# Patient Record
Sex: MALE | Race: ASIAN | NOT HISPANIC OR LATINO | URBAN - METROPOLITAN AREA
[De-identification: names, ages, dates, MRNs, and addresses within clinical notes are randomized per-mention and may not be internally consistent; named-entity substitution may affect disease eponyms.]

---

## 2017-01-18 ENCOUNTER — INPATIENT (INPATIENT)
Facility: HOSPITAL | Age: 37
LOS: 7 days | Discharge: HOME HEALTH | End: 2017-01-26
Attending: OTOLARYNGOLOGY | Admitting: OTOLARYNGOLOGY
Payer: COMMERCIAL

## 2017-01-18 VITALS
TEMPERATURE: 98 F | OXYGEN SATURATION: 100 % | SYSTOLIC BLOOD PRESSURE: 142 MMHG | DIASTOLIC BLOOD PRESSURE: 98 MMHG | HEART RATE: 110 BPM | RESPIRATION RATE: 16 BRPM

## 2017-01-18 LAB
ALBUMIN SERPL ELPH-MCNC: 4.6 G/DL — SIGNIFICANT CHANGE UP (ref 3.3–5)
ALP SERPL-CCNC: 50 U/L — SIGNIFICANT CHANGE UP (ref 40–120)
ALT FLD-CCNC: 19 U/L — SIGNIFICANT CHANGE UP (ref 4–41)
AST SERPL-CCNC: 12 U/L — SIGNIFICANT CHANGE UP (ref 4–40)
BASOPHILS # BLD AUTO: 0.03 K/UL — SIGNIFICANT CHANGE UP (ref 0–0.2)
BASOPHILS NFR BLD AUTO: 0.3 % — SIGNIFICANT CHANGE UP (ref 0–2)
BILIRUB SERPL-MCNC: 1.1 MG/DL — SIGNIFICANT CHANGE UP (ref 0.2–1.2)
BUN SERPL-MCNC: 12 MG/DL — SIGNIFICANT CHANGE UP (ref 7–23)
CALCIUM SERPL-MCNC: 9.9 MG/DL — SIGNIFICANT CHANGE UP (ref 8.4–10.5)
CHLORIDE SERPL-SCNC: 93 MMOL/L — LOW (ref 98–107)
CO2 SERPL-SCNC: 22 MMOL/L — SIGNIFICANT CHANGE UP (ref 22–31)
CREAT SERPL-MCNC: 0.91 MG/DL — SIGNIFICANT CHANGE UP (ref 0.5–1.3)
EOSINOPHIL # BLD AUTO: 0.06 K/UL — SIGNIFICANT CHANGE UP (ref 0–0.5)
EOSINOPHIL NFR BLD AUTO: 0.5 % — SIGNIFICANT CHANGE UP (ref 0–6)
GLUCOSE SERPL-MCNC: 282 MG/DL — HIGH (ref 70–99)
HBA1C BLD-MCNC: 10.9 % — HIGH (ref 4–5.6)
HCT VFR BLD CALC: 44.9 % — SIGNIFICANT CHANGE UP (ref 39–50)
HGB BLD-MCNC: 15.1 G/DL — SIGNIFICANT CHANGE UP (ref 13–17)
IMM GRANULOCYTES NFR BLD AUTO: 0.3 % — SIGNIFICANT CHANGE UP (ref 0–1.5)
LYMPHOCYTES # BLD AUTO: 1.21 K/UL — SIGNIFICANT CHANGE UP (ref 1–3.3)
LYMPHOCYTES # BLD AUTO: 10.1 % — LOW (ref 13–44)
MCHC RBC-ENTMCNC: 25 PG — LOW (ref 27–34)
MCHC RBC-ENTMCNC: 33.6 % — SIGNIFICANT CHANGE UP (ref 32–36)
MCV RBC AUTO: 74.5 FL — LOW (ref 80–100)
MONOCYTES # BLD AUTO: 1.08 K/UL — HIGH (ref 0–0.9)
MONOCYTES NFR BLD AUTO: 9 % — SIGNIFICANT CHANGE UP (ref 2–14)
NEUTROPHILS # BLD AUTO: 9.53 K/UL — HIGH (ref 1.8–7.4)
NEUTROPHILS NFR BLD AUTO: 79.8 % — HIGH (ref 43–77)
PLATELET # BLD AUTO: 206 K/UL — SIGNIFICANT CHANGE UP (ref 150–400)
PMV BLD: 10.3 FL — SIGNIFICANT CHANGE UP (ref 7–13)
POTASSIUM SERPL-MCNC: 4.2 MMOL/L — SIGNIFICANT CHANGE UP (ref 3.5–5.3)
POTASSIUM SERPL-SCNC: 4.2 MMOL/L — SIGNIFICANT CHANGE UP (ref 3.5–5.3)
PROT SERPL-MCNC: 8.5 G/DL — HIGH (ref 6–8.3)
RBC # BLD: 6.03 M/UL — HIGH (ref 4.2–5.8)
RBC # FLD: 13.3 % — SIGNIFICANT CHANGE UP (ref 10.3–14.5)
SODIUM SERPL-SCNC: 135 MMOL/L — SIGNIFICANT CHANGE UP (ref 135–145)
WBC # BLD: 11.95 K/UL — HIGH (ref 3.8–10.5)
WBC # FLD AUTO: 11.95 K/UL — HIGH (ref 3.8–10.5)

## 2017-01-18 PROCEDURE — 70491 CT SOFT TISSUE NECK W/DYE: CPT | Mod: 26

## 2017-01-18 RX ORDER — INSULIN LISPRO 100/ML
VIAL (ML) SUBCUTANEOUS AT BEDTIME
Qty: 0 | Refills: 0 | Status: DISCONTINUED | OUTPATIENT
Start: 2017-01-18 | End: 2017-01-26

## 2017-01-18 RX ORDER — DEXTROSE 50 % IN WATER 50 %
25 SYRINGE (ML) INTRAVENOUS ONCE
Qty: 0 | Refills: 0 | Status: DISCONTINUED | OUTPATIENT
Start: 2017-01-18 | End: 2017-01-26

## 2017-01-18 RX ORDER — DEXTROSE 50 % IN WATER 50 %
12.5 SYRINGE (ML) INTRAVENOUS ONCE
Qty: 0 | Refills: 0 | Status: DISCONTINUED | OUTPATIENT
Start: 2017-01-18 | End: 2017-01-26

## 2017-01-18 RX ORDER — SODIUM CHLORIDE 9 MG/ML
1000 INJECTION INTRAMUSCULAR; INTRAVENOUS; SUBCUTANEOUS ONCE
Qty: 0 | Refills: 0 | Status: COMPLETED | OUTPATIENT
Start: 2017-01-18 | End: 2017-01-18

## 2017-01-18 RX ORDER — KETOROLAC TROMETHAMINE 30 MG/ML
30 SYRINGE (ML) INJECTION EVERY 6 HOURS
Qty: 0 | Refills: 0 | Status: DISCONTINUED | OUTPATIENT
Start: 2017-01-18 | End: 2017-01-19

## 2017-01-18 RX ORDER — KETOROLAC TROMETHAMINE 30 MG/ML
30 SYRINGE (ML) INJECTION ONCE
Qty: 0 | Refills: 0 | Status: DISCONTINUED | OUTPATIENT
Start: 2017-01-18 | End: 2017-01-18

## 2017-01-18 RX ORDER — INSULIN GLARGINE 100 [IU]/ML
22 INJECTION, SOLUTION SUBCUTANEOUS AT BEDTIME
Qty: 0 | Refills: 0 | Status: DISCONTINUED | OUTPATIENT
Start: 2017-01-18 | End: 2017-01-19

## 2017-01-18 RX ORDER — SODIUM CHLORIDE 9 MG/ML
1000 INJECTION, SOLUTION INTRAVENOUS
Qty: 0 | Refills: 0 | Status: DISCONTINUED | OUTPATIENT
Start: 2017-01-18 | End: 2017-01-26

## 2017-01-18 RX ORDER — GLUCAGON INJECTION, SOLUTION 0.5 MG/.1ML
1 INJECTION, SOLUTION SUBCUTANEOUS ONCE
Qty: 0 | Refills: 0 | Status: DISCONTINUED | OUTPATIENT
Start: 2017-01-18 | End: 2017-01-26

## 2017-01-18 RX ORDER — DEXTROSE 50 % IN WATER 50 %
1 SYRINGE (ML) INTRAVENOUS ONCE
Qty: 0 | Refills: 0 | Status: DISCONTINUED | OUTPATIENT
Start: 2017-01-18 | End: 2017-01-26

## 2017-01-18 RX ORDER — INSULIN LISPRO 100/ML
VIAL (ML) SUBCUTANEOUS
Qty: 0 | Refills: 0 | Status: DISCONTINUED | OUTPATIENT
Start: 2017-01-18 | End: 2017-01-26

## 2017-01-18 RX ORDER — INSULIN LISPRO 100/ML
8 VIAL (ML) SUBCUTANEOUS
Qty: 0 | Refills: 0 | Status: DISCONTINUED | OUTPATIENT
Start: 2017-01-18 | End: 2017-01-19

## 2017-01-18 RX ADMIN — INSULIN GLARGINE 22 UNIT(S): 100 INJECTION, SOLUTION SUBCUTANEOUS at 23:20

## 2017-01-18 RX ADMIN — Medication 30 MILLIGRAM(S): at 18:59

## 2017-01-18 RX ADMIN — Medication 100 MILLIGRAM(S): at 18:02

## 2017-01-18 RX ADMIN — Medication 2: at 18:01

## 2017-01-18 RX ADMIN — Medication 30 MILLIGRAM(S): at 10:51

## 2017-01-18 RX ADMIN — Medication 30 MILLIGRAM(S): at 18:02

## 2017-01-18 RX ADMIN — Medication 30 MILLIGRAM(S): at 23:27

## 2017-01-18 RX ADMIN — Medication 30 MILLIGRAM(S): at 23:12

## 2017-01-18 RX ADMIN — Medication 100 MILLIGRAM(S): at 10:51

## 2017-01-18 RX ADMIN — SODIUM CHLORIDE 1000 MILLILITER(S): 9 INJECTION INTRAMUSCULAR; INTRAVENOUS; SUBCUTANEOUS at 10:51

## 2017-01-18 RX ADMIN — Medication 8 UNIT(S): at 18:02

## 2017-01-18 NOTE — ED PROVIDER NOTE - PROGRESS NOTE DETAILS
ED Attending Dr. Sauceda: pt feeling well, in NAD; cellulitis but no collection on CT; plan to place in CDU for IV abx and re-eval

## 2017-01-18 NOTE — ED ADULT TRIAGE NOTE - CHIEF COMPLAINT QUOTE
sent by PMD for (+) fever, right facial swelling with redness since Saturday. Initially started as a pimple and worsened. PMH: DM

## 2017-01-18 NOTE — ED PROVIDER NOTE - OBJECTIVE STATEMENT
35 yo male with T2DM on metformin, in ED with 4 days of swelling to right side of face with extension into right neck, associated with chills for 2 days.  No CP/SOB, N/V/D or abdominal pain.  Sent to ED for further evaluation. 37 yo male with T2DM on metformin, in ED with 4 days of swelling to right side of face with extension into right neck, associated with chills for 2 days.  Began as a comedone to face.  No CP/SOB, N/V/D or abdominal pain.  Sent to ED for further evaluation.

## 2017-01-18 NOTE — ED PROVIDER NOTE - MEDICAL DECISION MAKING DETAILS
facial swelling and chills in pt with diabetes; concern for facial infection extending possibly into neck; PLAN--pain control, labs, CT, IV abx, IV fluids, re-eval; likely CDU vs. admission for abx and monitoring

## 2017-01-18 NOTE — ED CDU PROVIDER NOTE - ATTENDING CONTRIBUTION TO CARE
CDU Attending Dr. Sauceda: 35 yo male with DM in ED with facial swelling x 4 days with 2 days of chills.  Found to have facial cellulitis and no abscess on CT.  Placed in CDU for IV abx and further monitoring.  On exam pt well appearing, in NAD, heart RRR, lungs CTAB, abd NTND, extremities without swelling, strength 5/5 in all extremities and skin without rash.  On face there is moderate swelling and induration with small amount of associated fluctuance to right cheek medially, extending to lateral aspect of lips and inferior toward submandibular area; mouth with swelling to right cheek structures making full mouth opening difficult; no obvious other oral pathology noted.

## 2017-01-18 NOTE — ED CDU PROVIDER NOTE - OBJECTIVE STATEMENT
37 yo male with T2DM on metformin, in ED with 4 days of swelling to right side of face with extension into right neck, associated with chills for 2 days.  Began as a comedone to face.  No CP/SOB, N/V/D or abdominal pain.  Sent to ED for further evaluation.    CDU Attending Dr. Sauceda: I agree with the above HPI.  The patient is a 37 yo diabetic male with right facial cellulitis x 4 days with 2 days of chills.  CT showing no abscess.  Sent to CDU for IV abx and re-evaluation and monitoring.

## 2017-01-18 NOTE — ED PROVIDER NOTE - PHYSICAL EXAMINATION
face: moderate swelling and induration with small amount of associated fluctuance to right cheek medially, extending to lateral aspect of lips and inferior toward submandibular area; mouth with swelling to right cheek structures making full mouth opening difficult; no obvious other oral pathology noted

## 2017-01-18 NOTE — ED ADULT NURSE NOTE - OBJECTIVE STATEMENT
Pt AOx3 c/o increasing right facial painful swelling the past few days along with chills. Right jaw swollen and red, tender to touch. Pt denies dysphagia or SOB.

## 2017-01-18 NOTE — ED CDU PROVIDER NOTE - PROGRESS NOTE DETAILS
CDU MATHIEU Ritchie Addendum------  This patient was signed out to me by CDU MATHIEU Barron and CDU attending Dr. Sauceda on 01/18/17 at 1900 hrs; test results reviewed.  In interim, pt has been resting comfortably; no complaints in interim.  Pt stable at present; will continue to monitor / reassess. CDU PA Ritchie Addendum----  Pt. resting comfortably in interim; no issues to date apart from intermittent pain (controlled with Toradol, being given every 6 hours) and percocet prn.  No objective change in facial swelling / erythema; am labs ordered; will continue to monitor / reassess.  Pt. will be signed out to CDU day PA and attending at 0700 hrs. CDU MATHIEU Bingham  Pt evaluated by ENT PA. No drainable abscess at this time. Discussed with Dr. De Los Santos who would like patient admitted to his service for monitoring and IV abx. Dr. De Los Santos requested antibiotics be changed to vancomycin. Erica progress and admit note:  Pt with significant right facial swelling, possible fluctuence, not improving- will call ENT, continue antibiotics, admit.  Lungs clear and OP patent.  Otherwise in nad. Erica progress and admit note and shared PA assessment:  I performed a face to face evaluation of this patient and obtained a history and performed a full exam.  I agree with the history, physical exam and plan of the PA.    Pt with significant right facial swelling, possible fluctuence, not improving- will call ENT, continue antibiotics, admit.  Lungs clear and OP patent.  Otherwise in nad.

## 2017-01-19 DIAGNOSIS — L03.211 CELLULITIS OF FACE: ICD-10-CM

## 2017-01-19 LAB
BASE EXCESS BLDV CALC-SCNC: 1 MMOL/L — SIGNIFICANT CHANGE UP
BASOPHILS # BLD AUTO: 0.02 K/UL — SIGNIFICANT CHANGE UP (ref 0–0.2)
BASOPHILS NFR BLD AUTO: 0.2 % — SIGNIFICANT CHANGE UP (ref 0–2)
BLOOD GAS VENOUS - CREATININE: 0.73 MG/DL — SIGNIFICANT CHANGE UP (ref 0.5–1.3)
BUN SERPL-MCNC: 14 MG/DL — SIGNIFICANT CHANGE UP (ref 7–23)
CALCIUM SERPL-MCNC: 9.4 MG/DL — SIGNIFICANT CHANGE UP (ref 8.4–10.5)
CHLORIDE BLDV-SCNC: 101 MMOL/L — SIGNIFICANT CHANGE UP (ref 96–108)
CHLORIDE SERPL-SCNC: 95 MMOL/L — LOW (ref 98–107)
CO2 SERPL-SCNC: 24 MMOL/L — SIGNIFICANT CHANGE UP (ref 22–31)
CREAT SERPL-MCNC: 0.79 MG/DL — SIGNIFICANT CHANGE UP (ref 0.5–1.3)
EOSINOPHIL # BLD AUTO: 0.15 K/UL — SIGNIFICANT CHANGE UP (ref 0–0.5)
EOSINOPHIL NFR BLD AUTO: 1.7 % — SIGNIFICANT CHANGE UP (ref 0–6)
GAS PNL BLDV: 131 MMOL/L — LOW (ref 136–146)
GLUCOSE BLDV-MCNC: 221 — HIGH (ref 70–99)
GLUCOSE SERPL-MCNC: 223 MG/DL — HIGH (ref 70–99)
GRAM STN SPEC: SIGNIFICANT CHANGE UP
HCO3 BLDV-SCNC: 24 MMOL/L — SIGNIFICANT CHANGE UP (ref 20–27)
HCT VFR BLD CALC: 41.6 % — SIGNIFICANT CHANGE UP (ref 39–50)
HCT VFR BLDV CALC: 44.6 % — SIGNIFICANT CHANGE UP (ref 39–51)
HGB BLD-MCNC: 13.9 G/DL — SIGNIFICANT CHANGE UP (ref 13–17)
HGB BLDV-MCNC: 14.6 G/DL — SIGNIFICANT CHANGE UP (ref 13–17)
IMM GRANULOCYTES NFR BLD AUTO: 0.2 % — SIGNIFICANT CHANGE UP (ref 0–1.5)
LACTATE BLDV-MCNC: 1.6 MMOL/L — SIGNIFICANT CHANGE UP (ref 0.5–2)
LYMPHOCYTES # BLD AUTO: 0.81 K/UL — LOW (ref 1–3.3)
LYMPHOCYTES # BLD AUTO: 9.3 % — LOW (ref 13–44)
MCHC RBC-ENTMCNC: 24.7 PG — LOW (ref 27–34)
MCHC RBC-ENTMCNC: 33.4 % — SIGNIFICANT CHANGE UP (ref 32–36)
MCV RBC AUTO: 74 FL — LOW (ref 80–100)
MONOCYTES # BLD AUTO: 0.68 K/UL — SIGNIFICANT CHANGE UP (ref 0–0.9)
MONOCYTES NFR BLD AUTO: 7.8 % — SIGNIFICANT CHANGE UP (ref 2–14)
NEUTROPHILS # BLD AUTO: 6.99 K/UL — SIGNIFICANT CHANGE UP (ref 1.8–7.4)
NEUTROPHILS NFR BLD AUTO: 80.8 % — HIGH (ref 43–77)
PCO2 BLDV: 49 MMHG — SIGNIFICANT CHANGE UP (ref 41–51)
PH BLDV: 7.35 PH — SIGNIFICANT CHANGE UP (ref 7.32–7.43)
PLATELET # BLD AUTO: 182 K/UL — SIGNIFICANT CHANGE UP (ref 150–400)
PMV BLD: 9.9 FL — SIGNIFICANT CHANGE UP (ref 7–13)
PO2 BLDV: 33 MMHG — LOW (ref 35–40)
POTASSIUM BLDV-SCNC: 3.7 MMOL/L — SIGNIFICANT CHANGE UP (ref 3.4–4.5)
POTASSIUM SERPL-MCNC: 4 MMOL/L — SIGNIFICANT CHANGE UP (ref 3.5–5.3)
POTASSIUM SERPL-SCNC: 4 MMOL/L — SIGNIFICANT CHANGE UP (ref 3.5–5.3)
RBC # BLD: 5.62 M/UL — SIGNIFICANT CHANGE UP (ref 4.2–5.8)
RBC # FLD: 13.2 % — SIGNIFICANT CHANGE UP (ref 10.3–14.5)
SAO2 % BLDV: 58.8 % — LOW (ref 60–85)
SODIUM SERPL-SCNC: 133 MMOL/L — LOW (ref 135–145)
SPECIMEN SOURCE: SIGNIFICANT CHANGE UP
WBC # BLD: 8.67 K/UL — SIGNIFICANT CHANGE UP (ref 3.8–10.5)
WBC # FLD AUTO: 8.67 K/UL — SIGNIFICANT CHANGE UP (ref 3.8–10.5)

## 2017-01-19 PROCEDURE — 99255 IP/OBS CONSLTJ NEW/EST HI 80: CPT | Mod: GC

## 2017-01-19 RX ORDER — INSULIN GLARGINE 100 [IU]/ML
25 INJECTION, SOLUTION SUBCUTANEOUS AT BEDTIME
Qty: 0 | Refills: 0 | Status: DISCONTINUED | OUTPATIENT
Start: 2017-01-19 | End: 2017-01-26

## 2017-01-19 RX ORDER — HYDROMORPHONE HYDROCHLORIDE 2 MG/ML
2 INJECTION INTRAMUSCULAR; INTRAVENOUS; SUBCUTANEOUS ONCE
Qty: 0 | Refills: 0 | Status: DISCONTINUED | OUTPATIENT
Start: 2017-01-19 | End: 2017-01-20

## 2017-01-19 RX ORDER — INSULIN LISPRO 100/ML
12 VIAL (ML) SUBCUTANEOUS
Qty: 0 | Refills: 0 | Status: DISCONTINUED | OUTPATIENT
Start: 2017-01-19 | End: 2017-01-19

## 2017-01-19 RX ORDER — INSULIN LISPRO 100/ML
10 VIAL (ML) SUBCUTANEOUS
Qty: 0 | Refills: 0 | Status: DISCONTINUED | OUTPATIENT
Start: 2017-01-19 | End: 2017-01-25

## 2017-01-19 RX ORDER — MORPHINE SULFATE 50 MG/1
4 CAPSULE, EXTENDED RELEASE ORAL EVERY 4 HOURS
Qty: 0 | Refills: 0 | Status: DISCONTINUED | OUTPATIENT
Start: 2017-01-19 | End: 2017-01-20

## 2017-01-19 RX ORDER — ACETAMINOPHEN 500 MG
650 TABLET ORAL EVERY 6 HOURS
Qty: 0 | Refills: 0 | Status: DISCONTINUED | OUTPATIENT
Start: 2017-01-19 | End: 2017-01-26

## 2017-01-19 RX ADMIN — INSULIN GLARGINE 25 UNIT(S): 100 INJECTION, SOLUTION SUBCUTANEOUS at 22:30

## 2017-01-19 RX ADMIN — Medication 10 UNIT(S): at 19:34

## 2017-01-19 RX ADMIN — Medication 100 MILLIGRAM(S): at 01:35

## 2017-01-19 RX ADMIN — Medication 100 MILLIGRAM(S): at 10:36

## 2017-01-19 RX ADMIN — Medication 8 UNIT(S): at 09:13

## 2017-01-19 RX ADMIN — Medication 2: at 19:33

## 2017-01-19 RX ADMIN — MORPHINE SULFATE 4 MILLIGRAM(S): 50 CAPSULE, EXTENDED RELEASE ORAL at 22:35

## 2017-01-19 RX ADMIN — Medication 30 MILLIGRAM(S): at 13:10

## 2017-01-19 RX ADMIN — Medication: at 13:16

## 2017-01-19 RX ADMIN — MORPHINE SULFATE 4 MILLIGRAM(S): 50 CAPSULE, EXTENDED RELEASE ORAL at 22:20

## 2017-01-19 RX ADMIN — Medication 100 MILLIGRAM(S): at 19:34

## 2017-01-19 RX ADMIN — Medication 30 MILLIGRAM(S): at 06:42

## 2017-01-19 RX ADMIN — Medication 8 UNIT(S): at 13:10

## 2017-01-19 RX ADMIN — Medication 2: at 09:13

## 2017-01-19 RX ADMIN — Medication 30 MILLIGRAM(S): at 06:57

## 2017-01-19 NOTE — H&P ADULT. - ENMT COMMENTS
right sided lip and facial swelling, no fluctuance right sided lip and facial and oral cavity swelling, +fluctuance; nasal/sinus endocscopy: right maxillary sinus with seromucous fluid via inferior meatus, left max sinus clear

## 2017-01-19 NOTE — H&P ADULT. - PROBLEM SELECTOR PLAN 1
IV antibiotics Incision & Drainage of right cheek/oral cavity abscess  IV Vancomycin  Airway observation  Endocrine consult for blood sugar control

## 2017-01-19 NOTE — H&P ADULT. - ENT GEN HX ROS MEA POS PC
sore throat/throat pain/nasal congestion/nasal obstruction/dry mouth/dysphagia/post-nasal discharge/gum bleeding/nasal discharge

## 2017-01-19 NOTE — H&P ADULT. - HISTORY OF PRESENT ILLNESS
36 year old male with DM presents to the ED stating he has been having Right facial swelling and pain. Treated recently with antibiotics and steroids and the swelling improved. States swelling got worse yesterday. 36 year old male with a past medical history significant for DM presents to the emergency room at [facility] with a chief complaint of right face and oral cavity pain and swelling for the past several hours. The patient saw an outside MD who prescribed antibiotics but it did not improve. He has difficulty swallowing and changes in his voice. He has sinus pain and a postnasal drip. His pain and swelling is getting much worse.

## 2017-01-20 PROCEDURE — 93010 ELECTROCARDIOGRAM REPORT: CPT

## 2017-01-20 PROCEDURE — 99253 IP/OBS CNSLTJ NEW/EST LOW 45: CPT

## 2017-01-20 PROCEDURE — 99232 SBSQ HOSP IP/OBS MODERATE 35: CPT

## 2017-01-20 RX ORDER — METFORMIN HYDROCHLORIDE 850 MG/1
1000 TABLET ORAL
Qty: 0 | Refills: 0 | Status: DISCONTINUED | OUTPATIENT
Start: 2017-01-20 | End: 2017-01-26

## 2017-01-20 RX ORDER — BACITRACIN ZINC 500 UNIT/G
1 OINTMENT IN PACKET (EA) TOPICAL
Qty: 0 | Refills: 0 | Status: DISCONTINUED | OUTPATIENT
Start: 2017-01-20 | End: 2017-01-20

## 2017-01-20 RX ORDER — MUPIROCIN 20 MG/G
1 OINTMENT TOPICAL
Qty: 0 | Refills: 0 | Status: DISCONTINUED | OUTPATIENT
Start: 2017-01-20 | End: 2017-01-26

## 2017-01-20 RX ORDER — AMPICILLIN SODIUM AND SULBACTAM SODIUM 250; 125 MG/ML; MG/ML
3 INJECTION, POWDER, FOR SUSPENSION INTRAMUSCULAR; INTRAVENOUS EVERY 6 HOURS
Qty: 0 | Refills: 0 | Status: DISCONTINUED | OUTPATIENT
Start: 2017-01-20 | End: 2017-01-25

## 2017-01-20 RX ADMIN — Medication 10 UNIT(S): at 17:49

## 2017-01-20 RX ADMIN — Medication 1: at 17:49

## 2017-01-20 RX ADMIN — HYDROMORPHONE HYDROCHLORIDE 2 MILLIGRAM(S): 2 INJECTION INTRAMUSCULAR; INTRAVENOUS; SUBCUTANEOUS at 01:08

## 2017-01-20 RX ADMIN — Medication 100 MILLIGRAM(S): at 02:16

## 2017-01-20 RX ADMIN — INSULIN GLARGINE 25 UNIT(S): 100 INJECTION, SOLUTION SUBCUTANEOUS at 22:47

## 2017-01-20 RX ADMIN — Medication 10 UNIT(S): at 12:31

## 2017-01-20 RX ADMIN — AMPICILLIN SODIUM AND SULBACTAM SODIUM 200 GRAM(S): 250; 125 INJECTION, POWDER, FOR SUSPENSION INTRAMUSCULAR; INTRAVENOUS at 22:45

## 2017-01-20 RX ADMIN — METFORMIN HYDROCHLORIDE 1000 MILLIGRAM(S): 850 TABLET ORAL at 17:49

## 2017-01-20 RX ADMIN — Medication 10 UNIT(S): at 08:46

## 2017-01-20 RX ADMIN — Medication 1: at 12:31

## 2017-01-20 RX ADMIN — HYDROMORPHONE HYDROCHLORIDE 2 MILLIGRAM(S): 2 INJECTION INTRAMUSCULAR; INTRAVENOUS; SUBCUTANEOUS at 00:38

## 2017-01-20 RX ADMIN — Medication 100 MILLIGRAM(S): at 17:49

## 2017-01-20 RX ADMIN — Medication 100 MILLIGRAM(S): at 10:17

## 2017-01-20 RX ADMIN — Medication 1: at 08:46

## 2017-01-20 NOTE — DISCHARGE NOTE ADULT - HOSPITAL COURSE
36 year old Male with a history of DM that presented to the hospital with Right facial swelling and cellulitis, Small abscess drained at bedside with I and D. Started on IV clindamycin and wound was packed with 1/4 gauze... 36 year old Male that presented to the hospital with Right facial swelling and cellulitis, Small abscess drained at bedside with I and D. Started on IV clindamycin and wound was packed with 1/4 gauze. Patient was than switched to Unasyn and Evaluated by ID. Also Evaluated by Endocrinology after new diagnosis of DM. Started on glucophage and Subq insulin. Discharged home on augmentin on 1/26/17. Also Will have outpt Packing once daily with [facility].

## 2017-01-20 NOTE — DISCHARGE NOTE ADULT - INSTRUCTIONS
Keep incision site intact dry and clean, monitor fs as md orders, f/u md visit and instructions, any fever ,chills, nausea vomiting call md,

## 2017-01-20 NOTE — DISCHARGE NOTE ADULT - ADDITIONAL INSTRUCTIONS
Follow up with Jazzmine James NP at [facility] Endocrinology office at [address] [place] [address], 2/3/17 at 245pm.  [phone] Follow up with Jazzmine James NP at [facility] Endocrinology office at [address] [place] [address], 2/3/17 at 245pm.  [phone]  Packing changes to Right cheek w 1/4 gauze. Cover with dry gauze and tape in place. Change daily.

## 2017-01-20 NOTE — DISCHARGE NOTE ADULT - CARE PROVIDER_API CALL
Soren De Los Santos), Otolaryngology  [address] [place] [address]  Phone: [phone]  Fax: [phone] Soren De Los Santos), Otolaryngology  [address] [place] [address]  Phone: [phone]  Fax: [phone]    Berlin Galan), EndocrinologyMetabDiabetes; Internal Medicine  [address] [place] [address]  Phone: [phone]  Fax: [phone]

## 2017-01-20 NOTE — DISCHARGE NOTE ADULT - PATIENT PORTAL LINK FT
“You can access the Follow[employer] Patient Portal, offered by [facility], by registering with the following website: [email or web address]”

## 2017-01-20 NOTE — DISCHARGE NOTE ADULT - CARE PROVIDERS DIRECT ADDRESSES
,DirectAddress_Unknown,DirectAddress_Unknown ,DirectAddress_Unknown,[email or web address],DirectAddress_Unknown

## 2017-01-20 NOTE — DISCHARGE NOTE ADULT - PLAN OF CARE
IV antibiotics, I and D of facial abscess regular ADA diet target fingerstick  Please check you fingersticks every morning, or if you are not feeling well and before meals.  If your fingerstick is >300 x 3 or more readings Please contact Dr. Galan or Jazzmine James NP the diabetes team who cared for you in the hospital.  If your fingerstick is <70 and/or you have symptoms of very low blood sugar, FIRST drink 1/2 cup of apple juice (or take 4 glucose tabs) and recheck fingerstick in 15min. Repeat these steps until blood sugar is above 100, if necessary.  THEN call Dr. Galan or Jazzmine James NP to discuss low sugar at [phone]

## 2017-01-20 NOTE — DISCHARGE NOTE ADULT - MEDICATION SUMMARY - MEDICATIONS TO TAKE
I will START or STAY ON the medications listed below when I get home from the hospital:    glucometer  -- 1 application intradermal 3 times a day (before meals)  -- Indication: For DM    acetaminophen-oxyCODONE 325 mg-5 mg oral tablet  -- 2 tab(s) by mouth every 6 hours, As Needed, Moderate Pain (4 - 6) MDD:8  -- Indication: For pain    Lantus Solostar Pen 100 units/mL subcutaneous solution  -- 25 unit(s) subcutaneous prn  -- Do not drink alcoholic beverages when taking this medication.  It is very important that you take or use this exactly as directed.  Do not skip doses or discontinue unless directed by your doctor.  Keep in refrigerator.  Do not freeze.    -- Indication: For DM    metFORMIN 1000 mg oral tablet  -- 1 tab(s) by mouth 2 times a day (with meals)  -- Indication: For DM I will START or STAY ON the medications listed below when I get home from the hospital:    glucometer  -- 1 application intradermal 3 times a day (before meals)  -- Indication: For DM    test strips  -- 1 unit(s) subcutaneous 4 times a day  -- Indication: For DM    Lancets  -- 1 unit(s) subcutaneous 4 times a day  -- Indication: For DM    acetaminophen-oxyCODONE 325 mg-5 mg oral tablet  -- 2 tab(s) by mouth every 6 hours, As Needed, Moderate Pain (4 - 6) MDD:8  -- Indication: For pain    Lantus Solostar Pen 100 units/mL subcutaneous solution  -- 25 unit(s) subcutaneous prn  -- Do not drink alcoholic beverages when taking this medication.  It is very important that you take or use this exactly as directed.  Do not skip doses or discontinue unless directed by your doctor.  Keep in refrigerator.  Do not freeze.    -- Indication: For DM    metFORMIN 1000 mg oral tablet  -- 1 tab(s) by mouth 2 times a day (with meals)  -- Indication: For DM    insulin lispro 100 units/mL subcutaneous solution  -- 8 unit(s) subcutaneous 3 times a day (before meals)  -- Indication: For DM    amoxicillin-clavulanate 875 mg-125 mg oral tablet  -- 1 tab(s) by mouth 2 times a day  -- Indication: For abscess I will START or STAY ON the medications listed below when I get home from the hospital:    glucometer  -- 1 application intradermal 3 times a day (before meals)  -- Indication: For DM    test strips  -- 1 unit(s) subcutaneous 4 times a day  -- Indication: For DM    Lancets  -- 1 unit(s) subcutaneous 4 times a day  -- Indication: For DM    acetaminophen-oxyCODONE 325 mg-5 mg oral tablet  -- 2 tab(s) by mouth every 6 hours, As Needed, Moderate Pain (4 - 6) MDD:8  -- Indication: For pain    Lantus Solostar Pen 100 units/mL subcutaneous solution  -- 25 unit(s) subcutaneous prn  -- Do not drink alcoholic beverages when taking this medication.  It is very important that you take or use this exactly as directed.  Do not skip doses or discontinue unless directed by your doctor.  Keep in refrigerator.  Do not freeze.    -- Indication: For DM    metFORMIN 1000 mg oral tablet  -- 1 tab(s) by mouth 2 times a day (with meals)  -- Indication: For DM    insulin lispro 100 units/mL subcutaneous solution  -- 8 unit(s) subcutaneous 3 times a day (before meals). Please dipense the Pens. Our online prescritiion writer doesn't have them to choose   -- Indication: For DM    amoxicillin-clavulanate 875 mg-125 mg oral tablet  -- 1 tab(s) by mouth 2 times a day  -- Indication: For abscess

## 2017-01-20 NOTE — DISCHARGE NOTE ADULT - CARE PLAN
Principal Discharge DX:	Facial cellulitis  Goal:	IV antibiotics, I and D of facial abscess  Instructions for follow-up, activity and diet:	regular ADA diet Principal Discharge DX:	Facial cellulitis  Goal:	IV antibiotics, I and D of facial abscess  Instructions for follow-up, activity and diet:	regular ADA diet  Secondary Diagnosis:	Type 2 diabetes mellitus with complication, without long-term current use of insulin  Goal:	target fingerstick   Instructions for follow-up, activity and diet:	Please check you fingersticks every morning, or if you are not feeling well and before meals.  If your fingerstick is >300 x 3 or more readings Please contact Dr. Galan or Jazzmine James NP the diabetes team who cared for you in the hospital.  If your fingerstick is <70 and/or you have symptoms of very low blood sugar, FIRST drink 1/2 cup of apple juice (or take 4 glucose tabs) and recheck fingerstick in 15min. Repeat these steps until blood sugar is above 100, if necessary.  THEN call Dr. Galan or Jazzmine James NP to discuss low sugar at [phone] Principal Discharge DX:	Facial cellulitis  Goal:	IV antibiotics, I and D of facial abscess  Instructions for follow-up, activity and diet:	regular ADA diet  Secondary Diagnosis:	Type 2 diabetes mellitus with complication, without long-term current use of insulin  Goal:	target fingerstick   Instructions for follow-up, activity and diet:	Please check you fingersticks every morning, or if you are not feeling well and before meals.  If your fingerstick is >300 x 3 or more readings Please contact Dr. Galan or Jazzmine James NP the diabetes team who cared for you in the hospital.  If your fingerstick is <70 and/or you have symptoms of very low blood sugar, FIRST drink 1/2 cup of apple juice (or take 4 glucose tabs) and recheck fingerstick in 15min. Repeat these steps until blood sugar is above 100, if necessary.  THEN call Dr. Galan or Jazzmine James NP to discuss low sugar at [phone] Principal Discharge DX:	Facial cellulitis  Goal:	IV antibiotics, I and D of facial abscess  Instructions for follow-up, activity and diet:	regular ADA diet  Secondary Diagnosis:	Type 2 diabetes mellitus with complication, without long-term current use of insulin  Goal:	target fingerstick   Instructions for follow-up, activity and diet:	Please check you fingersticks every morning, or if you are not feeling well and before meals.  If your fingerstick is >300 x 3 or more readings Please contact Dr. Galan or Jazzmine James NP the diabetes team who cared for you in the hospital.  If your fingerstick is <70 and/or you have symptoms of very low blood sugar, FIRST drink 1/2 cup of apple juice (or take 4 glucose tabs) and recheck fingerstick in 15min. Repeat these steps until blood sugar is above 100, if necessary.  THEN call Dr. Galan or Jazzmine James NP to discuss low sugar at [phone] Principal Discharge DX:	Facial cellulitis  Goal:	IV antibiotics, I and D of facial abscess  Instructions for follow-up, activity and diet:	regular ADA diet  Secondary Diagnosis:	Type 2 diabetes mellitus with complication, without long-term current use of insulin  Goal:	target fingerstick   Instructions for follow-up, activity and diet:	Please check you fingersticks every morning, or if you are not feeling well and before meals.  If your fingerstick is >300 x 3 or more readings Please contact Dr. Galan or Jazzmine James NP the diabetes team who cared for you in the hospital.  If your fingerstick is <70 and/or you have symptoms of very low blood sugar, FIRST drink 1/2 cup of apple juice (or take 4 glucose tabs) and recheck fingerstick in 15min. Repeat these steps until blood sugar is above 100, if necessary.  THEN call Dr. Galan or Jazzmine James NP to discuss low sugar at [phone] Principal Discharge DX:	Facial cellulitis  Goal:	IV antibiotics, I and D of facial abscess  Instructions for follow-up, activity and diet:	regular ADA diet  Secondary Diagnosis:	Type 2 diabetes mellitus with complication, without long-term current use of insulin  Goal:	target fingerstick   Instructions for follow-up, activity and diet:	Please check you fingersticks every morning, or if you are not feeling well and before meals.  If your fingerstick is >300 x 3 or more readings Please contact Dr. Galan or Jazzmine James NP the diabetes team who cared for you in the hospital.  If your fingerstick is <70 and/or you have symptoms of very low blood sugar, FIRST drink 1/2 cup of apple juice (or take 4 glucose tabs) and recheck fingerstick in 15min. Repeat these steps until blood sugar is above 100, if necessary.  THEN call Dr. Galan or Jazzmine James NP to discuss low sugar at [phone] Principal Discharge DX:	Facial cellulitis  Goal:	IV antibiotics, I and D of facial abscess  Instructions for follow-up, activity and diet:	regular ADA diet  Secondary Diagnosis:	Type 2 diabetes mellitus with complication, without long-term current use of insulin  Goal:	target fingerstick   Instructions for follow-up, activity and diet:	Please check you fingersticks every morning, or if you are not feeling well and before meals.  If your fingerstick is >300 x 3 or more readings Please contact Dr. Galan or Jazzmine James NP the diabetes team who cared for you in the hospital.  If your fingerstick is <70 and/or you have symptoms of very low blood sugar, FIRST drink 1/2 cup of apple juice (or take 4 glucose tabs) and recheck fingerstick in 15min. Repeat these steps until blood sugar is above 100, if necessary.  THEN call Dr. Galan or Jazzmine James NP to discuss low sugar at [phone] Principal Discharge DX:	Facial cellulitis  Goal:	IV antibiotics, I and D of facial abscess  Instructions for follow-up, activity and diet:	regular ADA diet  Secondary Diagnosis:	Type 2 diabetes mellitus with complication, without long-term current use of insulin  Goal:	target fingerstick   Instructions for follow-up, activity and diet:	Please check you fingersticks every morning, or if you are not feeling well and before meals.  If your fingerstick is >300 x 3 or more readings Please contact Dr. Galan or Jazzmine James NP the diabetes team who cared for you in the hospital.  If your fingerstick is <70 and/or you have symptoms of very low blood sugar, FIRST drink 1/2 cup of apple juice (or take 4 glucose tabs) and recheck fingerstick in 15min. Repeat these steps until blood sugar is above 100, if necessary.  THEN call Dr. Galan or Jazzmine James NP to discuss low sugar at [phone]

## 2017-01-21 LAB
-  AMIKACIN: SIGNIFICANT CHANGE UP
-  AMPICILLIN/SULBACTAM: SIGNIFICANT CHANGE UP
-  AMPICILLIN: SIGNIFICANT CHANGE UP
-  AZTREONAM: SIGNIFICANT CHANGE UP
-  CEFAZOLIN: SIGNIFICANT CHANGE UP
-  CEFEPIME: SIGNIFICANT CHANGE UP
-  CEFOXITIN: SIGNIFICANT CHANGE UP
-  CEFTAZIDIME: SIGNIFICANT CHANGE UP
-  CEFTRIAXONE: SIGNIFICANT CHANGE UP
-  CIPROFLOXACIN: SIGNIFICANT CHANGE UP
-  ERTAPENEM: SIGNIFICANT CHANGE UP
-  GENTAMICIN: SIGNIFICANT CHANGE UP
-  IMIPENEM: SIGNIFICANT CHANGE UP
-  LEVOFLOXACIN: SIGNIFICANT CHANGE UP
-  MEROPENEM: SIGNIFICANT CHANGE UP
-  PIPERACILLIN/TAZOBACTAM: SIGNIFICANT CHANGE UP
-  TIGECYCLINE: SIGNIFICANT CHANGE UP
-  TOBRAMYCIN: SIGNIFICANT CHANGE UP
-  TRIMETHOPRIM/SULFAMETHOXAZOLE: SIGNIFICANT CHANGE UP
BUN SERPL-MCNC: 11 MG/DL — SIGNIFICANT CHANGE UP (ref 7–23)
CALCIUM SERPL-MCNC: 9.2 MG/DL — SIGNIFICANT CHANGE UP (ref 8.4–10.5)
CHLORIDE SERPL-SCNC: 98 MMOL/L — SIGNIFICANT CHANGE UP (ref 98–107)
CO2 SERPL-SCNC: 27 MMOL/L — SIGNIFICANT CHANGE UP (ref 22–31)
CREAT SERPL-MCNC: 0.85 MG/DL — SIGNIFICANT CHANGE UP (ref 0.5–1.3)
CULTURE RESULTS: SIGNIFICANT CHANGE UP
GLUCOSE SERPL-MCNC: 162 MG/DL — HIGH (ref 70–99)
GRAM STN SPEC: SIGNIFICANT CHANGE UP
HCT VFR BLD CALC: 38.4 % — LOW (ref 39–50)
HGB BLD-MCNC: 12.8 G/DL — LOW (ref 13–17)
MAGNESIUM SERPL-MCNC: 2 MG/DL — SIGNIFICANT CHANGE UP (ref 1.6–2.6)
MCHC RBC-ENTMCNC: 24.5 PG — LOW (ref 27–34)
MCHC RBC-ENTMCNC: 33.3 % — SIGNIFICANT CHANGE UP (ref 32–36)
MCV RBC AUTO: 73.6 FL — LOW (ref 80–100)
METHOD TYPE: SIGNIFICANT CHANGE UP
ORGANISM # SPEC MICROSCOPIC CNT: SIGNIFICANT CHANGE UP
PHOSPHATE SERPL-MCNC: 3.7 MG/DL — SIGNIFICANT CHANGE UP (ref 2.5–4.5)
PLATELET # BLD AUTO: 202 K/UL — SIGNIFICANT CHANGE UP (ref 150–400)
PMV BLD: 9.7 FL — SIGNIFICANT CHANGE UP (ref 7–13)
POTASSIUM SERPL-MCNC: 4 MMOL/L — SIGNIFICANT CHANGE UP (ref 3.5–5.3)
POTASSIUM SERPL-SCNC: 4 MMOL/L — SIGNIFICANT CHANGE UP (ref 3.5–5.3)
RBC # BLD: 5.22 M/UL — SIGNIFICANT CHANGE UP (ref 4.2–5.8)
RBC # FLD: 13 % — SIGNIFICANT CHANGE UP (ref 10.3–14.5)
SODIUM SERPL-SCNC: 137 MMOL/L — SIGNIFICANT CHANGE UP (ref 135–145)
SPECIMEN SOURCE: SIGNIFICANT CHANGE UP
WBC # BLD: 6.66 K/UL — SIGNIFICANT CHANGE UP (ref 3.8–10.5)
WBC # FLD AUTO: 6.66 K/UL — SIGNIFICANT CHANGE UP (ref 3.8–10.5)

## 2017-01-21 PROCEDURE — 99232 SBSQ HOSP IP/OBS MODERATE 35: CPT

## 2017-01-21 RX ORDER — MORPHINE SULFATE 50 MG/1
4 CAPSULE, EXTENDED RELEASE ORAL ONCE
Qty: 0 | Refills: 0 | Status: DISCONTINUED | OUTPATIENT
Start: 2017-01-21 | End: 2017-01-22

## 2017-01-21 RX ADMIN — AMPICILLIN SODIUM AND SULBACTAM SODIUM 200 GRAM(S): 250; 125 INJECTION, POWDER, FOR SUSPENSION INTRAMUSCULAR; INTRAVENOUS at 03:01

## 2017-01-21 RX ADMIN — Medication 1: at 12:57

## 2017-01-21 RX ADMIN — AMPICILLIN SODIUM AND SULBACTAM SODIUM 200 GRAM(S): 250; 125 INJECTION, POWDER, FOR SUSPENSION INTRAMUSCULAR; INTRAVENOUS at 22:00

## 2017-01-21 RX ADMIN — METFORMIN HYDROCHLORIDE 1000 MILLIGRAM(S): 850 TABLET ORAL at 08:59

## 2017-01-21 RX ADMIN — Medication 10 UNIT(S): at 17:34

## 2017-01-21 RX ADMIN — AMPICILLIN SODIUM AND SULBACTAM SODIUM 200 GRAM(S): 250; 125 INJECTION, POWDER, FOR SUSPENSION INTRAMUSCULAR; INTRAVENOUS at 09:00

## 2017-01-21 RX ADMIN — Medication 1: at 08:59

## 2017-01-21 RX ADMIN — INSULIN GLARGINE 25 UNIT(S): 100 INJECTION, SOLUTION SUBCUTANEOUS at 22:25

## 2017-01-21 RX ADMIN — Medication 1: at 17:35

## 2017-01-21 RX ADMIN — Medication 10 UNIT(S): at 08:59

## 2017-01-21 RX ADMIN — AMPICILLIN SODIUM AND SULBACTAM SODIUM 200 GRAM(S): 250; 125 INJECTION, POWDER, FOR SUSPENSION INTRAMUSCULAR; INTRAVENOUS at 15:33

## 2017-01-21 RX ADMIN — Medication 10 UNIT(S): at 12:56

## 2017-01-21 RX ADMIN — METFORMIN HYDROCHLORIDE 1000 MILLIGRAM(S): 850 TABLET ORAL at 17:34

## 2017-01-21 RX ADMIN — MUPIROCIN 1 APPLICATION(S): 20 OINTMENT TOPICAL at 07:15

## 2017-01-22 RX ADMIN — AMPICILLIN SODIUM AND SULBACTAM SODIUM 200 GRAM(S): 250; 125 INJECTION, POWDER, FOR SUSPENSION INTRAMUSCULAR; INTRAVENOUS at 15:14

## 2017-01-22 RX ADMIN — MUPIROCIN 1 APPLICATION(S): 20 OINTMENT TOPICAL at 17:32

## 2017-01-22 RX ADMIN — MORPHINE SULFATE 4 MILLIGRAM(S): 50 CAPSULE, EXTENDED RELEASE ORAL at 07:53

## 2017-01-22 RX ADMIN — Medication 10 UNIT(S): at 17:32

## 2017-01-22 RX ADMIN — METFORMIN HYDROCHLORIDE 1000 MILLIGRAM(S): 850 TABLET ORAL at 17:32

## 2017-01-22 RX ADMIN — AMPICILLIN SODIUM AND SULBACTAM SODIUM 200 GRAM(S): 250; 125 INJECTION, POWDER, FOR SUSPENSION INTRAMUSCULAR; INTRAVENOUS at 09:08

## 2017-01-22 RX ADMIN — AMPICILLIN SODIUM AND SULBACTAM SODIUM 200 GRAM(S): 250; 125 INJECTION, POWDER, FOR SUSPENSION INTRAMUSCULAR; INTRAVENOUS at 21:53

## 2017-01-22 RX ADMIN — MORPHINE SULFATE 4 MILLIGRAM(S): 50 CAPSULE, EXTENDED RELEASE ORAL at 08:00

## 2017-01-22 RX ADMIN — AMPICILLIN SODIUM AND SULBACTAM SODIUM 200 GRAM(S): 250; 125 INJECTION, POWDER, FOR SUSPENSION INTRAMUSCULAR; INTRAVENOUS at 02:19

## 2017-01-22 RX ADMIN — METFORMIN HYDROCHLORIDE 1000 MILLIGRAM(S): 850 TABLET ORAL at 10:31

## 2017-01-22 RX ADMIN — Medication 1: at 12:53

## 2017-01-22 RX ADMIN — Medication 10 UNIT(S): at 12:53

## 2017-01-22 RX ADMIN — Medication 10 UNIT(S): at 10:22

## 2017-01-22 RX ADMIN — Medication 1: at 10:22

## 2017-01-22 RX ADMIN — INSULIN GLARGINE 25 UNIT(S): 100 INJECTION, SOLUTION SUBCUTANEOUS at 23:16

## 2017-01-22 NOTE — DIETITIAN INITIAL EVALUATION ADULT. - OTHER INFO
Pt was admitted with face cellulitis, s/p I&D right cheek/jaw. Received nutrition consult for uncontrolled Diabetes. Pt reports he is completing 75% meals at present, able to chew on left side. Pt refused Softer foods or liquids diet. Offered Supplement as well, which was also declined.  Denies any nausea, vomiting, diarrhea, constipation. Pt with variable glucose levels at home. Am levels 175-200 gm/dl. Pt not following any particular diet. Reviewed consistent carbohydrate dieta and related principles.

## 2017-01-22 NOTE — DIETITIAN INITIAL EVALUATION ADULT. - PROBLEM SELECTOR PLAN 1
Incision & Drainage of right cheek/oral cavity abscess  IV Vancomycin  Airway observation  Endocrine consult for blood sugar control

## 2017-01-23 PROCEDURE — 99232 SBSQ HOSP IP/OBS MODERATE 35: CPT

## 2017-01-23 RX ORDER — METFORMIN HYDROCHLORIDE 850 MG/1
1 TABLET ORAL
Qty: 60 | Refills: 0
Start: 2017-01-23 | End: 2017-02-22

## 2017-01-23 RX ORDER — INSULIN GLARGINE 100 [IU]/ML
25 INJECTION, SOLUTION SUBCUTANEOUS
Qty: 30 | Refills: 0
Start: 2017-01-23 | End: 2017-02-22

## 2017-01-23 RX ORDER — OXYCODONE AND ACETAMINOPHEN 5; 325 MG/1; MG/1
2 TABLET ORAL
Qty: 40 | Refills: 0
Start: 2017-01-23 | End: 2017-01-28

## 2017-01-23 RX ORDER — HYDROMORPHONE HYDROCHLORIDE 2 MG/ML
1 INJECTION INTRAMUSCULAR; INTRAVENOUS; SUBCUTANEOUS DAILY
Qty: 0 | Refills: 0 | Status: DISCONTINUED | OUTPATIENT
Start: 2017-01-23 | End: 2017-01-24

## 2017-01-23 RX ADMIN — Medication 10 UNIT(S): at 12:31

## 2017-01-23 RX ADMIN — INSULIN GLARGINE 25 UNIT(S): 100 INJECTION, SOLUTION SUBCUTANEOUS at 23:07

## 2017-01-23 RX ADMIN — AMPICILLIN SODIUM AND SULBACTAM SODIUM 200 GRAM(S): 250; 125 INJECTION, POWDER, FOR SUSPENSION INTRAMUSCULAR; INTRAVENOUS at 02:51

## 2017-01-23 RX ADMIN — METFORMIN HYDROCHLORIDE 1000 MILLIGRAM(S): 850 TABLET ORAL at 10:31

## 2017-01-23 RX ADMIN — METFORMIN HYDROCHLORIDE 1000 MILLIGRAM(S): 850 TABLET ORAL at 17:52

## 2017-01-23 RX ADMIN — AMPICILLIN SODIUM AND SULBACTAM SODIUM 200 GRAM(S): 250; 125 INJECTION, POWDER, FOR SUSPENSION INTRAMUSCULAR; INTRAVENOUS at 15:31

## 2017-01-23 RX ADMIN — AMPICILLIN SODIUM AND SULBACTAM SODIUM 200 GRAM(S): 250; 125 INJECTION, POWDER, FOR SUSPENSION INTRAMUSCULAR; INTRAVENOUS at 21:25

## 2017-01-23 RX ADMIN — MUPIROCIN 1 APPLICATION(S): 20 OINTMENT TOPICAL at 05:11

## 2017-01-23 RX ADMIN — AMPICILLIN SODIUM AND SULBACTAM SODIUM 200 GRAM(S): 250; 125 INJECTION, POWDER, FOR SUSPENSION INTRAMUSCULAR; INTRAVENOUS at 09:31

## 2017-01-23 RX ADMIN — MUPIROCIN 1 APPLICATION(S): 20 OINTMENT TOPICAL at 17:52

## 2017-01-23 RX ADMIN — Medication 10 UNIT(S): at 10:31

## 2017-01-23 RX ADMIN — Medication 10 UNIT(S): at 17:52

## 2017-01-23 NOTE — PROVIDER CONTACT NOTE (OTHER) - REASON
Elevated HR
R. cheek dressing w/ drainage. Do you want me to change dressing?
pt states MD stated lantus for bedtime would be discontinued

## 2017-01-23 NOTE — PROVIDER CONTACT NOTE (OTHER) - SITUATION
R. cheek dressing w/ moderate amounts of tan drainage. Do you want me to change dressing?
Pt's . Rechecked, 105.
pt states MD stated lantus for bedtime would be discontinued

## 2017-01-24 PROCEDURE — 99232 SBSQ HOSP IP/OBS MODERATE 35: CPT

## 2017-01-24 RX ADMIN — HYDROMORPHONE HYDROCHLORIDE 1 MILLIGRAM(S): 2 INJECTION INTRAMUSCULAR; INTRAVENOUS; SUBCUTANEOUS at 05:59

## 2017-01-24 RX ADMIN — HYDROMORPHONE HYDROCHLORIDE 1 MILLIGRAM(S): 2 INJECTION INTRAMUSCULAR; INTRAVENOUS; SUBCUTANEOUS at 06:14

## 2017-01-24 RX ADMIN — MUPIROCIN 1 APPLICATION(S): 20 OINTMENT TOPICAL at 05:51

## 2017-01-24 RX ADMIN — MUPIROCIN 1 APPLICATION(S): 20 OINTMENT TOPICAL at 18:18

## 2017-01-24 RX ADMIN — METFORMIN HYDROCHLORIDE 1000 MILLIGRAM(S): 850 TABLET ORAL at 18:17

## 2017-01-24 RX ADMIN — Medication 10 UNIT(S): at 09:23

## 2017-01-24 RX ADMIN — METFORMIN HYDROCHLORIDE 1000 MILLIGRAM(S): 850 TABLET ORAL at 09:23

## 2017-01-24 RX ADMIN — INSULIN GLARGINE 25 UNIT(S): 100 INJECTION, SOLUTION SUBCUTANEOUS at 22:16

## 2017-01-24 RX ADMIN — AMPICILLIN SODIUM AND SULBACTAM SODIUM 200 GRAM(S): 250; 125 INJECTION, POWDER, FOR SUSPENSION INTRAMUSCULAR; INTRAVENOUS at 22:16

## 2017-01-24 RX ADMIN — AMPICILLIN SODIUM AND SULBACTAM SODIUM 200 GRAM(S): 250; 125 INJECTION, POWDER, FOR SUSPENSION INTRAMUSCULAR; INTRAVENOUS at 09:22

## 2017-01-24 RX ADMIN — AMPICILLIN SODIUM AND SULBACTAM SODIUM 200 GRAM(S): 250; 125 INJECTION, POWDER, FOR SUSPENSION INTRAMUSCULAR; INTRAVENOUS at 16:02

## 2017-01-24 RX ADMIN — Medication 10 UNIT(S): at 18:16

## 2017-01-24 RX ADMIN — Medication 10 UNIT(S): at 12:45

## 2017-01-24 RX ADMIN — AMPICILLIN SODIUM AND SULBACTAM SODIUM 200 GRAM(S): 250; 125 INJECTION, POWDER, FOR SUSPENSION INTRAMUSCULAR; INTRAVENOUS at 03:35

## 2017-01-25 LAB — BACTERIA BLD CULT: SIGNIFICANT CHANGE UP

## 2017-01-25 PROCEDURE — 99232 SBSQ HOSP IP/OBS MODERATE 35: CPT

## 2017-01-25 RX ORDER — MORPHINE SULFATE 50 MG/1
4 CAPSULE, EXTENDED RELEASE ORAL ONCE
Qty: 0 | Refills: 0 | Status: DISCONTINUED | OUTPATIENT
Start: 2017-01-25 | End: 2017-01-25

## 2017-01-25 RX ORDER — INSULIN LISPRO 100/ML
8 VIAL (ML) SUBCUTANEOUS
Qty: 0 | Refills: 0 | Status: DISCONTINUED | OUTPATIENT
Start: 2017-01-25 | End: 2017-01-25

## 2017-01-25 RX ORDER — INSULIN LISPRO 100/ML
8 VIAL (ML) SUBCUTANEOUS
Qty: 0 | Refills: 0 | Status: DISCONTINUED | OUTPATIENT
Start: 2017-01-25 | End: 2017-01-26

## 2017-01-25 RX ADMIN — METFORMIN HYDROCHLORIDE 1000 MILLIGRAM(S): 850 TABLET ORAL at 08:55

## 2017-01-25 RX ADMIN — MUPIROCIN 1 APPLICATION(S): 20 OINTMENT TOPICAL at 18:35

## 2017-01-25 RX ADMIN — AMPICILLIN SODIUM AND SULBACTAM SODIUM 200 GRAM(S): 250; 125 INJECTION, POWDER, FOR SUSPENSION INTRAMUSCULAR; INTRAVENOUS at 08:55

## 2017-01-25 RX ADMIN — Medication 8 UNIT(S): at 18:22

## 2017-01-25 RX ADMIN — Medication 10 UNIT(S): at 08:55

## 2017-01-25 RX ADMIN — INSULIN GLARGINE 25 UNIT(S): 100 INJECTION, SOLUTION SUBCUTANEOUS at 22:36

## 2017-01-25 RX ADMIN — AMPICILLIN SODIUM AND SULBACTAM SODIUM 200 GRAM(S): 250; 125 INJECTION, POWDER, FOR SUSPENSION INTRAMUSCULAR; INTRAVENOUS at 03:27

## 2017-01-25 RX ADMIN — Medication 1 TABLET(S): at 18:34

## 2017-01-25 RX ADMIN — METFORMIN HYDROCHLORIDE 1000 MILLIGRAM(S): 850 TABLET ORAL at 18:34

## 2017-01-25 RX ADMIN — Medication 10 UNIT(S): at 13:24

## 2017-01-25 RX ADMIN — MUPIROCIN 1 APPLICATION(S): 20 OINTMENT TOPICAL at 05:40

## 2017-01-26 VITALS
TEMPERATURE: 97 F | DIASTOLIC BLOOD PRESSURE: 82 MMHG | SYSTOLIC BLOOD PRESSURE: 122 MMHG | OXYGEN SATURATION: 99 % | HEART RATE: 90 BPM | RESPIRATION RATE: 18 BRPM

## 2017-01-26 LAB
BUN SERPL-MCNC: 8 MG/DL — SIGNIFICANT CHANGE UP (ref 7–23)
CALCIUM SERPL-MCNC: 9.9 MG/DL — SIGNIFICANT CHANGE UP (ref 8.4–10.5)
CHLORIDE SERPL-SCNC: 99 MMOL/L — SIGNIFICANT CHANGE UP (ref 98–107)
CO2 SERPL-SCNC: 26 MMOL/L — SIGNIFICANT CHANGE UP (ref 22–31)
CREAT SERPL-MCNC: 0.84 MG/DL — SIGNIFICANT CHANGE UP (ref 0.5–1.3)
GLUCOSE SERPL-MCNC: 72 MG/DL — SIGNIFICANT CHANGE UP (ref 70–99)
HCT VFR BLD CALC: 41 % — SIGNIFICANT CHANGE UP (ref 39–50)
HGB BLD-MCNC: 13.7 G/DL — SIGNIFICANT CHANGE UP (ref 13–17)
MCHC RBC-ENTMCNC: 24.8 PG — LOW (ref 27–34)
MCHC RBC-ENTMCNC: 33.4 % — SIGNIFICANT CHANGE UP (ref 32–36)
MCV RBC AUTO: 74.1 FL — LOW (ref 80–100)
PLATELET # BLD AUTO: 311 K/UL — SIGNIFICANT CHANGE UP (ref 150–400)
PMV BLD: 9.4 FL — SIGNIFICANT CHANGE UP (ref 7–13)
POTASSIUM SERPL-MCNC: 4.1 MMOL/L — SIGNIFICANT CHANGE UP (ref 3.5–5.3)
POTASSIUM SERPL-SCNC: 4.1 MMOL/L — SIGNIFICANT CHANGE UP (ref 3.5–5.3)
RBC # BLD: 5.53 M/UL — SIGNIFICANT CHANGE UP (ref 4.2–5.8)
RBC # FLD: 12.9 % — SIGNIFICANT CHANGE UP (ref 10.3–14.5)
SODIUM SERPL-SCNC: 140 MMOL/L — SIGNIFICANT CHANGE UP (ref 135–145)
WBC # BLD: 6.1 K/UL — SIGNIFICANT CHANGE UP (ref 3.8–10.5)
WBC # FLD AUTO: 6.1 K/UL — SIGNIFICANT CHANGE UP (ref 3.8–10.5)

## 2017-01-26 PROCEDURE — 99232 SBSQ HOSP IP/OBS MODERATE 35: CPT

## 2017-01-26 PROCEDURE — 99239 HOSP IP/OBS DSCHRG MGMT >30: CPT

## 2017-01-26 RX ORDER — INSULIN LISPRO 100/ML
8 VIAL (ML) SUBCUTANEOUS
Qty: 1 | Refills: 0 | OUTPATIENT
Start: 2017-01-26 | End: 2017-02-25

## 2017-01-26 RX ORDER — INSULIN LISPRO 100/ML
8 VIAL (ML) SUBCUTANEOUS
Qty: 1 | Refills: 0
Start: 2017-01-26 | End: 2017-02-25

## 2017-01-26 RX ADMIN — MUPIROCIN 1 APPLICATION(S): 20 OINTMENT TOPICAL at 17:23

## 2017-01-26 RX ADMIN — METFORMIN HYDROCHLORIDE 1000 MILLIGRAM(S): 850 TABLET ORAL at 17:23

## 2017-01-26 RX ADMIN — Medication 8 UNIT(S): at 13:36

## 2017-01-26 RX ADMIN — Medication 1 TABLET(S): at 06:13

## 2017-01-26 RX ADMIN — Medication 8 UNIT(S): at 09:17

## 2017-01-26 RX ADMIN — METFORMIN HYDROCHLORIDE 1000 MILLIGRAM(S): 850 TABLET ORAL at 09:17

## 2017-01-26 RX ADMIN — Medication 8 UNIT(S): at 17:23

## 2017-01-26 RX ADMIN — Medication 1 TABLET(S): at 17:23

## 2017-01-26 RX ADMIN — MUPIROCIN 1 APPLICATION(S): 20 OINTMENT TOPICAL at 06:14

## 2017-01-27 ENCOUNTER — RX RENEWAL (OUTPATIENT)
Age: 37
End: 2017-01-27

## 2017-01-31 ENCOUNTER — APPOINTMENT (OUTPATIENT)
Dept: INFECTIOUS DISEASE | Facility: CLINIC | Age: 37
End: 2017-01-31

## 2017-01-31 VITALS
WEIGHT: 231 LBS | OXYGEN SATURATION: 99 % | DIASTOLIC BLOOD PRESSURE: 87 MMHG | SYSTOLIC BLOOD PRESSURE: 114 MMHG | HEIGHT: 74 IN | BODY MASS INDEX: 29.65 KG/M2 | HEART RATE: 111 BPM | TEMPERATURE: 97.9 F

## 2017-01-31 DIAGNOSIS — B96.1 KLEBSIELLA PNEUMONIAE [K. PNEUMONIAE] AS THE CAUSE OF DISEASES CLASSIFIED ELSEWHERE: ICD-10-CM

## 2017-01-31 DIAGNOSIS — L02.01 CUTANEOUS ABSCESS OF FACE: ICD-10-CM

## 2017-02-03 ENCOUNTER — APPOINTMENT (OUTPATIENT)
Dept: ENDOCRINOLOGY | Facility: CLINIC | Age: 37
End: 2017-02-03

## 2017-02-03 VITALS
OXYGEN SATURATION: 98 % | WEIGHT: 234 LBS | BODY MASS INDEX: 30.03 KG/M2 | DIASTOLIC BLOOD PRESSURE: 74 MMHG | HEART RATE: 69 BPM | SYSTOLIC BLOOD PRESSURE: 118 MMHG | HEIGHT: 74 IN

## 2017-02-03 DIAGNOSIS — Z60.2 PROBLEMS RELATED TO LIVING ALONE: ICD-10-CM

## 2017-02-03 SDOH — SOCIAL STABILITY - SOCIAL INSECURITY: PROBLEMS RELATED TO LIVING ALONE: Z60.2

## 2017-02-08 LAB
CREAT SPEC-SCNC: 145 MG/DL
MICROALBUMIN 24H UR DL<=1MG/L-MCNC: 4 MG/DL
MICROALBUMIN/CREAT 24H UR-RTO: 28 UG/MG

## 2017-04-20 ENCOUNTER — TRANSCRIPTION ENCOUNTER (OUTPATIENT)
Age: 37
End: 2017-04-20

## 2017-05-17 ENCOUNTER — APPOINTMENT (OUTPATIENT)
Dept: ENDOCRINOLOGY | Facility: CLINIC | Age: 37
End: 2017-05-17

## 2018-07-06 NOTE — ED CDU PROVIDER NOTE - NEURO NEGATIVE STATEMENT, MLM
no
no loss of consciousness, no gait abnormality, no headache, no sensory deficits, and no weakness.

## 2018-09-05 NOTE — DIETITIAN INITIAL EVALUATION ADULT. - PATIENT PROFILE REVIEWED
Hide Additional Notes?: No
yes
Detail Level: Zone
Include Location In Plan?: Yes
Detail Level: Detailed

## 2019-02-13 ENCOUNTER — APPOINTMENT (OUTPATIENT)
Dept: WOUND CARE | Facility: CLINIC | Age: 39
End: 2019-02-13

## 2019-10-02 PROBLEM — Z60.2 PERSON LIVING ALONE: Status: ACTIVE | Noted: 2017-02-03

## 2020-01-08 NOTE — PROVIDER CONTACT NOTE (OTHER) - ASSESSMENT
History of A fib maintained on Lopressor and Cardizem   Not on anticoagulation due to bleeding Pt tolerating consistent carb diet. Bedtime glucose was 137. Pt due for 25 units of lantus

## 2022-11-10 ENCOUNTER — APPOINTMENT (OUTPATIENT)
Dept: RADIOLOGY | Facility: HOSPITAL | Age: 42
End: 2022-11-10

## 2022-11-10 ENCOUNTER — RESULT REVIEW (OUTPATIENT)
Age: 42
End: 2022-11-10

## 2022-11-10 ENCOUNTER — OUTPATIENT (OUTPATIENT)
Dept: OUTPATIENT SERVICES | Facility: HOSPITAL | Age: 42
LOS: 1 days | End: 2022-11-10

## 2022-11-10 DIAGNOSIS — R76.12 NONSPECIFIC REACTION TO CELL MEDIATED IMMUNITY MEASUREMENT OF GAMMA INTERFERON ANTIGEN RESPONSE WITHOUT ACTIVE TUBERCULOSIS: ICD-10-CM

## 2022-11-10 PROCEDURE — 71046 X-RAY EXAM CHEST 2 VIEWS: CPT | Mod: 26

## 2022-11-12 ENCOUNTER — TRANSCRIPTION ENCOUNTER (OUTPATIENT)
Age: 42
End: 2022-11-12

## 2023-01-23 DIAGNOSIS — Z00.00 ENCOUNTER FOR GENERAL ADULT MEDICAL EXAMINATION W/OUT ABNORMAL FINDINGS: ICD-10-CM

## 2023-01-31 ENCOUNTER — APPOINTMENT (OUTPATIENT)
Dept: INTERNAL MEDICINE | Facility: CLINIC | Age: 43
End: 2023-01-31
Payer: COMMERCIAL

## 2023-01-31 PROCEDURE — 36415 COLL VENOUS BLD VENIPUNCTURE: CPT

## 2023-02-02 LAB
ALBUMIN SERPL ELPH-MCNC: 3.3 G/DL
ALP BLD-CCNC: 68 U/L
ALT SERPL-CCNC: 17 U/L
ANION GAP SERPL CALC-SCNC: 8 MMOL/L
AST SERPL-CCNC: 17 U/L
BASOPHILS # BLD AUTO: 0.12 K/UL
BASOPHILS NFR BLD AUTO: 1.2 %
BILIRUB SERPL-MCNC: 0.2 MG/DL
BUN SERPL-MCNC: 25 MG/DL
CALCIUM SERPL-MCNC: 9.7 MG/DL
CHLORIDE SERPL-SCNC: 105 MMOL/L
CHOLEST SERPL-MCNC: 170 MG/DL
CO2 SERPL-SCNC: 24 MMOL/L
CREAT SERPL-MCNC: 2.81 MG/DL
CREAT SPEC-SCNC: 46 MG/DL
EGFR: 28 ML/MIN/1.73M2
EOSINOPHIL # BLD AUTO: 0.78 K/UL
EOSINOPHIL NFR BLD AUTO: 7.8 %
ESTIMATED AVERAGE GLUCOSE: 157 MG/DL
GLUCOSE SERPL-MCNC: 199 MG/DL
HBA1C MFR BLD HPLC: 7.1 %
HCT VFR BLD CALC: 32.2 %
HDLC SERPL-MCNC: 37 MG/DL
HGB BLD-MCNC: 10 G/DL
IMM GRANULOCYTES NFR BLD AUTO: 0.5 %
LDLC SERPL CALC-MCNC: 72 MG/DL
LYMPHOCYTES # BLD AUTO: 1.9 K/UL
LYMPHOCYTES NFR BLD AUTO: 19.1 %
MAN DIFF?: NORMAL
MCHC RBC-ENTMCNC: 23.1 PG
MCHC RBC-ENTMCNC: 31.1 GM/DL
MCV RBC AUTO: 74.5 FL
MICROALBUMIN 24H UR DL<=1MG/L-MCNC: 365.5 MG/DL
MICROALBUMIN/CREAT 24H UR-RTO: 7975 MG/G
MONOCYTES # BLD AUTO: 0.77 K/UL
MONOCYTES NFR BLD AUTO: 7.7 %
NEUTROPHILS # BLD AUTO: 6.32 K/UL
NEUTROPHILS NFR BLD AUTO: 63.7 %
NONHDLC SERPL-MCNC: 133 MG/DL
PLATELET # BLD AUTO: 277 K/UL
POTASSIUM SERPL-SCNC: 5.5 MMOL/L
PROT SERPL-MCNC: 7.4 G/DL
RBC # BLD: 4.32 M/UL
RBC # FLD: 16.1 %
SODIUM SERPL-SCNC: 136 MMOL/L
T4 FREE SERPL-MCNC: 1 NG/DL
TRIGL SERPL-MCNC: 304 MG/DL
TSH SERPL-ACNC: 3.52 UIU/ML
WBC # FLD AUTO: 9.94 K/UL

## 2023-02-07 ENCOUNTER — LABORATORY RESULT (OUTPATIENT)
Age: 43
End: 2023-02-07

## 2023-02-07 ENCOUNTER — APPOINTMENT (OUTPATIENT)
Dept: INTERNAL MEDICINE | Facility: CLINIC | Age: 43
End: 2023-02-07
Payer: COMMERCIAL

## 2023-02-07 VITALS
WEIGHT: 243 LBS | TEMPERATURE: 97 F | OXYGEN SATURATION: 99 % | BODY MASS INDEX: 31.18 KG/M2 | DIASTOLIC BLOOD PRESSURE: 100 MMHG | SYSTOLIC BLOOD PRESSURE: 140 MMHG | HEIGHT: 74 IN | HEART RATE: 116 BPM

## 2023-02-07 PROCEDURE — 36415 COLL VENOUS BLD VENIPUNCTURE: CPT

## 2023-02-07 PROCEDURE — 99386 PREV VISIT NEW AGE 40-64: CPT | Mod: 25

## 2023-02-07 PROCEDURE — 93000 ELECTROCARDIOGRAM COMPLETE: CPT | Mod: 59

## 2023-02-07 PROCEDURE — G0444 DEPRESSION SCREEN ANNUAL: CPT | Mod: 59

## 2023-02-07 NOTE — HISTORY OF PRESENT ILLNESS
[FreeTextEntry1] : Patient presents for comprehensive medical evaluation today. \par Establish care  [de-identified] : PMHx Type II DM, HLD, BABIN, HTN \par \par Dr. Christen Blackwell - [facility] - [place], [place] \par Changed job so switching PCP\par \par Otherwise feels well \par \par ?Patient reports high iron in the kidneys - but reports they could not figure out the cause of kidney disease

## 2023-02-07 NOTE — PHYSICAL EXAM
[Normal] : soft, non-tender, non-distended, no masses palpated, no HSM and normal bowel sounds [de-identified] : bilateral wounds on shins - without purulence, no surrounding erythema, edema  [de-identified] : proprioception impaired, sensation diminished in bilateral feet

## 2023-02-07 NOTE — ASSESSMENT
[FreeTextEntry1] : HCM\par \par *Immunizations - will obtain records \par COVID -19 UTD\par Influenza UTD\par Pneumococcal - \par TDAP - \par \par Preventive medicine discussed - including importance of lifestyle modification - with incorporation of healthy diet + regular exercise\par \par \par #CKD vs GABI on CKD \par #Hyperkalemia\par need previous PCP records to establish baseline CR \par nephrology referral \par \par \par ** Patient poor historian - and does not recall many of the details of his medical history - will need to acquire previous PCP records before making medication changes \par \par \par #Type II DM uncontrolled \par A1C 7 \par will obtain records - keep current regimen for now \par previously on insulin \par microalbumin cr ratio elevated, will obtain records and wait for repeat labs before initiating ACE/ARB\par \par #Wound care \par nonhealing wounds on bilateral shins related to uncontrolled diabetes \par wound care referral\par CT extremity ordered \par \par \par \par

## 2023-02-07 NOTE — HEALTH RISK ASSESSMENT
[Good] : ~his/her~  mood as  good [Monthly or less (1 pt)] : Monthly or less (1 point) [1 or 2 (0 pts)] : 1 or 2 (0 points) [Never (0 pts)] : Never (0 points) [No] : In the past 12 months have you used drugs other than those required for medical reasons? No [0] : 2) Feeling down, depressed, or hopeless: Not at all (0) [PHQ-2 Negative - No further assessment needed] : PHQ-2 Negative - No further assessment needed [HIV Test offered] : HIV Test offered [Hepatitis C test offered] : Hepatitis C test offered [None] : None [Alone] : lives alone [Employed] : employed [Single] : single [Fully functional (bathing, dressing, toileting, transferring, walking, feeding)] : Fully functional (bathing, dressing, toileting, transferring, walking, feeding) [Fully functional (using the telephone, shopping, preparing meals, housekeeping, doing laundry, using] : Fully functional and needs no help or supervision to perform IADLs (using the telephone, shopping, preparing meals, housekeeping, doing laundry, using transportation, managing medications and managing finances) [Reports changes in vision] : Reports changes in vision [de-identified] : sedentary  [de-identified] : takeout - unhealthy  [[id number]] : 0 [Change in mental status noted] : No change in mental status noted [Language] : denies difficulty with language [Handling Complex Tasks] : denies difficulty handling complex tasks [Reports changes in hearing] : Reports no changes in hearing [Reports changes in dental health] : Reports no changes in dental health [Smoke Detector] : no smoke detector [Safety elements used in home] : no safety elements used in home [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

## 2023-02-08 LAB
ALBUMIN SERPL ELPH-MCNC: 3.7 G/DL
ALP BLD-CCNC: 71 U/L
ALT SERPL-CCNC: 17 U/L
ANION GAP SERPL CALC-SCNC: 15 MMOL/L
APPEARANCE: CLEAR
AST SERPL-CCNC: 17 U/L
BASOPHILS # BLD AUTO: 0.11 K/UL
BASOPHILS NFR BLD AUTO: 1.2 %
BILIRUB SERPL-MCNC: 0.2 MG/DL
BILIRUBIN URINE: NEGATIVE
BLOOD URINE: ABNORMAL
BUN SERPL-MCNC: 27 MG/DL
CALCIUM SERPL-MCNC: 8.9 MG/DL
CHLORIDE SERPL-SCNC: 103 MMOL/L
CO2 SERPL-SCNC: 20 MMOL/L
COLOR: NORMAL
CREAT SERPL-MCNC: 3.25 MG/DL
EGFR: 24 ML/MIN/1.73M2
EOSINOPHIL # BLD AUTO: 0.78 K/UL
EOSINOPHIL NFR BLD AUTO: 8.8 %
FERRITIN SERPL-MCNC: 656 NG/ML
FOLATE SERPL-MCNC: 10.9 NG/ML
GLUCOSE QUALITATIVE U: ABNORMAL
GLUCOSE SERPL-MCNC: 255 MG/DL
HAPTOGLOB SERPL-MCNC: 106 MG/DL
HCT VFR BLD CALC: 35 %
HGB BLD-MCNC: 11 G/DL
IMM GRANULOCYTES NFR BLD AUTO: 0.4 %
IRON SATN MFR SERPL: 28 %
IRON SERPL-MCNC: 85 UG/DL
KETONES URINE: NEGATIVE
LDH SERPL-CCNC: 255 U/L
LEUKOCYTE ESTERASE URINE: NEGATIVE
LYMPHOCYTES # BLD AUTO: 1.51 K/UL
LYMPHOCYTES NFR BLD AUTO: 17 %
MAN DIFF?: NORMAL
MCHC RBC-ENTMCNC: 23.6 PG
MCHC RBC-ENTMCNC: 31.4 GM/DL
MCV RBC AUTO: 75.1 FL
MONOCYTES # BLD AUTO: 0.64 K/UL
MONOCYTES NFR BLD AUTO: 7.2 %
NEUTROPHILS # BLD AUTO: 5.82 K/UL
NEUTROPHILS NFR BLD AUTO: 65.4 %
NITRITE URINE: NEGATIVE
PH URINE: 7
PLATELET # BLD AUTO: 315 K/UL
POTASSIUM SERPL-SCNC: 4.8 MMOL/L
PROT SERPL-MCNC: 7.4 G/DL
PROTEIN URINE: ABNORMAL
RBC # BLD: 4.66 M/UL
RBC # BLD: 4.66 M/UL
RBC # FLD: 16.1 %
RETICS # AUTO: 3.2 %
RETICS AGGREG/RBC NFR: 146.8 K/UL
SODIUM SERPL-SCNC: 137 MMOL/L
SPECIFIC GRAVITY URINE: 1.01
TIBC SERPL-MCNC: 307 UG/DL
UIBC SERPL-MCNC: 222 UG/DL
UROBILINOGEN URINE: NORMAL
WBC # FLD AUTO: 8.9 K/UL

## 2023-02-12 ENCOUNTER — OUTPATIENT (OUTPATIENT)
Dept: OUTPATIENT SERVICES | Facility: HOSPITAL | Age: 43
LOS: 1 days | End: 2023-02-12
Payer: COMMERCIAL

## 2023-02-12 ENCOUNTER — APPOINTMENT (OUTPATIENT)
Dept: CT IMAGING | Facility: IMAGING CENTER | Age: 43
End: 2023-02-12
Payer: COMMERCIAL

## 2023-02-12 DIAGNOSIS — S91.309A UNSPECIFIED OPEN WOUND, UNSPECIFIED FOOT, INITIAL ENCOUNTER: ICD-10-CM

## 2023-02-12 PROCEDURE — 73700 CT LOWER EXTREMITY W/O DYE: CPT

## 2023-02-12 PROCEDURE — 73700 CT LOWER EXTREMITY W/O DYE: CPT | Mod: 26,50

## 2023-02-15 LAB
ALBUMIN MFR SERPL ELPH: 49.6 %
ALBUMIN SERPL-MCNC: 3.7 G/DL
ALBUMIN/GLOB SERPL: 1 RATIO
ALPHA1 GLOB MFR SERPL ELPH: 4.7 %
ALPHA1 GLOB SERPL ELPH-MCNC: 0.3 G/DL
ALPHA2 GLOB MFR SERPL ELPH: 16.1 %
ALPHA2 GLOB SERPL ELPH-MCNC: 1.2 G/DL
B-GLOBULIN MFR SERPL ELPH: 13.5 %
B-GLOBULIN SERPL ELPH-MCNC: 1 G/DL
GAMMA GLOB FLD ELPH-MCNC: 1.2 G/DL
GAMMA GLOB MFR SERPL ELPH: 16.1 %
INTERPRETATION SERPL IEP-IMP: NORMAL
PROT SERPL-MCNC: 7.4 G/DL
PROT SERPL-MCNC: 7.4 G/DL

## 2023-02-28 ENCOUNTER — APPOINTMENT (OUTPATIENT)
Dept: WOUND CARE | Facility: CLINIC | Age: 43
End: 2023-02-28
Payer: COMMERCIAL

## 2023-02-28 VITALS
DIASTOLIC BLOOD PRESSURE: 119 MMHG | HEART RATE: 115 BPM | RESPIRATION RATE: 18 BRPM | SYSTOLIC BLOOD PRESSURE: 177 MMHG | OXYGEN SATURATION: 99 % | TEMPERATURE: 98 F

## 2023-02-28 DIAGNOSIS — Z86.39 PERSONAL HISTORY OF OTHER ENDOCRINE, NUTRITIONAL AND METABOLIC DISEASE: ICD-10-CM

## 2023-02-28 PROCEDURE — 99204 OFFICE O/P NEW MOD 45 MIN: CPT

## 2023-02-28 NOTE — HISTORY OF PRESENT ILLNESS
[FreeTextEntry1] : Mr. MAIRFER MILLER with htn, diabetes, hld    presents to the office with a wound for 2 months duration. \par  The wound is located on  the right leg.  healing wound also on left leg\par   The patient has complaints of pain.   The patient has been dressing the wound with bacintracin.\par   The patient denies fevers or chills. \par  The patient has localized pain to the wound upon dressing changes. \par  The patient has no other complaints or associated symptoms.  HbA1c is  7.2.\par

## 2023-02-28 NOTE — REASON FOR VISIT
[Consultation] : a consultation visit [FreeTextEntry1] : bilateral leg ulcers due to friction burn while hiking

## 2023-02-28 NOTE — PHYSICAL EXAM
[Normal Breath Sounds] : Normal breath sounds [Normal Rate and Rhythm] : normal rate and rhythm [2+] : left 2+ [Skin Ulcer] : ulcer [Skin Induration] : induration [Alert] : alert [Oriented to Person] : oriented to person [Oriented to Place] : oriented to place [Oriented to Time] : oriented to time [Calm] : calm [Please See PDF for Tissue Analytics] : Please See PDF for Tissue Analytics. [JVD] : no jugular venous distention  [1+] : left 1+ [Ankle Swelling (On Exam)] : present [Ankle Swelling Bilaterally] : bilaterally  [Ankle Swelling On The Right] : mild [Abdomen Tenderness] : ~T ~M No abdominal tenderness [de-identified] : nad [de-identified] : jason [de-identified] : nl [de-identified] : rom intact [de-identified] : unconcerned about legs [de-identified] : seems unconcerned

## 2023-02-28 NOTE — ASSESSMENT
[FreeTextEntry1] : Wound Assessment and Plan:\par 42 yr old m with bilateral leg ulcers, states s/p mad and friction, venous skin pigmentation\par needs us , had ct no skin abscess\par \par The patient presents with a wound to the bilateral  leg ulcers.   Swelling noted to the extremity.  \par dry flaky skin to periwound\par s/p excisional debridement\par No clinical sign of infection\par Recommendation:\par \par Apply lidocaine or topical anesthetic if needed to reduce pain upon washing the wound.\par Wash wound with ---- Dove skin sensitive soap and clean water \par Apply ---- medical grade honey// alginate/ deion, ace\par Change dressing ---daily.\par Leg elevation as tolerated\par \par Moisturize legs daily\par Supplies ordered\par

## 2023-03-10 ENCOUNTER — APPOINTMENT (OUTPATIENT)
Dept: INTERNAL MEDICINE | Facility: CLINIC | Age: 43
End: 2023-03-10
Payer: COMMERCIAL

## 2023-03-10 VITALS
HEIGHT: 74 IN | OXYGEN SATURATION: 98 % | DIASTOLIC BLOOD PRESSURE: 95 MMHG | WEIGHT: 241 LBS | TEMPERATURE: 97.4 F | HEART RATE: 122 BPM | SYSTOLIC BLOOD PRESSURE: 156 MMHG | BODY MASS INDEX: 30.93 KG/M2

## 2023-03-10 PROCEDURE — 99214 OFFICE O/P EST MOD 30 MIN: CPT

## 2023-03-10 RX ORDER — AMOXICILLIN AND CLAVULANATE POTASSIUM 875; 125 MG/1; 1/1
875-125 TABLET, FILM COATED ORAL
Refills: 0 | Status: DISCONTINUED | COMMUNITY
End: 2023-03-10

## 2023-03-10 RX ORDER — METFORMIN HYDROCHLORIDE 1000 MG/1
1000 TABLET, COATED ORAL
Refills: 0 | Status: DISCONTINUED | COMMUNITY
End: 2023-03-10

## 2023-03-10 NOTE — PHYSICAL EXAM
[Normal] : soft, non-tender, non-distended, no masses palpated, no HSM and normal bowel sounds [de-identified] : bilateral wounds on shins - wrapped [de-identified] : proprioception impaired, sensation diminished in bilateral feet

## 2023-03-10 NOTE — HISTORY OF PRESENT ILLNESS
[de-identified] : PMHx Type II DM, HLD, BABIN, HTN \par \par Dr. Christen Blackwell - [facility] - [place], [place] \par Ophtho- Dr. Carty - early retinopathy - given b/l injections \par Wound care doctor has given instructions, CT without evidence of infection - healing much better \par \par Otherwise feels well  [FreeTextEntry1] : Patient presents today for follow-up of chronic medical conditions.

## 2023-03-10 NOTE — HEALTH RISK ASSESSMENT
[Monthly or less (1 pt)] : Monthly or less (1 point) [1 or 2 (0 pts)] : 1 or 2 (0 points) [Never (0 pts)] : Never (0 points) [No] : In the past 12 months have you used drugs other than those required for medical reasons? No [0] : 2) Feeling down, depressed, or hopeless: Not at all (0) [PHQ-2 Negative - No further assessment needed] : PHQ-2 Negative - No further assessment needed [Good] : ~his/her~  mood as  good [HIV Test offered] : HIV Test offered [Hepatitis C test offered] : Hepatitis C test offered [None] : None [Alone] : lives alone [Employed] : employed [Single] : single [Fully functional (bathing, dressing, toileting, transferring, walking, feeding)] : Fully functional (bathing, dressing, toileting, transferring, walking, feeding) [Fully functional (using the telephone, shopping, preparing meals, housekeeping, doing laundry, using] : Fully functional and needs no help or supervision to perform IADLs (using the telephone, shopping, preparing meals, housekeeping, doing laundry, using transportation, managing medications and managing finances) [Reports changes in vision] : Reports changes in vision [de-identified] : sedentary  [de-identified] : takeout - unhealthy  [[id number]] : 0 [Change in mental status noted] : No change in mental status noted [Language] : denies difficulty with language [Handling Complex Tasks] : denies difficulty handling complex tasks [Reports changes in hearing] : Reports no changes in hearing [Reports changes in dental health] : Reports no changes in dental health [Smoke Detector] : no smoke detector [Safety elements used in home] : no safety elements used in home [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

## 2023-03-10 NOTE — ASSESSMENT
[FreeTextEntry1] : HCM\par \par *Immunizations - will obtain records \par COVID -19 UTD\par Influenza UTD\par Pneumococcal - \par TDAP - \par \par Preventive medicine discussed - including importance of lifestyle modification - with incorporation of healthy diet + regular exercise\par \par \par #CKD vs GABI on CKD \par #Hyperkalemia\par likely in setting of uncontrolled DM, HTN - pt reports previous w/u negative for alternate etiology \par reports 3 years ago started having kidney issues - w/u including MRI was negative \par need previous PCP records to establish baseline CR \par given fluctuating BP will defer addition of ACE/ARB at this time \par nephrology referral - will expedite appt\par \par \par ** Patient poor historian - and does not recall many of the details of his medical history - will need to acquire previous PCP records before making medication changes \par \par \par #Type II DM uncontrolled \par A1C 7 \par ophtho - UTD - early retinopathy 2/2023 \par will obtain records - keep current regimen for now \par previously on insulin \par A1C now 7 - previously established on farxiga, januvia will continue - renally adjusted \par microalbumin cr ratio elevated, will obtain records and wait for repeat labs before initiating ACE/ARB\par \par #Wound care \par nonhealing wounds on bilateral shins related to uncontrolled diabetes \par wound care specialist following - now healing better\par CT extremity without evidence of infection\par \par \par \par

## 2023-03-13 LAB
ALBUMIN SERPL ELPH-MCNC: 3.5 G/DL
ALP BLD-CCNC: 77 U/L
ALT SERPL-CCNC: 17 U/L
ANION GAP SERPL CALC-SCNC: 14 MMOL/L
AST SERPL-CCNC: 18 U/L
BASOPHILS # BLD AUTO: 0.12 K/UL
BASOPHILS NFR BLD AUTO: 1.2 %
BILIRUB SERPL-MCNC: 0.2 MG/DL
BUN SERPL-MCNC: 36 MG/DL
CALCIUM SERPL-MCNC: 8.1 MG/DL
CHLORIDE SERPL-SCNC: 104 MMOL/L
CHOLEST SERPL-MCNC: 151 MG/DL
CO2 SERPL-SCNC: 17 MMOL/L
CREAT SERPL-MCNC: 3.89 MG/DL
EGFR: 19 ML/MIN/1.73M2
EOSINOPHIL # BLD AUTO: 0.78 K/UL
EOSINOPHIL NFR BLD AUTO: 7.8 %
ESTIMATED AVERAGE GLUCOSE: 194 MG/DL
GLUCOSE SERPL-MCNC: 314 MG/DL
HBA1C MFR BLD HPLC: 8.4 %
HCT VFR BLD CALC: 32.6 %
HDLC SERPL-MCNC: 38 MG/DL
HGB BLD-MCNC: 10.2 G/DL
IMM GRANULOCYTES NFR BLD AUTO: 0.5 %
LDLC SERPL CALC-MCNC: 52 MG/DL
LYMPHOCYTES # BLD AUTO: 1.67 K/UL
LYMPHOCYTES NFR BLD AUTO: 16.6 %
MAN DIFF?: NORMAL
MCHC RBC-ENTMCNC: 23.7 PG
MCHC RBC-ENTMCNC: 31.3 GM/DL
MCV RBC AUTO: 75.8 FL
MONOCYTES # BLD AUTO: 0.61 K/UL
MONOCYTES NFR BLD AUTO: 6.1 %
NEUTROPHILS # BLD AUTO: 6.81 K/UL
NEUTROPHILS NFR BLD AUTO: 67.8 %
NONHDLC SERPL-MCNC: 113 MG/DL
PLATELET # BLD AUTO: 315 K/UL
POTASSIUM SERPL-SCNC: 5.1 MMOL/L
PROT SERPL-MCNC: 6.8 G/DL
RBC # BLD: 4.3 M/UL
RBC # FLD: 16.2 %
SODIUM SERPL-SCNC: 136 MMOL/L
TRIGL SERPL-MCNC: 307 MG/DL
WBC # FLD AUTO: 10.04 K/UL

## 2023-03-14 ENCOUNTER — APPOINTMENT (OUTPATIENT)
Dept: NEPHROLOGY | Facility: CLINIC | Age: 43
End: 2023-03-14
Payer: COMMERCIAL

## 2023-03-14 VITALS
DIASTOLIC BLOOD PRESSURE: 90 MMHG | HEART RATE: 110 BPM | WEIGHT: 240 LBS | HEIGHT: 74 IN | BODY MASS INDEX: 30.8 KG/M2 | SYSTOLIC BLOOD PRESSURE: 150 MMHG

## 2023-03-14 DIAGNOSIS — N18.31 CHRONIC KIDNEY DISEASE, STAGE 3A: ICD-10-CM

## 2023-03-14 DIAGNOSIS — Z84.1 FAMILY HISTORY OF DISORDERS OF KIDNEY AND URETER: ICD-10-CM

## 2023-03-14 PROCEDURE — 99204 OFFICE O/P NEW MOD 45 MIN: CPT

## 2023-03-14 RX ORDER — INSULIN GLARGINE 100 [IU]/ML
100 INJECTION, SOLUTION SUBCUTANEOUS
Refills: 0 | Status: DISCONTINUED | COMMUNITY
End: 2023-03-14

## 2023-03-14 RX ORDER — INSULIN LISPRO 100 [IU]/ML
100 INJECTION, SOLUTION INTRAVENOUS; SUBCUTANEOUS
Refills: 0 | Status: DISCONTINUED | COMMUNITY
End: 2023-03-14

## 2023-03-14 NOTE — ASSESSMENT
[FreeTextEntry1] : 42 yo male with DM, HTN, CKD4 and proteinuria\par CKD4 likely in setting of DM, HTN nephropathy\par Importance of the control of both discussed with Pt\par Renal sono with dopplers \par Proteinuria: Check serologies to evaluate for other causes of nephrotic range proteinuria. Continue Farxiga\par DM: seeing endo for eval. Need better glucose control\par HTN: suboptimal of goal <130/80. Home monitoring advised\par Low Na diet\par Start torsemide 10mg daily\par Retinopathy: seeing ophtho\par Ulcers: seeing wound care\par Discussed with pt the progressive nature of his CKD otherwise will need to discuss future need of transplant or renal replacement therapy\par Followup in 2 months\par All questions were answered

## 2023-03-14 NOTE — PHYSICAL EXAM
[General Appearance - Alert] : alert [General Appearance - In No Acute Distress] : in no acute distress [Sclera] : the sclera and conjunctiva were normal [Outer Ear] : the ears and nose were normal in appearance [Neck Appearance] : the appearance of the neck was normal [Auscultation Breath Sounds / Voice Sounds] : lungs were clear to auscultation bilaterally [Heart Rate And Rhythm] : heart rate was normal and rhythm regular [Heart Sounds] : normal S1 and S2 [Murmurs] : no murmurs [Heart Sounds Pericardial Friction Rub] : no pericardial rub [Arterial Pulses Carotid] : carotid pulses were normal with no bruits [FreeTextEntry1] : 1+ edema [Bowel Sounds] : normal bowel sounds [Abdomen Soft] : soft [Abdomen Tenderness] : non-tender [No CVA Tenderness] : no ~M costovertebral angle tenderness [Involuntary Movements] : no involuntary movements were seen [] : no rash [No Focal Deficits] : no focal deficits [Oriented To Time, Place, And Person] : oriented to person, place, and time [Affect] : the affect was normal [Mood] : the mood was normal

## 2023-03-14 NOTE — CONSULT LETTER
[Dear  ___] : Dear  [unfilled], [Consult Letter:] : I had the pleasure of evaluating your patient, [unfilled]. [( Thank you for referring [unfilled] for consultation for _____ )] : Thank you for referring [unfilled] for consultation for [unfilled] [Please see my note below.] : Please see my note below. [Consult Closing:] : Thank you very much for allowing me to participate in the care of this patient.  If you have any questions, please do not hesitate to contact me. [Sincerely,] : Sincerely, [FreeTextEntry3] : Rani Paniagua MD\par  [facility]\par Division of Nephrology and Hypertension\par \par

## 2023-03-14 NOTE — REVIEW OF SYSTEMS
[Eyesight Problems] : eyesight problems [Skin Wound] : skin wound [As Noted in HPI] : as noted in HPI [Negative] : Heme/Lymph [de-identified] : Anterior tibia

## 2023-03-14 NOTE — HISTORY OF PRESENT ILLNESS
[FreeTextEntry1] : 43-year-old male with history of longstanding diabetes, hypertension here to establish care for CKD stage IV.\par Patient had followed with a nephrologist in the past for proteinuria however been 3 years since follow-up.  In 2017 creatinine was normal at the time with no proteinuria.\par Now patient started following with internist and found to have a creatinine of 3-3.9.  He also has nephrotic range proteinuria 7 g.\par Patient also has poorly controlled hypertension.  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  He also has neuropathy.  He has lower extremity skin wounds seeing wound care. He does have edema of his legs for many years he states.\par He is trying to change his diet with less animal protein and eating more vegetables.

## 2023-03-22 ENCOUNTER — APPOINTMENT (OUTPATIENT)
Dept: ENDOCRINOLOGY | Facility: CLINIC | Age: 43
End: 2023-03-22
Payer: COMMERCIAL

## 2023-03-22 VITALS
WEIGHT: 240 LBS | SYSTOLIC BLOOD PRESSURE: 158 MMHG | TEMPERATURE: 97.4 F | HEIGHT: 74 IN | OXYGEN SATURATION: 99 % | DIASTOLIC BLOOD PRESSURE: 104 MMHG | BODY MASS INDEX: 30.8 KG/M2 | HEART RATE: 120 BPM

## 2023-03-22 PROCEDURE — 99205 OFFICE O/P NEW HI 60 MIN: CPT

## 2023-03-22 NOTE — ASSESSMENT
[FreeTextEntry1] : #Type 2 diabetes\par -Patient with longstanding history of type 2 diabetes since he was a teenager.  Mentions that he was initially started on metformin however it was discontinued 1 month ago due to his kidney dysfunction.\par -His hemoglobin A1c was 7.1% in January however he mentions that since then he stopped taking his Januvia and Farxiga and his hemoglobin A1c tate to 8.4%\par -Patient has since resumed taking his Januvia.  Patient is not checking his blood sugars consistently at home\par Plan:\par -Recommend patient to start taking his Januvia 25 mg consistently\par -Patient's diet appears to be heavy in carbohydrates.  Advised patient to make dietary changes. We spoke at length about eating a healthy diabetic diet.  This includes minimizing processed sugars and incorporating a low carbohydrate diet.  We spoke about how to minimize meals that are majority carbohydrates and incorporating more proteins and fats with each meal.\par -Advised patient to meet with our certified diabetes educator\par -We will prescribe patient a freestyle сергей to monitor his blood sugars more closely at home\par \par We spoke about the importance of exercise in the setting of diabetes.  This includes keeping themselves active for at least 30 minutes a day for 5 days a week.  This has been shown to improve insulin resistance.\par \par Patient counseled extensively about the complications of diabetes including but not limited to nephropathy, neuropathy, and retinopathy. We discussed the importance of annual foot and optho exams. Explained that ideally blood sugars in the morning prior to breakfast should be between 80 and 130. Blood sugars should be checked 2 hours after eating and should be <180. If blood sugar is <70, patient should treat the blood sugar FIRST and then contact provider. Advised patient to let us know if BG persistently <70 or >200\par \par -Return to office in 3 to 4 months\par \par #Hyperlipidemia\par -Continue with rosuvastatin 10 mg\par -We will get a lipid panel next visit

## 2023-03-22 NOTE — HISTORY OF PRESENT ILLNESS
[FreeTextEntry1] : #Diabetes Mellitus Type 2  \par \par Diagnosis- as a teenager - started on MFM at the time and stopped one month ago.  Patient mentions that he was a lot heavier at the time of diagnosis.\par Current regimen: Januvia 25mg daily (on since 10 years) + Farxiga 10mg daily (started 2 years ago)\par \par Other diabetic medications discontinued in the past:\par 1. MFM                            Reason for discontinuation: Stopped one month ago due to kidney function. \par 2. Insulin                         Reason: told sugars were okay. started when he had infection in the past\par PCP/prior endocrinologist: PCP. \par Signs/symptoms: Denies \par Last HgbA1c: 8.4% in March 2023.\par Home blood sugars: Sometimes \par Fastings- 170-180- 200s\par Hypoglycemia: Denies \par \par FH of diabetes: mother \par History of CAD: denies \par History of CVA: Denies \par \par Diet: \par Breakfast: skips \par Lunch: chipotle/sandwich \par Dinner: chinese food, sphaghetti and meatballs\par Snacks: chips, dry fruits, cookies, crackers\par Drinks: iced teas\par \par Exercise: \par days/week: 3 days a week\par type of exercise: weights exercise \par \par eGFR: 19    Alb/creat: ++\par Follow with nephrology? yes, Dr. Paniagua \par \par Last eye exam: yesterday \par Evidence of retinopathy? Yes, was told he has retinopathy. had laser surgery \par \par Last foot exam: denies \par Any issues? does have numbness/tingling \par \par Social History: \par Alcohol: on the weekends occasionally \par Tobacco: denies\par Work: IT for [employer] \par \par Surgical history:\par cyst removal in ankle \par \par #Hyperlipidemia \par Currently on statin: Rosuvastain 10 milligrams\par

## 2023-03-28 ENCOUNTER — APPOINTMENT (OUTPATIENT)
Dept: WOUND CARE | Facility: CLINIC | Age: 43
End: 2023-03-28
Payer: COMMERCIAL

## 2023-03-28 DIAGNOSIS — S91.309A UNSPECIFIED OPEN WOUND, UNSPECIFIED FOOT, INITIAL ENCOUNTER: ICD-10-CM

## 2023-03-28 PROCEDURE — 99213 OFFICE O/P EST LOW 20 MIN: CPT

## 2023-03-28 NOTE — HISTORY OF PRESENT ILLNESS
[FreeTextEntry1] : Mr. MARIFER MILLER with htn, diabetes, hld    presents to the office with a wound for 2 months duration. \par  The wound is located on  the right leg.  healing wound also on left leg\par   The patient has complaints of pain.   The patient has been dressing the wound with bacintracin.\par   The patient denies fevers or chills. \par  The patient has localized pain to the wound upon dressing changes. \par  The patient has no other complaints or associated symptoms.  HbA1c is  7.2.\par

## 2023-03-28 NOTE — PHYSICAL EXAM
[Normal Breath Sounds] : Normal breath sounds [Normal Rate and Rhythm] : normal rate and rhythm [2+] : left 2+ [1+] : left 1+ [Ankle Swelling (On Exam)] : present [Ankle Swelling Bilaterally] : bilaterally  [Ankle Swelling On The Right] : mild [Skin Ulcer] : ulcer [Skin Induration] : induration [Alert] : alert [Oriented to Person] : oriented to person [Oriented to Place] : oriented to place [Oriented to Time] : oriented to time [Calm] : calm [Please See PDF for Tissue Analytics] : Please See PDF for Tissue Analytics. [JVD] : no jugular venous distention  [Abdomen Tenderness] : ~T ~M No abdominal tenderness [de-identified] : nad [de-identified] : jason [de-identified] : nl [de-identified] : rom intact [de-identified] : unconcerned about legs [de-identified] : seems unconcerned

## 2023-03-28 NOTE — ASSESSMENT
[FreeTextEntry1] : Wound Assessment and Plan:\par 42 yr old m with bilateral leg ulcers, states s/p mad and friction, venous skin pigmentation\par needs us , had ct no skin abscess\par \par The patient presents with a wound to the bilateral  leg ulcers.   Swelling noted to the extremity.  \par dry flaky skin to periwound\par s/p excisional debridement\par No clinical sign of infection\par Recommendation:\par \par Apply lidocaine or topical anesthetic if needed to reduce pain upon washing the wound.\par Wash wound with ---- Dove skin sensitive soap and clean water \par Apply ---- medical grade honey// alginate/ kami, ace\par Change dressing ---daily.\par Leg elevation as tolerated\par \par Moisturize legs daily\par Supplies ordered\par \par 3/28/23\par Patient is here for follow up of bilateral leg ulcers.\par Bilateral lower extremity edema present. Patient has been using Medi Honey gel for daily dressing and compression. Wounds are improving. Wound bed is red with a layer of yellow slough.\par s/p excisional debridement\par Washed with Vashe, dressed with Medi Honey/Kami/ACE\par RTO in 4 weeks\par Continue Medi Honey/Aquacel/Kami/ACE\par

## 2023-03-28 NOTE — REASON FOR VISIT
[Follow-Up: _____] : a [unfilled] follow-up visit [FreeTextEntry1] : bilateral leg ulcers due to friction burn while hiking

## 2023-03-28 NOTE — PLAN
[FreeTextEntry1] : plan\par medihoney, aquacel, kami, ace\par return in 4 weeks\par \par 3/28/23\par Dexter/Kami Paez ACE\par return in 4 weeks

## 2023-03-29 PROBLEM — S91.309A WOUND OF FOOT: Status: ACTIVE | Noted: 2023-02-07

## 2023-03-31 ENCOUNTER — APPOINTMENT (OUTPATIENT)
Dept: ENDOCRINOLOGY | Facility: CLINIC | Age: 43
End: 2023-03-31

## 2023-04-14 ENCOUNTER — APPOINTMENT (OUTPATIENT)
Dept: ULTRASOUND IMAGING | Facility: CLINIC | Age: 43
End: 2023-04-14
Payer: COMMERCIAL

## 2023-04-14 ENCOUNTER — OUTPATIENT (OUTPATIENT)
Dept: OUTPATIENT SERVICES | Facility: HOSPITAL | Age: 43
LOS: 1 days | End: 2023-04-14
Payer: COMMERCIAL

## 2023-04-14 DIAGNOSIS — I10 ESSENTIAL (PRIMARY) HYPERTENSION: ICD-10-CM

## 2023-04-14 DIAGNOSIS — N18.4 CHRONIC KIDNEY DISEASE, STAGE 4 (SEVERE): ICD-10-CM

## 2023-04-14 PROCEDURE — 93975 VASCULAR STUDY: CPT

## 2023-04-14 PROCEDURE — 93975 VASCULAR STUDY: CPT | Mod: 26

## 2023-04-21 ENCOUNTER — LABORATORY RESULT (OUTPATIENT)
Age: 43
End: 2023-04-21

## 2023-04-28 LAB
25(OH)D3 SERPL-MCNC: 6.2 NG/ML
ALBUMIN SERPL ELPH-MCNC: 3.4 G/DL
ALDOSTERONE SERUM: 11.1 NG/DL
ANA SER IF-ACNC: NEGATIVE
ANION GAP SERPL CALC-SCNC: 15 MMOL/L
BASOPHILS # BLD AUTO: 0.14 K/UL
BASOPHILS NFR BLD AUTO: 1.4 %
BUN SERPL-MCNC: 36 MG/DL
C3 SERPL-MCNC: 149 MG/DL
C4 SERPL-MCNC: 37 MG/DL
CALCIUM SERPL-MCNC: 8.2 MG/DL
CALCIUM SERPL-MCNC: 8.2 MG/DL
CHLORIDE SERPL-SCNC: 102 MMOL/L
CO2 SERPL-SCNC: 18 MMOL/L
CREAT SERPL-MCNC: 3.65 MG/DL
CREAT SPEC-SCNC: 38 MG/DL
DSDNA AB SER-ACNC: <12 IU/ML
EGFR: 20 ML/MIN/1.73M2
EOSINOPHIL # BLD AUTO: 1.96 K/UL
EOSINOPHIL NFR BLD AUTO: 19.4 %
GBM AB TITR SER IF: <0.2
GLUCOSE SERPL-MCNC: 334 MG/DL
HCT VFR BLD CALC: 33.4 %
HCV AB SER QL: NONREACTIVE
HCV S/CO RATIO: 0.11 S/CO
HGB BLD-MCNC: 10.6 G/DL
IMM GRANULOCYTES NFR BLD AUTO: 0.3 %
LYMPHOCYTES # BLD AUTO: 1.4 K/UL
LYMPHOCYTES NFR BLD AUTO: 13.8 %
MAN DIFF?: NORMAL
MCHC RBC-ENTMCNC: 23.3 PG
MCHC RBC-ENTMCNC: 31.7 GM/DL
MCV RBC AUTO: 73.4 FL
METANEPHRINE, PL: 12.2 PG/ML
MICROALBUMIN 24H UR DL<=1MG/L-MCNC: 304.3 MG/DL
MICROALBUMIN/CREAT 24H UR-RTO: 7943 MG/G
MONOCYTES # BLD AUTO: 0.56 K/UL
MONOCYTES NFR BLD AUTO: 5.5 %
NEUTROPHILS # BLD AUTO: 6.02 K/UL
NEUTROPHILS NFR BLD AUTO: 59.6 %
NORMETANEPHRINE, PL: 92 PG/ML
PARATHYROID HORMONE INTACT: 260 PG/ML
PHOSPHATE SERPL-MCNC: 4.6 MG/DL
PHOSPHOLIPASE A2 RECEPTOR ELISA: <1.8 RU/ML
PLATELET # BLD AUTO: 290 K/UL
POTASSIUM SERPL-SCNC: 4.5 MMOL/L
RBC # BLD: 4.55 M/UL
RBC # FLD: 14.8 %
SODIUM SERPL-SCNC: 135 MMOL/L
URATE SERPL-MCNC: 7.3 MG/DL
WBC # FLD AUTO: 10.11 K/UL

## 2023-04-30 LAB — RENIN ACTIVITY, PLASMA: 0.63 NG/ML/HR

## 2023-05-02 ENCOUNTER — APPOINTMENT (OUTPATIENT)
Dept: WOUND CARE | Facility: CLINIC | Age: 43
End: 2023-05-02

## 2023-06-02 ENCOUNTER — RX RENEWAL (OUTPATIENT)
Age: 43
End: 2023-06-02

## 2023-06-05 ENCOUNTER — APPOINTMENT (OUTPATIENT)
Dept: INTERNAL MEDICINE | Facility: CLINIC | Age: 43
End: 2023-06-05
Payer: COMMERCIAL

## 2023-06-05 VITALS
OXYGEN SATURATION: 98 % | HEIGHT: 74 IN | BODY MASS INDEX: 32.73 KG/M2 | WEIGHT: 255 LBS | HEART RATE: 105 BPM | DIASTOLIC BLOOD PRESSURE: 100 MMHG | SYSTOLIC BLOOD PRESSURE: 152 MMHG

## 2023-06-05 LAB — HBA1C MFR BLD HPLC: 10

## 2023-06-05 PROCEDURE — 83036 HEMOGLOBIN GLYCOSYLATED A1C: CPT | Mod: QW

## 2023-06-05 PROCEDURE — 36415 COLL VENOUS BLD VENIPUNCTURE: CPT

## 2023-06-05 PROCEDURE — 99215 OFFICE O/P EST HI 40 MIN: CPT | Mod: 25

## 2023-06-05 RX ORDER — FLASH GLUCOSE SENSOR
KIT MISCELLANEOUS
Qty: 2 | Refills: 5 | Status: ACTIVE | COMMUNITY
Start: 2023-06-05 | End: 1900-01-01

## 2023-06-05 NOTE — PHYSICAL EXAM
[Normal] : soft, non-tender, non-distended, no masses palpated, no HSM and normal bowel sounds [de-identified] : bilateral wounds on shins - wrapped [de-identified] : proprioception impaired, sensation diminished in bilateral feet

## 2023-06-05 NOTE — HISTORY OF PRESENT ILLNESS
[de-identified] : PMHx Type II DM, HLD, BABIN, HTN \par \par Ophtho- Dr. Carty - retinopathy - receiving injections \par Endocrine - Dr. Ruiz \par Nephrology - Dr. Paniagua \par \par Reports compliance with all medications and reduction in carbohydrates , however patient with 15 lb weight gain since last visit. \par Reports that since starting torsemide has not noticed any change in urination - and feels his ankles remain swollen.\par Otherwise feels well  [FreeTextEntry1] : Patient presents today for follow-up of chronic medical conditions.

## 2023-06-05 NOTE — HEALTH RISK ASSESSMENT
[Monthly or less (1 pt)] : Monthly or less (1 point) [1 or 2 (0 pts)] : 1 or 2 (0 points) [Never (0 pts)] : Never (0 points) [No] : In the past 12 months have you used drugs other than those required for medical reasons? No [0] : 2) Feeling down, depressed, or hopeless: Not at all (0) [PHQ-2 Negative - No further assessment needed] : PHQ-2 Negative - No further assessment needed [Good] : ~his/her~  mood as  good [HIV Test offered] : HIV Test offered [Hepatitis C test offered] : Hepatitis C test offered [None] : None [Alone] : lives alone [Employed] : employed [Single] : single [Fully functional (bathing, dressing, toileting, transferring, walking, feeding)] : Fully functional (bathing, dressing, toileting, transferring, walking, feeding) [Fully functional (using the telephone, shopping, preparing meals, housekeeping, doing laundry, using] : Fully functional and needs no help or supervision to perform IADLs (using the telephone, shopping, preparing meals, housekeeping, doing laundry, using transportation, managing medications and managing finances) [Reports changes in vision] : Reports changes in vision [Never] : Never [No falls in past year] : Patient reported no falls in the past year [de-identified] : sedentary  [de-identified] : takeout - unhealthy  [[id number]] : 0 [Change in mental status noted] : No change in mental status noted [Language] : denies difficulty with language [Handling Complex Tasks] : denies difficulty handling complex tasks [Reports changes in hearing] : Reports no changes in hearing [Reports changes in dental health] : Reports no changes in dental health [Smoke Detector] : no smoke detector [Safety elements used in home] : no safety elements used in home [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

## 2023-06-05 NOTE — ASSESSMENT
[FreeTextEntry1] : HCM\par \par *Immunizations - will obtain records \par COVID -19 UTD\par Influenza UTD\par Pneumococcal - \par TDAP - \par \par Preventive medicine discussed - including importance of lifestyle modification - with incorporation of healthy diet + regular exercise\par \par \par #CKD stage 4 \par Follows with nephrology - Dr. Rani Paniagua \par BP and diabetic control stressed to patient \par Torsemide 10 - now with 15 lb weight gain, may need adjustment in diuretic regimen, reports regular urination\par \par #Type II DM uncontrolled \par A1C increased to 10% - suspect in setting of poor diet \par ophtho - UTD - early retinopathy 2/2023 \par current regimen - farxiga, januvia\par given increase in 10% will initiate insulin - lantus 5U qhs \par importance of regular monitoring of blood sugar stressed - will order freestyle сергей \par [ ] dietician referral given\par \par #Wound care \par nonhealing wounds on bilateral shins related to uncontrolled diabetes \par wound care specialist following - now healing better\par CT extremity without evidence of infection\par \par #HTN\par Patient with history of hypertension. \par BP today elevated - did not take medication - but will increase to amlodipine 10 daily, may need adjustment to diuretic regimen\par Patient to continue current regimen as prescribed. Patient instructed to continue monitoring blood pressure at home and to keep a journal. Will continue to monitor patient's bp and adjust his hypertensive regimen as needed.\par \par \par \par \par

## 2023-06-06 LAB
ALBUMIN SERPL ELPH-MCNC: 3.5 G/DL
ANION GAP SERPL CALC-SCNC: 12 MMOL/L
BUN SERPL-MCNC: 43 MG/DL
CALCIUM SERPL-MCNC: 8.2 MG/DL
CHLORIDE SERPL-SCNC: 106 MMOL/L
CHOLEST SERPL-MCNC: 197 MG/DL
CO2 SERPL-SCNC: 20 MMOL/L
CREAT SERPL-MCNC: 4.08 MG/DL
EGFR: 18 ML/MIN/1.73M2
GLUCOSE SERPL-MCNC: 251 MG/DL
HDLC SERPL-MCNC: 39 MG/DL
LDLC SERPL CALC-MCNC: 95 MG/DL
NONHDLC SERPL-MCNC: 158 MG/DL
PHOSPHATE SERPL-MCNC: 5.2 MG/DL
POTASSIUM SERPL-SCNC: 4.7 MMOL/L
SODIUM SERPL-SCNC: 139 MMOL/L
TRIGL SERPL-MCNC: 314 MG/DL

## 2023-06-13 ENCOUNTER — APPOINTMENT (OUTPATIENT)
Dept: NEPHROLOGY | Facility: CLINIC | Age: 43
End: 2023-06-13
Payer: COMMERCIAL

## 2023-06-13 VITALS
DIASTOLIC BLOOD PRESSURE: 94 MMHG | BODY MASS INDEX: 32.08 KG/M2 | TEMPERATURE: 97.6 F | OXYGEN SATURATION: 99 % | HEART RATE: 109 BPM | WEIGHT: 250 LBS | SYSTOLIC BLOOD PRESSURE: 156 MMHG | HEIGHT: 74 IN

## 2023-06-13 VITALS — DIASTOLIC BLOOD PRESSURE: 96 MMHG | SYSTOLIC BLOOD PRESSURE: 136 MMHG

## 2023-06-13 PROCEDURE — 99214 OFFICE O/P EST MOD 30 MIN: CPT

## 2023-06-13 NOTE — REVIEW OF SYSTEMS
[Eyesight Problems] : eyesight problems [Skin Wound] : skin wound [Negative] : Psychiatric [Nosebleeds] : nosebleeds [As noted in HPI] : as noted in HPI [As Noted in HPI] : as noted in HPI [Heart Rate Is Fast] : fast heart rate [FreeTextEntry3] : Follows with ophthalmologist for retinopathy.

## 2023-06-13 NOTE — HISTORY OF PRESENT ILLNESS
[FreeTextEntry1] : MARIFER MILLER is a 43 year male with a history of longstanding diabetes, hypertension here for follow up for CKD stage IV.\par Recently had about 1 month of daily nose bleeds. This has been a problem for many years every spring season and has been cauterized by ENT in distant past. Last around 1/2-1 hr with clots. Denies feeling excessively tired. \par Now following with Dr. Ruiz. Has not made any dietary changes.\par Will be getting a Sylvia and needs to start the long acting insulin. There were coverage delays.\par Did not feel swelling changed much after starting Torsemide. \par Home BPs 120-130s/90s-100s. Sometimes higher. \par Usually takes Amlodipine around 12 noon. Works [employer] 3p-11pm.\par Saw cardiologist about 3 years ago for elevated HR. Dr. Acosta [facility]- echo/stress done no findings. Arrhythmia ruled out at that time.\par \par

## 2023-06-13 NOTE — ASSESSMENT
[FreeTextEntry1] : 42 yo male with DM, HTN, CKD4 and proteinuria\par CKD4 likely in setting of DM, HTN nephropathy. Creatinine trend reviewed with the patient. Discussed with patient that creatinine is progressing and close follow up is needed. \par Will repeat labs today. \par HTN: Goal <130/80. Cont home monitoring. \par Renal sono with dopplers done in April - no SARAY seen. \par Proteinuria: U/A and albumin:creatinine ratio today. Continue Farxiga\par DM: DM control stressed. Pt will be getting long acting insulin from mail order tomorrow. F/u with Dr. Ruiz \par Anemia: will send iron studies, B12 and folate. Will check for insurance prior auth and start JAMES if warranted. \par  Retinopathy: seeing ophtho\par Ulcers: seeing wound care\par Low Na diet\par Denies NSAIDs.\par \par Discussed with pt the progressive nature of his CKD. Advised kidney transplant evaluation and pt in agreement.  \par Followup in 1 month with NP\par All questions were answered.

## 2023-06-13 NOTE — PHYSICAL EXAM
[General Appearance - Alert] : alert [General Appearance - In No Acute Distress] : in no acute distress [Sclera] : the sclera and conjunctiva were normal [Outer Ear] : the ears and nose were normal in appearance [Neck Appearance] : the appearance of the neck was normal [Auscultation Breath Sounds / Voice Sounds] : lungs were clear to auscultation bilaterally [Heart Rate And Rhythm] : heart rate was normal and rhythm regular [Heart Sounds] : normal S1 and S2 [Murmurs] : no murmurs [Heart Sounds Pericardial Friction Rub] : no pericardial rub [Arterial Pulses Carotid] : carotid pulses were normal with no bruits [Bowel Sounds] : normal bowel sounds [Abdomen Soft] : soft [Abdomen Tenderness] : non-tender [No CVA Tenderness] : no ~M costovertebral angle tenderness [Involuntary Movements] : no involuntary movements were seen [] : no rash [No Focal Deficits] : no focal deficits [Oriented To Time, Place, And Person] : oriented to person, place, and time [Affect] : the affect was normal [Mood] : the mood was normal [Respiration, Rhythm And Depth] : normal respiratory rhythm and effort [FreeTextEntry1] : Seen by wound care for ulcers of right and left legs. Improving.

## 2023-06-15 LAB
ALBUMIN SERPL ELPH-MCNC: 3.6 G/DL
ANION GAP SERPL CALC-SCNC: 14 MMOL/L
APPEARANCE: CLEAR
BACTERIA: NEGATIVE /HPF
BILIRUBIN URINE: NEGATIVE
BLOOD URINE: ABNORMAL
BUN SERPL-MCNC: 51 MG/DL
CALCIUM SERPL-MCNC: 8.3 MG/DL
CAST: 2 /LPF
CHLORIDE SERPL-SCNC: 108 MMOL/L
CO2 SERPL-SCNC: 15 MMOL/L
COLOR: YELLOW
CREAT SERPL-MCNC: 4.34 MG/DL
CREAT SPEC-SCNC: 49 MG/DL
DEPRECATED KAPPA LC FREE/LAMBDA SER: 1.45 RATIO
EGFR: 17 ML/MIN/1.73M2
EPITHELIAL CELLS: 1 /HPF
FERRITIN SERPL-MCNC: 560 NG/ML
FOLATE SERPL-MCNC: 10.2 NG/ML
GLUCOSE QUALITATIVE U: >=1000 MG/DL
GLUCOSE SERPL-MCNC: 223 MG/DL
IRON SATN MFR SERPL: 18 %
IRON SERPL-MCNC: 52 UG/DL
KAPPA LC CSF-MCNC: 7.76 MG/DL
KAPPA LC SERPL-MCNC: 11.28 MG/DL
KETONES URINE: NEGATIVE MG/DL
LEUKOCYTE ESTERASE URINE: NEGATIVE
MICROALBUMIN 24H UR DL<=1MG/L-MCNC: 348.6 MG/DL
MICROALBUMIN/CREAT 24H UR-RTO: 7111 MG/G
MICROSCOPIC-UA: NORMAL
NITRITE URINE: NEGATIVE
PH URINE: 6.5
PHOSPHATE SERPL-MCNC: 6 MG/DL
POTASSIUM SERPL-SCNC: 4.5 MMOL/L
PROTEIN URINE: >=1000 MG/DL
RED BLOOD CELLS URINE: 4 /HPF
SODIUM SERPL-SCNC: 137 MMOL/L
SPECIFIC GRAVITY URINE: 1.02
TIBC SERPL-MCNC: 293 UG/DL
UIBC SERPL-MCNC: 241 UG/DL
UROBILINOGEN URINE: 0.2 MG/DL
VIT B12 SERPL-MCNC: 531 PG/ML
WHITE BLOOD CELLS URINE: 1 /HPF

## 2023-06-20 ENCOUNTER — APPOINTMENT (OUTPATIENT)
Dept: NEPHROLOGY | Facility: CLINIC | Age: 43
End: 2023-06-20
Payer: COMMERCIAL

## 2023-06-20 VITALS
HEART RATE: 103 BPM | BODY MASS INDEX: 31.83 KG/M2 | OXYGEN SATURATION: 97 % | DIASTOLIC BLOOD PRESSURE: 80 MMHG | SYSTOLIC BLOOD PRESSURE: 135 MMHG | HEIGHT: 74 IN | WEIGHT: 248.02 LBS | TEMPERATURE: 97.2 F

## 2023-06-20 DIAGNOSIS — E53.8 DEFICIENCY OF OTHER SPECIFIED B GROUP VITAMINS: ICD-10-CM

## 2023-06-20 PROCEDURE — 96372 THER/PROPH/DIAG INJ SC/IM: CPT

## 2023-06-22 RX ORDER — ERYTHROPOIETIN 10000 [IU]/ML
10000 INJECTION, SOLUTION INTRAVENOUS; SUBCUTANEOUS
Qty: 1 | Refills: 0 | Status: COMPLETED | OUTPATIENT
Start: 2023-06-22

## 2023-06-22 RX ADMIN — ERYTHROPOIETIN 1 UNIT/ML: 10000 INJECTION, SOLUTION INTRAVENOUS; SUBCUTANEOUS at 00:00

## 2023-06-22 NOTE — PROCEDURE
[FreeTextEntry1] : Hgb 9.1/ Hct 29.1\par BP acceptable\par Procrit 10,000 units SQ x 1\par Pt tolerated procedure well\par Labs on July 7th at [facility]

## 2023-07-05 ENCOUNTER — APPOINTMENT (OUTPATIENT)
Dept: TRANSPLANT | Facility: CLINIC | Age: 43
End: 2023-07-05
Payer: COMMERCIAL

## 2023-07-05 ENCOUNTER — NON-APPOINTMENT (OUTPATIENT)
Age: 43
End: 2023-07-05

## 2023-07-05 ENCOUNTER — APPOINTMENT (OUTPATIENT)
Dept: NEPHROLOGY | Facility: CLINIC | Age: 43
End: 2023-07-05
Payer: COMMERCIAL

## 2023-07-05 VITALS
OXYGEN SATURATION: 99 % | TEMPERATURE: 97 F | WEIGHT: 247 LBS | SYSTOLIC BLOOD PRESSURE: 154 MMHG | DIASTOLIC BLOOD PRESSURE: 96 MMHG | BODY MASS INDEX: 31.7 KG/M2 | HEART RATE: 98 BPM | HEIGHT: 74 IN | RESPIRATION RATE: 18 BRPM

## 2023-07-05 PROCEDURE — 99205 OFFICE O/P NEW HI 60 MIN: CPT

## 2023-07-05 RX ORDER — INSULIN GLARGINE 100 [IU]/ML
100 INJECTION, SOLUTION SUBCUTANEOUS
Qty: 1 | Refills: 5 | Status: DISCONTINUED | COMMUNITY
Start: 2023-06-05 | End: 2023-07-05

## 2023-07-05 NOTE — PLAN
[FreeTextEntry1] : \par Proceed with completing the pre transplant work up \par - Pre transplant labs will be drawn today  including HLA, ABO, Luminex, serology for active and chronic infection. \par - Cardiology evaluation  - refer for echo, stress and cardiac clearance \par \par - Cancer screening - Check PSA, no need for colonoscopy \par \par - Imaging \par    CXR \par    CT abdomen and pelvis - non contrast. Pt is still preemptive. \par \par \par Once workup is complete, patient will be presented for review by the multidisciplinary listing committee. \par \par

## 2023-07-05 NOTE — ASSESSMENT
[Good candidate] : a good candidate. We should proceed with our protocol for evaluation for kidney transplantation. [FreeTextEntry1] : Mr. MARIFER MILLER is a 43 year old man evaluated for preemptive kidney transplantation. \par He has CKD stage V due to DM. He is still preemptive. No signs of symptoms of uremia. \par Diabetes type II  - sub optimal with A1c of 10%, started on insulin recently - followed by endo\par Diabetes is complicated by retinopathy and neuropathy \par Hypertension - On medications, improving control \par Lower leg blistering wounds - followed by wound care, left leg healed, right leg dry scab , no signs of infection. \par Pedal pulses palpable \par Function status is good \par BMI 31.7

## 2023-07-05 NOTE — END OF VISIT
[Time Spent: ___ minutes] : I have spent [unfilled] minutes of time on the encounter. [] : I have personally discussed the risks and benefits of transplantation and patient attended transplant education class where the following was disclosed: Reviewed factors affecting survival and morbidity while on dialysis, the transplant wait list and reviewed rick-operative and long-term risk factors affecting outcome in kidney transplantation.  One year SRTR outcomes for national and [facility] were discussed in regards to patient survival and graft survival after transplantation.  Details of transplant surgery, including complications were discussed. Immunosuppression and complications including infection, malignancy and new onset diabetes were discussed.  Benefits of live donor transplantation as well as variability in wait times across regions and multiple listing were discussed. KDPI >85% and PHS high risk criteria donors were discussed. HCV kidney transplantation was discussed. Will proceed with completing/ updating work up and listing for transplant/ live donor transplant once work up is reviewed and found to be acceptable by multidisciplinary listing committee.

## 2023-07-05 NOTE — PHYSICAL EXAM
[General Appearance - Alert] : alert [General Appearance - In No Acute Distress] : in no acute distress [General Appearance - Well Nourished] : well nourished [General Appearance - Well Developed] : well developed [Sclera] : the sclera and conjunctiva were normal [PERRL With Normal Accommodation] : pupils were equal in size, round, and reactive to light [Oropharynx] : the oropharynx was normal [Neck Appearance] : the appearance of the neck was normal [Neck Cervical Mass (___cm)] : no neck mass was observed [Jugular Venous Distention Increased] : there was no jugular-venous distention [Respiration, Rhythm And Depth] : normal respiratory rhythm and effort [Exaggerated Use Of Accessory Muscles For Inspiration] : no accessory muscle use [Auscultation Breath Sounds / Voice Sounds] : lungs were clear to auscultation bilaterally [Apical Impulse] : the apical impulse was normal [Heart Rate And Rhythm] : heart rate was normal and rhythm regular [Heart Sounds] : normal S1 and S2 [Heart Sounds Gallop] : no gallops [Murmurs] : no murmurs [Heart Sounds Pericardial Friction Rub] : no pericardial rub [Arterial Pulses Carotid] : carotid pulses were normal with no bruits [Full Pulse] : the pedal pulses are present [Bowel Sounds] : normal bowel sounds [Abdomen Soft] : soft [Abdomen Tenderness] : non-tender [Abdomen Mass (___ Cm)] : no abdominal mass palpated [Abdomen Hernia] : no hernia was discovered [Involuntary Movements] : no involuntary movements were seen [] : right dorsalis pedis palpable [No Focal Deficits] : no focal deficits [Oriented To Time, Place, And Person] : oriented to person, place, and time [FreeTextEntry1] : Bilateral lower leg hypopigmented scab right > left

## 2023-07-05 NOTE — HISTORY OF PRESENT ILLNESS
[TextBox_42] : \par Mr. MARIFER MILLER is a 43 year old man with CKD stage V due to DM. Followed by Dr. Paniagua for the past 6 months. \par He has known CKD since 6 months ago.  No kidney biopsy. \par \par Diabetes was initially diagnosed > 25 years ago. Started on insulin 1 month ago. \par Has diabetic retinopathy s/p laser surgery, and bilateral LE neuropathy. \par H/o bilateral blistering lower leg wounds followed by wound care and improving\par H/o left foot ulcer 5 years ago, none currently \par Hypertension was diagnosed 2-3 years ago, on medications. \par Hyperlipidemia \par Anemia on Procrit \par Latent TB treated 30 years ago \par \par PSH: Laser procedure in both eyes, ankle cyst removal 15 yrs ago, facial abscess drainage 8 years ago. \par \par No history of coronary artery disease.\par No known history of peripheral vascular disease, no claudications \par No history of bleeding problems \par No history of DVT or PE \par No history of Stroke, no Seizures\par No history of lung problems including COPD, Asthma, Sleep apnea, pneumonia, bronchitis. \par No history of kidney stones, voiding problems, urological problems, recurrent urinary tract infections, hematuria. \par No history of cancer. \par No history of viral infections such as hepatitis or HIV. \par No sensitizing events, no blood transfusions \par \par \par Hospitalizations :- 8 years ago for facial infection. \par \par Exercise tolerance  - Exercises regularly on elliptical and weights 3 days a week. No limitations. \par \par Potential live donors - none at this time. 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 \par Single, lives alone. Drives. Independent with ADL and iADL. \par Never smoked, social alcohol intake, no drug use, rare marijuana \par \par No herbal supplements, Over the counter medications\par

## 2023-07-05 NOTE — REVIEW OF SYSTEMS
[Wears glasses] : wears glasses [Anemia] : anemia [Fever] : no fever [Fatigue] : no fatigue [Night Sweats] : no night sweats [Recent Weight Gain (___ Lbs)] : no recent weight gain [Recent Weight Loss (___ Lbs)] : no recent weight loss [Sore throat] : no sore throat [Sclera anicteric] : sclera icteric [Double vision] : no double vision [Blurred Vision] : no blurred vision [Chest Pain] : no chest pain [Palpitations] : no palpitations [SOB] : no shortness of breath [Wheezing] : no wheezing [Cough] : no cough [Dyspnea on Exertion] : no dyspnea on exertion [Pleuritic Chest Pain] : no pleuritic chest pain [Abdominal Pain] : no abdominal pain [Nausea] : no nausea [Constipation] : no constipation [Diarrhea] : diarrhea [Vomiting] : no vomiting [Dysuria] : no dysuria [Frequency] : no frequency [Hematuria] : no hematuria [Joint Pain] : no joint pain [Joint Stiffness] : no joint stiffness [Tattoos] : no tattoos [Itching] : no itching [Skin Rash] : no skin rash [Headache] : no headache [Dizziness] : no dizziness [Fainting] : no fainting [Confusion] : no confusion [Memory Loss] : no memory loss [Unsteady Gait] : steady gait [Seizures] : no seizures [Suicidal] : not suicidal [Hallucinations] : no hallucinations [Anxiety] : no anxiety [Depression] : no depression [Adenopathy] : no adenopathy

## 2023-07-06 NOTE — HISTORY OF PRESENT ILLNESS
[Diabetes Mellitus] : Diabetes Mellitus [Previous Kidney Transplant] : no previous kidney transplant [Cardiac History] : no cardiac history [TextBox_16] : pre emptive [TextBox_42] : Mr. MARIFER MILLER is a 43 year old man with CKD stage V due to DM. Followed by Dr. Paniagua for the past 6 months. \par He has known CKD since 6 months ago. No kidney biopsy. \par not on dialysis yet\par \par Diabetes was initially diagnosed > 25 years ago. Started on insulin 1 month ago. \par Has diabetic retinopathy s/p laser surgery, and bilateral LE neuropathy. \par H/o bilateral blistering lower leg wounds (above the ankle) following a hiking trip;  followed by wound care and improving\par H/o left foot ulcer 5 years ago, none currently \par Hypertension was diagnosed 2-3 years ago, on medications. \par Hyperlipidemia \par Anemia on Procrit \par Latent TB treated 30 years ago \par \par no previous surgery other than eye surgery\par he exercises regularly\par non smoker\par works in IT\par single and has no children\par mother is diabetic\par \par

## 2023-07-06 NOTE — PHYSICAL EXAM
[] : right dorsalis pedis palpable [Normal] : normal [TextBox_34] : mild edema. healing superficial wounds above ankle bilaterally

## 2023-07-07 ENCOUNTER — APPOINTMENT (OUTPATIENT)
Dept: INTERNAL MEDICINE | Facility: CLINIC | Age: 43
End: 2023-07-07
Payer: COMMERCIAL

## 2023-07-07 ENCOUNTER — NON-APPOINTMENT (OUTPATIENT)
Age: 43
End: 2023-07-07

## 2023-07-07 VITALS
DIASTOLIC BLOOD PRESSURE: 76 MMHG | WEIGHT: 256 LBS | HEIGHT: 74 IN | OXYGEN SATURATION: 99 % | HEART RATE: 102 BPM | BODY MASS INDEX: 32.85 KG/M2 | SYSTOLIC BLOOD PRESSURE: 144 MMHG | TEMPERATURE: 98.3 F

## 2023-07-07 LAB
ABO + RH PNL BLD: NORMAL
ABO + RH PNL BLD: NORMAL
ALBUMIN SERPL ELPH-MCNC: 3.7 G/DL
ALP BLD-CCNC: 67 U/L
ALT SERPL-CCNC: 20 U/L
ANION GAP SERPL CALC-SCNC: 13 MMOL/L
APPEARANCE: CLEAR
AST SERPL-CCNC: 20 U/L
BACTERIA: NEGATIVE /HPF
BILIRUB SERPL-MCNC: 0.2 MG/DL
BILIRUBIN URINE: NEGATIVE
BLOOD URINE: ABNORMAL
BUN SERPL-MCNC: 48 MG/DL
C PEPTIDE SERPL-MCNC: 8.7 NG/ML
CALCIUM SERPL-MCNC: 8.9 MG/DL
CAST: 0 /LPF
CHLORIDE SERPL-SCNC: 105 MMOL/L
CHOLEST SERPL-MCNC: 167 MG/DL
CMV IGG SERPL QL: 0.28 U/ML
CMV IGG SERPL-IMP: NEGATIVE
CO2 SERPL-SCNC: 20 MMOL/L
COLOR: YELLOW
COVID-19 SPIKE DOMAIN ANTIBODY INTERPRETATION: POSITIVE
CREAT SERPL-MCNC: 4.34 MG/DL
CREAT SPEC-SCNC: 49 MG/DL
CREAT/PROT UR: 11.3 RATIO
EBV EA AB SER IA-ACNC: <5 U/ML
EBV EA AB TITR SER IF: POSITIVE
EBV EA IGG SER QL IA: 151 U/ML
EBV EA IGG SER-ACNC: NEGATIVE
EBV EA IGM SER IA-ACNC: NEGATIVE
EBV PATRN SPEC IB-IMP: NORMAL
EBV VCA IGG SER IA-ACNC: 167 U/ML
EBV VCA IGM SER QL IA: <10 U/ML
EGFR: 17 ML/MIN/1.73M2
EPITHELIAL CELLS: 0 /HPF
EPSTEIN-BARR VIRUS CAPSID ANTIGEN IGG: POSITIVE
ESTIMATED AVERAGE GLUCOSE: 192 MG/DL
GLUCOSE QUALITATIVE U: 500 MG/DL
GLUCOSE SERPL-MCNC: 139 MG/DL
HAV IGM SER QL: NONREACTIVE
HBA1C MFR BLD HPLC: 8.3 %
HBV CORE IGG+IGM SER QL: NONREACTIVE
HBV SURFACE AB SER QL: REACTIVE
HBV SURFACE AB SERPL IA-ACNC: 153.2 MIU/ML
HBV SURFACE AG SER QL: NONREACTIVE
HCV AB SER QL: NONREACTIVE
HCV S/CO RATIO: 0.08 S/CO
HDLC SERPL-MCNC: 33 MG/DL
HIV1+2 AB SPEC QL IA.RAPID: NONREACTIVE
HSV 1+2 IGG SER IA-IMP: NEGATIVE
HSV 1+2 IGG SER IA-IMP: POSITIVE
HSV1 IGG SER QL: 14 INDEX
HSV2 IGG SER QL: 0.12 INDEX
KETONES URINE: NEGATIVE MG/DL
LDLC SERPL CALC-MCNC: 74 MG/DL
LEUKOCYTE ESTERASE URINE: NEGATIVE
M TB IFN-G BLD-IMP: NEGATIVE
MAGNESIUM SERPL-MCNC: 1.8 MG/DL
MICROSCOPIC-UA: NORMAL
NITRITE URINE: NEGATIVE
NONHDLC SERPL-MCNC: 134 MG/DL
PH URINE: 6.5
PHOSPHATE SERPL-MCNC: 5.6 MG/DL
POTASSIUM SERPL-SCNC: 4.8 MMOL/L
PROT SERPL-MCNC: 6.9 G/DL
PROT UR-MCNC: 558 MG/DL
PROTEIN URINE: 300 MG/DL
PSA SERPL-MCNC: 0.51 NG/ML
QUANTIFERON TB PLUS MITOGEN MINUS NIL: 3.27 IU/ML
QUANTIFERON TB PLUS NIL: 0.01 IU/ML
QUANTIFERON TB PLUS TB1 MINUS NIL: 0.04 IU/ML
QUANTIFERON TB PLUS TB2 MINUS NIL: 0.31 IU/ML
RED BLOOD CELLS URINE: 3 /HPF
ROGOSIN: NORMAL
RUBV IGG FLD-ACNC: 1.3 INDEX
RUBV IGG SER-IMP: POSITIVE
SARS-COV-2 AB SERPL IA-ACNC: >250 U/ML
SODIUM SERPL-SCNC: 138 MMOL/L
SPECIFIC GRAVITY URINE: 1.01
T GONDII AB SER-IMP: NEGATIVE
T GONDII IGG SER QL: <3 IU/ML
T PALLIDUM AB SER QL IA: NEGATIVE
TRIGL SERPL-MCNC: 300 MG/DL
URATE SERPL-MCNC: 9 MG/DL
UROBILINOGEN URINE: 0.2 MG/DL
VZV AB TITR SER: POSITIVE
VZV IGG SER IF-ACNC: >4000 INDEX
WHITE BLOOD CELLS URINE: 1 /HPF

## 2023-07-07 PROCEDURE — 99214 OFFICE O/P EST MOD 30 MIN: CPT

## 2023-07-07 RX ORDER — PEN NEEDLE, DIABETIC 29 G X1/2"
32G X 4 MM NEEDLE, DISPOSABLE MISCELLANEOUS
Qty: 120 | Refills: 3 | Status: ACTIVE | COMMUNITY
Start: 2017-02-03 | End: 1900-01-01

## 2023-07-07 NOTE — HISTORY OF PRESENT ILLNESS
[FreeTextEntry1] : Patient presents today for follow-up of chronic medical conditions.  [de-identified] : PMHx Type II DM, HLD, BABIN, HTN \par \par Ophtho- Dr. Carty - retinopathy - receiving injections \par Endocrine - Dr. Ruiz \par Nephrology - Dr. Paniagua \par \par Transplant eval ongoing\par Reports that since starting torsemide has not noticed any change in urination - and feels his ankles remain swollen.\par Has 10-15 lb weight fluctuations - will need increase in diuretic. \par Additionally patient compliant with insulin since last visit with interval decrease in A1C 10 > 8%\par Denies hypoglycemic episodes \par \par Otherwise feels well

## 2023-07-07 NOTE — ASSESSMENT
[FreeTextEntry1] : HCM\par \par *Immunizations - will obtain records \par COVID -19 UTD\par Influenza UTD\par Pneumococcal - \par TDAP - \par \par Preventive medicine discussed - including importance of lifestyle modification - with incorporation of healthy diet + regular exercise\par \par \par #CKD stage 4 \par Follows with nephrology - Dr. Rani Paniagua \par BP and diabetic control stressed to patient \par Torsemide 10 - now with 15 lb weight gain, may need adjustment in diuretic regimen, reports regular urination - will discuss increase in torsemide with nephrology at upcoming appt \par transplant eval ongoing \par \par #Type II DM uncontrolled \par A1C increased to 10% - suspect in setting of poor diet > repeat 8% \par ophtho - UTD - early retinopathy 2/2023 \par current regimen - farxiga, januvia, lantus 7U\par given reduction 10 > 8 % in 1 month will increase slightly to lantus 7U qhs until upcoming appt with endocrine \par importance of regular monitoring of blood sugar stressed - will order freestyle сергей \par [ ] dietician referral given\par \par #Wound care \par nonhealing wounds on bilateral shins related to uncontrolled diabetes \par wound care specialist following - now healing better\par CT extremity without evidence of infection\par \par #HTN\par Patient with history of hypertension. \par BP today elevated - did not take medication - but will increase to amlodipine 10 daily, may need adjustment to diuretic regimen (will discuss increase in torsemide as above)\par Patient to continue current regimen as prescribed. Patient instructed to continue monitoring blood pressure at home and to keep a journal. Will continue to monitor patient's bp and adjust his hypertensive regimen as needed.\par \par \par \par \par

## 2023-07-07 NOTE — HEALTH RISK ASSESSMENT
[Monthly or less (1 pt)] : Monthly or less (1 point) [1 or 2 (0 pts)] : 1 or 2 (0 points) [Never (0 pts)] : Never (0 points) [No] : In the past 12 months have you used drugs other than those required for medical reasons? No [No falls in past year] : Patient reported no falls in the past year [0] : 2) Feeling down, depressed, or hopeless: Not at all (0) [PHQ-2 Negative - No further assessment needed] : PHQ-2 Negative - No further assessment needed [de-identified] : sedentary  [de-identified] : takeout - unhealthy  [[id number]] : 0 [Never] : Never [Good] : ~his/her~  mood as  good [HIV Test offered] : HIV Test offered [Hepatitis C test offered] : Hepatitis C test offered [Change in mental status noted] : No change in mental status noted [Language] : denies difficulty with language [Handling Complex Tasks] : denies difficulty handling complex tasks [None] : None [Alone] : lives alone [Employed] : employed [Single] : single [Fully functional (bathing, dressing, toileting, transferring, walking, feeding)] : Fully functional (bathing, dressing, toileting, transferring, walking, feeding) [Fully functional (using the telephone, shopping, preparing meals, housekeeping, doing laundry, using] : Fully functional and needs no help or supervision to perform IADLs (using the telephone, shopping, preparing meals, housekeeping, doing laundry, using transportation, managing medications and managing finances) [Reports changes in hearing] : Reports no changes in hearing [Reports changes in vision] : Reports changes in vision [Reports changes in dental health] : Reports no changes in dental health [Smoke Detector] : no smoke detector [Safety elements used in home] : no safety elements used in home [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

## 2023-07-07 NOTE — PHYSICAL EXAM
[Normal] : soft, non-tender, non-distended, no masses palpated, no HSM and normal bowel sounds [de-identified] : bilateral wounds on shins - wrapped [de-identified] : proprioception impaired, sensation diminished in bilateral feet

## 2023-07-10 LAB
EBV DNA SERPL NAA+PROBE-ACNC: NOT DETECTED IU/ML
EBVPCR LOG: NOT DETECTED LOG10IU/ML

## 2023-07-12 ENCOUNTER — APPOINTMENT (OUTPATIENT)
Dept: NEPHROLOGY | Facility: CLINIC | Age: 43
End: 2023-07-12
Payer: COMMERCIAL

## 2023-07-12 VITALS — SYSTOLIC BLOOD PRESSURE: 122 MMHG | DIASTOLIC BLOOD PRESSURE: 86 MMHG

## 2023-07-12 VITALS
TEMPERATURE: 97.6 F | WEIGHT: 255.73 LBS | HEART RATE: 110 BPM | SYSTOLIC BLOOD PRESSURE: 136 MMHG | OXYGEN SATURATION: 97 % | BODY MASS INDEX: 32.82 KG/M2 | HEIGHT: 74 IN | DIASTOLIC BLOOD PRESSURE: 83 MMHG

## 2023-07-12 PROCEDURE — 99214 OFFICE O/P EST MOD 30 MIN: CPT | Mod: 25

## 2023-07-12 PROCEDURE — 96372 THER/PROPH/DIAG INJ SC/IM: CPT

## 2023-07-12 RX ORDER — ERYTHROPOIETIN 10000 [IU]/ML
10000 INJECTION, SOLUTION INTRAVENOUS; SUBCUTANEOUS
Qty: 1 | Refills: 0 | Status: COMPLETED | OUTPATIENT
Start: 2023-07-12

## 2023-07-12 RX ADMIN — ERYTHROPOIETIN 1 UNIT/ML: 10000 INJECTION, SOLUTION INTRAVENOUS; SUBCUTANEOUS at 00:00

## 2023-07-12 NOTE — HISTORY OF PRESENT ILLNESS
[FreeTextEntry1] : MARIFER MILLER is a 43 year male with a history of longstanding diabetes, hypertension here for follow up for CKD stage IV.\par Patient was seen at [facility] and has started work-up. He lives alone. He has a healthy brother and 67yo father but they do not know about his CKD at this time. Full labs from transplant evaluation available. \par Received his first Procrit on June 20th. No c/o significant fatigue. Sometimes feels more cold than others. \par Home BPs 120-130s/80s. HR high. Had workup about 3 years ago and no findings at that time. \par Usually takes Amlodipine around 12 noon. Works [employer] in [place] 3p-11pm.\par Will be seeing Transplant cardiology for initial appt. \par Had education session with BLANQUITA Garces nurse today and discussed diet and modalities.\par Started phoslo and sodium bicarb. Dr. Fish started Semglee with better BSs reported. \par Today pt c/o BLLE edema. Does not feel swelling improved after starting Torsemide.\par No n/v. No decrease in appetite, no tremors. No foamy urine. no hematuria, no dysuria. no confusion. No SOB, MILLER. No wt loss.

## 2023-07-12 NOTE — ASSESSMENT
[FreeTextEntry1] : 42 yo male with DM, HTN, CKD4 and proteinuria\par CKD4 likely in setting of DM, HTN nephropathy. Creatinine trend reviewed with the patient. Discussed with patient that creatinine is progressing and close follow up is needed. \par HTN: At goal <130/80. Cont home monitoring. Renal sono with dopplers done in April - no SARAY seen.\par Edema: Advised pt increase Torsemide to 20mg x 3 days. If improvement in swelling noted continue Torsemide 20mg on MWF and 10mg all other days. Any questions or concerns call the office. Low Na diet stressed. \par Proteinuria: Continue Farxiga\par DM: DM control stressed. Continue f/u as recommended. \par Anemia: Procrit 10,000 units SQ x 1 today. \par Retinopathy: seeing ophtho\par Ulcers: seeing wound care\par Low Na diet\par Denies NSAIDs.\par \par Discussed with pt the progressive nature of his CKD. Discussed PD vs HD. Pt will consider both at this time and continue f/u with Mili. \par Labs in early August. If Hgb< 10 will schedule for Procrit. \par Followup in 3 months with Dr. Paniagua. \par All questions were answered.

## 2023-07-12 NOTE — REVIEW OF SYSTEMS
[Eyesight Problems] : eyesight problems [As noted in HPI] : as noted in HPI [Heart Rate Is Fast] : fast heart rate [As Noted in HPI] : as noted in HPI [Lower Ext Edema] : lower extremity edema [Negative] : Heme/Lymph [FreeTextEntry3] : Follows with ophthalmologist for retinopathy.

## 2023-07-13 ENCOUNTER — APPOINTMENT (OUTPATIENT)
Dept: WOUND CARE | Facility: CLINIC | Age: 43
End: 2023-07-13
Payer: COMMERCIAL

## 2023-07-13 ENCOUNTER — OUTPATIENT (OUTPATIENT)
Dept: OUTPATIENT SERVICES | Facility: HOSPITAL | Age: 43
LOS: 1 days | End: 2023-07-13
Payer: COMMERCIAL

## 2023-07-13 VITALS — TEMPERATURE: 97.2 F

## 2023-07-13 PROCEDURE — 99213 OFFICE O/P EST LOW 20 MIN: CPT

## 2023-07-13 PROCEDURE — G0463: CPT

## 2023-07-14 ENCOUNTER — APPOINTMENT (OUTPATIENT)
Dept: CARDIOLOGY | Facility: CLINIC | Age: 43
End: 2023-07-14
Payer: COMMERCIAL

## 2023-07-14 ENCOUNTER — NON-APPOINTMENT (OUTPATIENT)
Age: 43
End: 2023-07-14

## 2023-07-14 VITALS
OXYGEN SATURATION: 99 % | SYSTOLIC BLOOD PRESSURE: 140 MMHG | WEIGHT: 252 LBS | DIASTOLIC BLOOD PRESSURE: 98 MMHG | HEART RATE: 105 BPM | BODY MASS INDEX: 32.36 KG/M2

## 2023-07-14 PROCEDURE — 99205 OFFICE O/P NEW HI 60 MIN: CPT

## 2023-07-14 PROCEDURE — 93000 ELECTROCARDIOGRAM COMPLETE: CPT | Mod: NC

## 2023-07-14 NOTE — HISTORY OF PRESENT ILLNESS
[FreeTextEntry1] : Mr. Zach Ramos is a 43 year old man presenting for cardiovascular evaluation as preemptive candidate for kidney transplant. He has diabetes, hypertension, but no known cardiac disease. He has never been a cigarette smoker. He describes a good functional capacity, working, can walk about a mile and climb 2 flights of stairs.

## 2023-07-14 NOTE — REVIEW OF SYSTEMS
[Blurry Vision] : blurred vision [Lower Ext Edema] : lower extremity edema [Negative] : Heme/Lymph [FreeTextEntry3] : vision imporved

## 2023-07-14 NOTE — DISCUSSION/SUMMARY
[Patient] : the patient [EKG obtained to assist in diagnosis and management of assessed problem(s)] : EKG obtained to assist in diagnosis and management of assessed problem(s) [Responding to Treatment] : responding to treatment [de-identified] : suboptimal [FreeTextEntry1] : 43 year old man with end stage kidney disease attributed to diabetes, not currently on dialysis, but anticipating transplant. Has no cardiac symptoms, no findings on exam and arranging echocardiogram and treadmill exercise stress test with radionuclide perfusion imaging.

## 2023-07-14 NOTE — PHYSICAL EXAM
[Not Palpable] : not palpable [Tachycardia] : tachycardic [Rhythm Regular] : regular [Normal S1] : normal S1 [Normal S2] : normal S2 [No Murmur] : no murmurs heard [No Pitting Edema] : no pitting edema present [2+] : left 2+ [No Abnormalities] : the abdominal aorta was not enlarged and no bruit was heard [Normal] : alert and oriented, normal memory [Right Carotid Bruit] : no bruit heard over the right carotid [Left Carotid Bruit] : no bruit heard over the left carotid

## 2023-07-20 NOTE — PHYSICAL EXAM
[Normal Breath Sounds] : Normal breath sounds [Normal Rate and Rhythm] : normal rate and rhythm [2+] : left 2+ [1+] : left 1+ [Ankle Swelling (On Exam)] : present [Ankle Swelling Bilaterally] : bilaterally  [Ankle Swelling On The Right] : mild [Skin Ulcer] : ulcer [Skin Induration] : induration [Alert] : alert [Oriented to Person] : oriented to person [Oriented to Place] : oriented to place [Oriented to Time] : oriented to time [Calm] : calm [Please See PDF for Tissue Analytics] : Please See PDF for Tissue Analytics. [JVD] : no jugular venous distention  [Abdomen Tenderness] : ~T ~M No abdominal tenderness [de-identified] : nad [de-identified] : jason [de-identified] : nl [de-identified] : rom intact [de-identified] : unconcerned about legs [de-identified] : seems unconcerned

## 2023-07-20 NOTE — ASSESSMENT
[FreeTextEntry1] : Wound Assessment and Plan:\par 43 yr old m with bilateral leg ulcers, states s/p mad and friction, venous skin pigmentation\par needs us , had ct no skin abscess\par now all closed\par discussed skin care sun block bug spray\par fup prn\par 7/13/2023\par Wounds closed\par Right Ankle- 28.5 cm\par Left Ankle- 29 cm\par Compression garments and moisturizer recommended for swelling

## 2023-07-20 NOTE — HISTORY OF PRESENT ILLNESS
[FreeTextEntry1] : Mr. MARIFER MILLER with htn, diabetes, hld    presents to the office with a wound for 2 months duration. \par  The wound is located on  the right leg.  healing wound also on left leg\par   The patient has complaints of pain.   The patient has been dressing the wound with bacintracin.\par   The patient denies fevers or chills. \par  The patient has localized pain to the wound upon dressing changes. \par  The patient has no other complaints or associated symptoms.  HbA1c is  7.2.\par \par The patient presents with a wound to the bilateral  leg ulcers.   Swelling noted to the extremity.  \par dry flaky skin to periwounds/p excisional debridementNo clinical sign of infection\par Apply lidocaine or topical anesthetic if needed to reduce pain upon washing the wound.\par Wash wound with ---- Dove skin sensitive soap and clean water Apply ---- medical grade honey// alginate/ kami, aceChange dressing ---daily.Leg elevation as tolerated\par Moisturize legs dailySupplies ordered\par 3/28/23\par Patient is here for follow up of bilateral leg ulcers. first visit 2/28\par Bilateral lower extremity edema present. Patient has been using Medi Honey gel for daily dressing and compression. Wounds are improving. Wound bed is red with a layer of yellow slough.\par s/p excisional debridement\par Washed with Vashe, dressed with Medi Honey/Kami/ACE\par RTO in 4 weeks\par Continue Medi Honey/Aquacel/Kami/ACE\par \par

## 2023-07-24 DIAGNOSIS — E11.49 TYPE 2 DIABETES MELLITUS WITH OTHER DIABETIC NEUROLOGICAL COMPLICATION: ICD-10-CM

## 2023-07-24 DIAGNOSIS — L97.919 NON-PRESSURE CHRONIC ULCER OF UNSPECIFIED PART OF RIGHT LOWER LEG WITH UNSPECIFIED SEVERITY: ICD-10-CM

## 2023-07-24 DIAGNOSIS — E11.40 TYPE 2 DIABETES MELLITUS WITH DIABETIC NEUROPATHY, UNSPECIFIED: ICD-10-CM

## 2023-07-26 ENCOUNTER — APPOINTMENT (OUTPATIENT)
Dept: ENDOCRINOLOGY | Facility: CLINIC | Age: 43
End: 2023-07-26
Payer: COMMERCIAL

## 2023-07-26 VITALS
HEIGHT: 74 IN | WEIGHT: 252 LBS | HEART RATE: 106 BPM | BODY MASS INDEX: 32.34 KG/M2 | OXYGEN SATURATION: 98 % | DIASTOLIC BLOOD PRESSURE: 92 MMHG | SYSTOLIC BLOOD PRESSURE: 148 MMHG

## 2023-07-26 PROCEDURE — 95251 CONT GLUC MNTR ANALYSIS I&R: CPT

## 2023-07-26 PROCEDURE — 99215 OFFICE O/P EST HI 40 MIN: CPT | Mod: 25

## 2023-07-26 NOTE — ASSESSMENT
[FreeTextEntry1] : #Type 2 diabetes\par -Patient with longstanding history of type 2 diabetes since he was a teenager.  Mentions that he was initially started on metformin however it was discontinued 1 month ago due to his kidney dysfunction.\par -Since last visit, his hemoglobin A1c did go up to 10% in June 2023 at which point he was started on low-dose basal insulin.  His hemoglobin A1c in just 1 month did come down to 8.3%.  A1c not as reliable at this time as it has only been a few weeks since his last check.  Anticipate improvement given blood sugar trends at this time.\par -I reviewed and analyzed patient's freestyle сергей data.  His time in range is now 84%, running high 13%, very high 3%, low 0%.  His average blood glucose is 143.  Average blood glucose throughout the day is 128 at 9 AM, 156 at 3 PM, 159 at 6 PM, 164 at 12 AM.  Patient is spiking mostly after lunch and dinner which are his biggest meals.  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  Continue with low-dose basal insulin, Januvia and Farxiga\par -Continue to monitor sugars closely at home using his freestyle сергей.  We discussed blood sugar goals\par -Patient's diet is still carb heavy, recommend to balance out his diet and avoid high carb meals.\par \par #Hyperlipidemia\par -Continue with rosuvastatin 10 mg\par \par

## 2023-07-26 NOTE — HISTORY OF PRESENT ILLNESS
[FreeTextEntry1] : #Diabetes Mellitus Type 2  \par \par Diagnosis- as a teenager - started on MFM at the time and stopped one month ago.  Patient mentions that he was a lot heavier at the time of diagnosis.\par Current regimen: Januvia 25mg daily (on since 10 years) + Farxiga 10mg daily (started 2 years ago) + lantus 7 units daily (started on month ago) \par \par Other diabetic medications discontinued in the past:\par 1. MFM                            Reason for discontinuation: Stopped  due to kidney function. \par \par PCP/prior endocrinologist: PCP. \par Signs/symptoms: Denies \par Last HgbA1c: 8.4% in March 2023-->10.0% in June 2023-->8.3% in July 2023\par Home blood sugars: Sometimes \par Fastings- 170-180- 200s\par Hypoglycemia: Denies \par \par tir 84%\par HIGH 13%\par VERY HIGH 3%\par LOW 0%\par \par 9A- 128\par 3P- 156\par 6P- 159\par 12A- 164\par spiking after lunch/dinner\par \par FH of diabetes: mother \par History of CAD: denies \par History of CVA: Denies \par \par Diet: \par Stopped drinking iced teas. \par trying to eat healthier \par \par \par eGFR: 19    Alb/creat: ++\par Follow with nephrology? yes, Dr. Paniagua \par \par Last eye exam: yesterday \par Evidence of retinopathy? Yes, was told he has retinopathy. had laser surgery \par \par Last foot exam: denies \par Any issues? does have numbness/tingling \par \par Social History: \par Alcohol: on the weekends occasionally \par Tobacco: denies\par Work: IT for [employer] \par \par Surgical history:\par cyst removal in ankle \par \par #Hyperlipidemia \par Currently on statin: Rosuvastain 10mg \par LDL 74\par

## 2023-07-31 ENCOUNTER — APPOINTMENT (OUTPATIENT)
Dept: CT IMAGING | Facility: IMAGING CENTER | Age: 43
End: 2023-07-31
Payer: COMMERCIAL

## 2023-07-31 ENCOUNTER — OUTPATIENT (OUTPATIENT)
Dept: OUTPATIENT SERVICES | Facility: HOSPITAL | Age: 43
LOS: 1 days | End: 2023-07-31
Payer: COMMERCIAL

## 2023-07-31 ENCOUNTER — APPOINTMENT (OUTPATIENT)
Dept: RADIOLOGY | Facility: IMAGING CENTER | Age: 43
End: 2023-07-31
Payer: COMMERCIAL

## 2023-07-31 DIAGNOSIS — Z01.818 ENCOUNTER FOR OTHER PREPROCEDURAL EXAMINATION: ICD-10-CM

## 2023-07-31 PROCEDURE — 71046 X-RAY EXAM CHEST 2 VIEWS: CPT | Mod: 26

## 2023-07-31 PROCEDURE — 74176 CT ABD & PELVIS W/O CONTRAST: CPT | Mod: 26

## 2023-07-31 PROCEDURE — 74176 CT ABD & PELVIS W/O CONTRAST: CPT

## 2023-07-31 PROCEDURE — 71046 X-RAY EXAM CHEST 2 VIEWS: CPT

## 2023-08-23 ENCOUNTER — RX RENEWAL (OUTPATIENT)
Age: 43
End: 2023-08-23

## 2023-08-23 RX ORDER — SITAGLIPTIN 25 MG/1
25 TABLET, FILM COATED ORAL
Qty: 90 | Refills: 3 | Status: ACTIVE | COMMUNITY
Start: 2023-03-10 | End: 1900-01-01

## 2023-08-28 ENCOUNTER — NON-APPOINTMENT (OUTPATIENT)
Age: 43
End: 2023-08-28

## 2023-08-28 LAB
ALBUMIN SERPL ELPH-MCNC: 3.5 G/DL
ANION GAP SERPL CALC-SCNC: 14 MMOL/L
BUN SERPL-MCNC: 62 MG/DL
CALCIUM SERPL-MCNC: 7.5 MG/DL
CHLORIDE SERPL-SCNC: 110 MMOL/L
CO2 SERPL-SCNC: 19 MMOL/L
CREAT SERPL-MCNC: 5.6 MG/DL
EGFR: 12 ML/MIN/1.73M2
GLUCOSE SERPL-MCNC: 150 MG/DL
PHOSPHATE SERPL-MCNC: 5.1 MG/DL
POTASSIUM SERPL-SCNC: 5 MMOL/L
SODIUM SERPL-SCNC: 144 MMOL/L
URATE SERPL-MCNC: 8.9 MG/DL

## 2023-08-30 ENCOUNTER — APPOINTMENT (OUTPATIENT)
Dept: NEPHROLOGY | Facility: CLINIC | Age: 43
End: 2023-08-30
Payer: COMMERCIAL

## 2023-08-30 VITALS
HEIGHT: 74 IN | OXYGEN SATURATION: 95 % | HEART RATE: 105 BPM | TEMPERATURE: 97.8 F | DIASTOLIC BLOOD PRESSURE: 90 MMHG | BODY MASS INDEX: 34.01 KG/M2 | SYSTOLIC BLOOD PRESSURE: 153 MMHG | WEIGHT: 265 LBS

## 2023-08-30 VITALS — SYSTOLIC BLOOD PRESSURE: 164 MMHG | DIASTOLIC BLOOD PRESSURE: 94 MMHG

## 2023-08-30 PROCEDURE — 99214 OFFICE O/P EST MOD 30 MIN: CPT

## 2023-08-30 RX ORDER — DAPAGLIFLOZIN 10 MG/1
10 TABLET, FILM COATED ORAL DAILY
Qty: 90 | Refills: 3 | Status: DISCONTINUED | COMMUNITY
Start: 2023-03-10 | End: 2023-08-30

## 2023-08-30 NOTE — PHYSICAL EXAM
[General Appearance - Alert] : alert [General Appearance - In No Acute Distress] : in no acute distress [General Appearance - Well Nourished] : well nourished [Sclera] : the sclera and conjunctiva were normal [Outer Ear] : the ears and nose were normal in appearance [Neck Appearance] : the appearance of the neck was normal [Respiration, Rhythm And Depth] : normal respiratory rhythm and effort [Auscultation Breath Sounds / Voice Sounds] : lungs were clear to auscultation bilaterally [Heart Rate And Rhythm] : heart rate was normal and rhythm regular [Heart Sounds] : normal S1 and S2 [Murmurs] : no murmurs [Heart Sounds Pericardial Friction Rub] : no pericardial rub [___ +] : bilateral [unfilled]+ pretibial pitting edema [Bowel Sounds] : normal bowel sounds [Abdomen Soft] : soft [Abdomen Tenderness] : non-tender [No CVA Tenderness] : no ~M costovertebral angle tenderness [Involuntary Movements] : no involuntary movements were seen [] : no rash [No Focal Deficits] : no focal deficits [Oriented To Time, Place, And Person] : oriented to person, place, and time [Affect] : the affect was normal [Mood] : the mood was normal [FreeTextEntry1] : Seen by wound care for ulcers of right and left legs. Improving.

## 2023-08-30 NOTE — REVIEW OF SYSTEMS
[Eyesight Problems] : eyesight problems [As noted in HPI] : as noted in HPI [Heart Rate Is Fast] : fast heart rate [Lower Ext Edema] : lower extremity edema [As Noted in HPI] : as noted in HPI [Negative] : Heme/Lymph [FreeTextEntry3] : Follows with ophthalmologist for retinopathy.

## 2023-08-30 NOTE — HISTORY OF PRESENT ILLNESS
[FreeTextEntry1] : MARIFER MILLER is a 43-year-old male with a history of longstanding diabetes, hypertension here for follow up for CKD stage IV. [facility] was seen [facility] and has started work-up. He lives alone. He has a healthy brother and 67yo father who now know about his CKD.  Labs done on 8/26.  Creatinine elevated and told to stop Farxiga.  Received his first Procrit on June 20th. No c/o significant fatigue. Sometimes feels colder than others.  Usually takes Amlodipine around 12 noon. 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 Transplant cards ordered echo and stress tbd in next week.  HT Nurse iMli Only taking phoslo with high dairy meals.  Dr. Fish started Semglee with better BSs reported.  Today pt c/o increased BLLE edema and increased MILLER during exercise. Ten pounds increased since last visit. Not drinking excessive fluids. No n/v. No decrease in appetite, no tremors. No foamy urine. no hematuria, no dysuria. no confusion.

## 2023-08-30 NOTE — ASSESSMENT
[FreeTextEntry1] : 42 yo male with DM, HTN, CKD4 and proteinuria CKD4 likely in setting of DM, HTN nephropathy. Creatinine trend reviewed with the patient. Discussed with patient that creatinine is progressing and close follow up is needed.  HTN: BP high today. Cont home monitoring. Renal sono with dopplers done in April - no SARAY seen. Edema: Increase Torsemide to 40mg daily. Call on Friday if no weight loss is seen.  Fluid restrict 1-1.5 Liter Low Na diet stressed.  Proteinuria: Stop Farxiga. Repeat labs next Tuesday Sept 5th. Requisition given.  DM: DM control stressed. Continue f/u as recommended.  Anemia: Procrit held today for high BP - will give next week.  MBD: take Phoslo with every meal not just high dairy.  Retinopathy: seeing ophthalmologist Ulcers: seeing wound care Low Na diet Denies NSAIDs.  Discussed with pt the progressive nature of his CKD. Discussed PD vs HD. Pt would like to speak with Dr. Paniagua about his decision. Appt made for next Friday at 9:30am to reevaluate swelling, give Procrit and confer with Dr. Paniagua.  All questions were answered.

## 2023-08-31 ENCOUNTER — APPOINTMENT (OUTPATIENT)
Dept: CARDIOLOGY | Facility: CLINIC | Age: 43
End: 2023-08-31
Payer: COMMERCIAL

## 2023-08-31 PROCEDURE — 93306 TTE W/DOPPLER COMPLETE: CPT

## 2023-09-01 ENCOUNTER — NON-APPOINTMENT (OUTPATIENT)
Age: 43
End: 2023-09-01

## 2023-09-03 ENCOUNTER — NON-APPOINTMENT (OUTPATIENT)
Age: 43
End: 2023-09-03

## 2023-09-06 LAB
ALBUMIN SERPL ELPH-MCNC: 3.6 G/DL
ANION GAP SERPL CALC-SCNC: 18 MMOL/L
BUN SERPL-MCNC: 69 MG/DL
CALCIUM SERPL-MCNC: 7.4 MG/DL
CHLORIDE SERPL-SCNC: 105 MMOL/L
CO2 SERPL-SCNC: 17 MMOL/L
CREAT SERPL-MCNC: 5.7 MG/DL
EGFR: 12 ML/MIN/1.73M2
GLUCOSE SERPL-MCNC: 102 MG/DL
HCT VFR BLD CALC: 25.4 %
HGB BLD-MCNC: 8 G/DL
MCHC RBC-ENTMCNC: 23 PG
MCHC RBC-ENTMCNC: 31.5 GM/DL
MCV RBC AUTO: 73 FL
PHOSPHATE SERPL-MCNC: 7 MG/DL
PLATELET # BLD AUTO: 241 K/UL
POTASSIUM SERPL-SCNC: 5.1 MMOL/L
RBC # BLD: 3.48 M/UL
RBC # FLD: 15.8 %
SODIUM SERPL-SCNC: 140 MMOL/L
WBC # FLD AUTO: 8.31 K/UL

## 2023-09-08 ENCOUNTER — APPOINTMENT (OUTPATIENT)
Dept: NEPHROLOGY | Facility: CLINIC | Age: 43
End: 2023-09-08
Payer: COMMERCIAL

## 2023-09-08 ENCOUNTER — APPOINTMENT (OUTPATIENT)
Dept: CARDIOLOGY | Facility: CLINIC | Age: 43
End: 2023-09-08

## 2023-09-08 VITALS
BODY MASS INDEX: 33.39 KG/M2 | HEART RATE: 102 BPM | HEIGHT: 74 IN | SYSTOLIC BLOOD PRESSURE: 142 MMHG | WEIGHT: 260.14 LBS | DIASTOLIC BLOOD PRESSURE: 84 MMHG | OXYGEN SATURATION: 98 % | TEMPERATURE: 97.6 F

## 2023-09-08 PROCEDURE — 99214 OFFICE O/P EST MOD 30 MIN: CPT | Mod: 25

## 2023-09-08 PROCEDURE — 96372 THER/PROPH/DIAG INJ SC/IM: CPT

## 2023-09-08 RX ORDER — ERYTHROPOIETIN 10000 [IU]/ML
10000 INJECTION, SOLUTION INTRAVENOUS; SUBCUTANEOUS
Qty: 1 | Refills: 0 | Status: COMPLETED | OUTPATIENT
Start: 2023-09-08

## 2023-09-08 RX ADMIN — ERYTHROPOIETIN 0 UNIT/ML: 10000 INJECTION, SOLUTION INTRAVENOUS; SUBCUTANEOUS at 00:00

## 2023-09-08 NOTE — REVIEW OF SYSTEMS
[Eyesight Problems] : eyesight problems [As noted in HPI] : as noted in HPI [Heart Rate Is Fast] : fast heart rate [Lower Ext Edema] : lower extremity edema [As Noted in HPI] : as noted in HPI [Negative] : Heme/Lymph [FreeTextEntry3] : Follows with ophthalmologist for retinopathy. [de-identified] : Michelle DM

## 2023-09-08 NOTE — ASSESSMENT
[FreeTextEntry1] : 42 yo male with DM, HTN, CKD4 and proteinuria CKD4 likely in setting of DM, HTN nephropathy. Creatinine trend reviewed with the patient. Discussed with patient that creatinine is progressing and close follow up is needed.  HTN: BP better today.  HR remains elevated. Check thyroid studies with next labs. Adding Coreg 3.125mg PO BID.  Edema: Continue Torsemide 80mg daily. Fluid restrict 1-1.5 Liter Low Na diet stressed.  Proteinuria:  Off Farxiga.  DM: DM control stressed. Continue f/u as recommended.  Anemia: Procrit 10,000 units SQ today.  MBD: take Phoslo 2 tabs with every meal not just high dairy.  Retinopathy: seeing ophthalmologist Ulcers: seeing wound care Low Na diet Denies NSAIDs.  Discussed with pt the progressive nature of his CKD. Spoek with Dr. Paniagua and toured the PD training area and is interested in PD as his chosen modality. Will refer to Dr. Nicolas for PD cath insertion when indicated.  Labs in 3 weeks. Appt with Dr. Paniagua next visit already in place. If he needs JAMES before that will schedule.  All questions were answered.

## 2023-09-08 NOTE — HISTORY OF PRESENT ILLNESS
[FreeTextEntry1] : MARIFER MILLER is a 43-year-old male with a history of longstanding diabetes, hypertension here for follow up for CKD stage IV. Patient was seen at [facility] and has started work-up. He lives alone. He has a healthy brother and 65yo father who now know about his CKD. Mother recently diagnosed with CKD-she is diabetic Labs done on 9/ 5 Off Farxiga Torsemide now 80mg daily and now seeing some swelling reduction but still significant BLLE. No c/o MILLER.  Had an echo for transplant work up- unremarkable. Still needs stress test- right now it's the last needed test for transplant workup Procrit held last week for high BP.  Started phoslo 2 tabs with all meals Increased Sodium bicarb to 4 pills daily.  HT Nurse Mili Wants to talk to Dr. Paniagua today about PD as RRT.

## 2023-09-29 ENCOUNTER — APPOINTMENT (OUTPATIENT)
Dept: CARDIOLOGY | Facility: CLINIC | Age: 43
End: 2023-09-29

## 2023-10-01 ENCOUNTER — EMERGENCY (EMERGENCY)
Facility: HOSPITAL | Age: 43
LOS: 1 days | Discharge: ANOTHER HOSPITAL | End: 2023-10-01
Attending: EMERGENCY MEDICINE | Admitting: EMERGENCY MEDICINE
Payer: COMMERCIAL

## 2023-10-01 ENCOUNTER — INPATIENT (INPATIENT)
Facility: HOSPITAL | Age: 43
LOS: 3 days | Discharge: HOME | DRG: 871 | End: 2023-10-05
Attending: STUDENT IN AN ORGANIZED HEALTH CARE EDUCATION/TRAINING PROGRAM | Admitting: STUDENT IN AN ORGANIZED HEALTH CARE EDUCATION/TRAINING PROGRAM
Payer: COMMERCIAL

## 2023-10-01 VITALS
RESPIRATION RATE: 32 BRPM | DIASTOLIC BLOOD PRESSURE: 86 MMHG | OXYGEN SATURATION: 93 % | SYSTOLIC BLOOD PRESSURE: 140 MMHG | TEMPERATURE: 99 F | HEART RATE: 94 BPM | HEIGHT: 74 IN | WEIGHT: 315 LBS

## 2023-10-01 VITALS
OXYGEN SATURATION: 100 % | HEART RATE: 92 BPM | DIASTOLIC BLOOD PRESSURE: 81 MMHG | RESPIRATION RATE: 21 BRPM | SYSTOLIC BLOOD PRESSURE: 144 MMHG | TEMPERATURE: 97 F

## 2023-10-01 VITALS — OXYGEN SATURATION: 97 % | HEART RATE: 97 BPM

## 2023-10-01 DIAGNOSIS — N17.9 ACUTE KIDNEY FAILURE, UNSPECIFIED: ICD-10-CM

## 2023-10-01 DIAGNOSIS — A41.9 SEPSIS, UNSPECIFIED ORGANISM: ICD-10-CM

## 2023-10-01 DIAGNOSIS — J81.0 ACUTE PULMONARY EDEMA: ICD-10-CM

## 2023-10-01 DIAGNOSIS — R06.03 ACUTE RESPIRATORY DISTRESS: ICD-10-CM

## 2023-10-01 DIAGNOSIS — R09.89 OTHER SPECIFIED SYMPTOMS AND SIGNS INVOLVING THE CIRCULATORY AND RESPIRATORY SYSTEMS: ICD-10-CM

## 2023-10-01 DIAGNOSIS — J96.01 ACUTE RESPIRATORY FAILURE WITH HYPOXIA: ICD-10-CM

## 2023-10-01 DIAGNOSIS — R74.01 ELEVATION OF LEVELS OF LIVER TRANSAMINASE LEVELS: ICD-10-CM

## 2023-10-01 DIAGNOSIS — Z29.9 ENCOUNTER FOR PROPHYLACTIC MEASURES, UNSPECIFIED: ICD-10-CM

## 2023-10-01 DIAGNOSIS — E11.9 TYPE 2 DIABETES MELLITUS WITHOUT COMPLICATIONS: ICD-10-CM

## 2023-10-01 DIAGNOSIS — D72.829 ELEVATED WHITE BLOOD CELL COUNT, UNSPECIFIED: ICD-10-CM

## 2023-10-01 DIAGNOSIS — Z98.890 OTHER SPECIFIED POSTPROCEDURAL STATES: Chronic | ICD-10-CM

## 2023-10-01 DIAGNOSIS — I10 ESSENTIAL (PRIMARY) HYPERTENSION: ICD-10-CM

## 2023-10-01 DIAGNOSIS — E78.5 HYPERLIPIDEMIA, UNSPECIFIED: ICD-10-CM

## 2023-10-01 DIAGNOSIS — N18.9 CHRONIC KIDNEY DISEASE, UNSPECIFIED: ICD-10-CM

## 2023-10-01 LAB
ALBUMIN SERPL ELPH-MCNC: 2.6 G/DL — LOW (ref 3.3–5)
ALP SERPL-CCNC: 63 U/L — SIGNIFICANT CHANGE UP (ref 30–120)
ALT FLD-CCNC: 239 U/L — HIGH (ref 10–60)
ANION GAP SERPL CALC-SCNC: 14 MMOL/L — SIGNIFICANT CHANGE UP (ref 5–17)
ANISOCYTOSIS BLD QL: SLIGHT — SIGNIFICANT CHANGE UP
ANISOCYTOSIS BLD QL: SLIGHT — SIGNIFICANT CHANGE UP
APPEARANCE UR: CLEAR — SIGNIFICANT CHANGE UP
APTT BLD: 31.4 SEC — SIGNIFICANT CHANGE UP (ref 24.5–35.6)
AST SERPL-CCNC: 195 U/L — HIGH (ref 10–40)
BASE EXCESS BLDA CALC-SCNC: -1.8 MMOL/L — SIGNIFICANT CHANGE UP (ref -2–3)
BASE EXCESS BLDA CALC-SCNC: -1.8 MMOL/L — SIGNIFICANT CHANGE UP (ref -2–3)
BASOPHILS # BLD AUTO: 0.03 K/UL — SIGNIFICANT CHANGE UP (ref 0–0.2)
BASOPHILS # BLD AUTO: 0.05 K/UL — SIGNIFICANT CHANGE UP (ref 0–0.2)
BASOPHILS NFR BLD AUTO: 0.2 % — SIGNIFICANT CHANGE UP (ref 0–2)
BASOPHILS NFR BLD AUTO: 0.4 % — SIGNIFICANT CHANGE UP (ref 0–2)
BILIRUB SERPL-MCNC: 0.4 MG/DL — SIGNIFICANT CHANGE UP (ref 0.2–1.2)
BILIRUB UR-MCNC: NEGATIVE — SIGNIFICANT CHANGE UP
BLD GP AB SCN SERPL QL: SIGNIFICANT CHANGE UP
BLOOD GAS COMMENTS ARTERIAL: SIGNIFICANT CHANGE UP
BUN SERPL-MCNC: 112 MG/DL — HIGH (ref 7–23)
CALCIUM SERPL-MCNC: 7.2 MG/DL — LOW (ref 8.4–10.5)
CHLORIDE SERPL-SCNC: 97 MMOL/L — SIGNIFICANT CHANGE UP (ref 96–108)
CK MB BLD-MCNC: 0.6 % — SIGNIFICANT CHANGE UP (ref 0–3.5)
CK MB CFR SERPL CALC: 16.2 NG/ML — HIGH (ref 0–3.6)
CK SERPL-CCNC: 2666 U/L — HIGH (ref 26–308)
CO2 SERPL-SCNC: 22 MMOL/L — SIGNIFICANT CHANGE UP (ref 22–31)
COLOR SPEC: YELLOW — SIGNIFICANT CHANGE UP
CREAT SERPL-MCNC: 5.99 MG/DL — HIGH (ref 0.5–1.3)
DIFF PNL FLD: ABNORMAL
EGFR: 11 ML/MIN/1.73M2 — LOW
EOSINOPHIL # BLD AUTO: 0.04 K/UL — SIGNIFICANT CHANGE UP (ref 0–0.5)
EOSINOPHIL # BLD AUTO: 0.08 K/UL — SIGNIFICANT CHANGE UP (ref 0–0.5)
EOSINOPHIL NFR BLD AUTO: 0.3 % — SIGNIFICANT CHANGE UP (ref 0–6)
EOSINOPHIL NFR BLD AUTO: 0.6 % — SIGNIFICANT CHANGE UP (ref 0–6)
FLUAV AG NPH QL: SIGNIFICANT CHANGE UP
FLUBV AG NPH QL: SIGNIFICANT CHANGE UP
GLUCOSE SERPL-MCNC: 164 MG/DL — HIGH (ref 70–99)
GLUCOSE UR QL: NEGATIVE MG/DL — SIGNIFICANT CHANGE UP
HCO3 BLDA-SCNC: 22 MMOL/L — SIGNIFICANT CHANGE UP (ref 21–28)
HCO3 BLDA-SCNC: 22 MMOL/L — SIGNIFICANT CHANGE UP (ref 21–28)
HCT VFR BLD CALC: 23.2 % — LOW (ref 39–50)
HCT VFR BLD CALC: 24.1 % — LOW (ref 39–50)
HGB BLD-MCNC: 7 G/DL — CRITICAL LOW (ref 13–17)
HGB BLD-MCNC: 7.5 G/DL — LOW (ref 13–17)
HOROWITZ INDEX BLDA+IHG-RTO: 100 — SIGNIFICANT CHANGE UP
HOROWITZ INDEX BLDA+IHG-RTO: 60 — SIGNIFICANT CHANGE UP
HYPOCHROMIA BLD QL: SLIGHT — SIGNIFICANT CHANGE UP
HYPOCHROMIA BLD QL: SLIGHT — SIGNIFICANT CHANGE UP
IMM GRANULOCYTES NFR BLD AUTO: 0.3 % — SIGNIFICANT CHANGE UP (ref 0–0.9)
IMM GRANULOCYTES NFR BLD AUTO: 0.7 % — SIGNIFICANT CHANGE UP (ref 0–0.9)
INR BLD: 1.48 RATIO — HIGH (ref 0.85–1.18)
KETONES UR-MCNC: NEGATIVE MG/DL — SIGNIFICANT CHANGE UP
LACTATE SERPL-SCNC: 0.9 MMOL/L — SIGNIFICANT CHANGE UP (ref 0.7–2)
LACTATE SERPL-SCNC: 0.9 MMOL/L — SIGNIFICANT CHANGE UP (ref 0.7–2)
LEUKOCYTE ESTERASE UR-ACNC: NEGATIVE — SIGNIFICANT CHANGE UP
LYMPHOCYTES # BLD AUTO: 0.43 K/UL — LOW (ref 1–3.3)
LYMPHOCYTES # BLD AUTO: 0.74 K/UL — LOW (ref 1–3.3)
LYMPHOCYTES # BLD AUTO: 3.3 % — LOW (ref 13–44)
LYMPHOCYTES # BLD AUTO: 5.4 % — LOW (ref 13–44)
MANUAL SMEAR VERIFICATION: SIGNIFICANT CHANGE UP
MANUAL SMEAR VERIFICATION: SIGNIFICANT CHANGE UP
MCHC RBC-ENTMCNC: 22.6 PG — LOW (ref 27–34)
MCHC RBC-ENTMCNC: 23.3 PG — LOW (ref 27–34)
MCHC RBC-ENTMCNC: 30.2 GM/DL — LOW (ref 32–36)
MCHC RBC-ENTMCNC: 31.1 GM/DL — LOW (ref 32–36)
MCV RBC AUTO: 74.8 FL — LOW (ref 80–100)
MCV RBC AUTO: 74.8 FL — LOW (ref 80–100)
MICROCYTES BLD QL: SLIGHT — SIGNIFICANT CHANGE UP
MICROCYTES BLD QL: SLIGHT — SIGNIFICANT CHANGE UP
MONOCYTES # BLD AUTO: 0.45 K/UL — SIGNIFICANT CHANGE UP (ref 0–0.9)
MONOCYTES # BLD AUTO: 1.42 K/UL — HIGH (ref 0–0.9)
MONOCYTES NFR BLD AUTO: 10.4 % — SIGNIFICANT CHANGE UP (ref 2–14)
MONOCYTES NFR BLD AUTO: 3.5 % — SIGNIFICANT CHANGE UP (ref 2–14)
NEUTROPHILS # BLD AUTO: 11.27 K/UL — HIGH (ref 1.8–7.4)
NEUTROPHILS # BLD AUTO: 11.93 K/UL — HIGH (ref 1.8–7.4)
NEUTROPHILS NFR BLD AUTO: 82.9 % — HIGH (ref 43–77)
NEUTROPHILS NFR BLD AUTO: 92 % — HIGH (ref 43–77)
NITRITE UR-MCNC: NEGATIVE — SIGNIFICANT CHANGE UP
NRBC # BLD: 0 /100 WBCS — SIGNIFICANT CHANGE UP (ref 0–0)
NRBC # BLD: 0 /100 WBCS — SIGNIFICANT CHANGE UP (ref 0–0)
NT-PROBNP SERPL-SCNC: HIGH PG/ML (ref 0–125)
PCO2 BLDA: 32 MMHG — LOW (ref 35–48)
PCO2 BLDA: 33 MMHG — LOW (ref 35–48)
PH BLDA: 7.44 — SIGNIFICANT CHANGE UP (ref 7.35–7.45)
PH BLDA: 7.45 — SIGNIFICANT CHANGE UP (ref 7.35–7.45)
PH UR: 6.5 — SIGNIFICANT CHANGE UP (ref 5–8)
PLAT MORPH BLD: NORMAL — SIGNIFICANT CHANGE UP
PLAT MORPH BLD: NORMAL — SIGNIFICANT CHANGE UP
PLATELET # BLD AUTO: 265 K/UL — SIGNIFICANT CHANGE UP (ref 150–400)
PLATELET # BLD AUTO: 272 K/UL — SIGNIFICANT CHANGE UP (ref 150–400)
PO2 BLDA: 159 MMHG — HIGH (ref 83–108)
PO2 BLDA: 91 MMHG — SIGNIFICANT CHANGE UP (ref 83–108)
POLYCHROMASIA BLD QL SMEAR: SLIGHT — SIGNIFICANT CHANGE UP
POTASSIUM SERPL-MCNC: 5 MMOL/L — SIGNIFICANT CHANGE UP (ref 3.5–5.3)
POTASSIUM SERPL-SCNC: 5 MMOL/L — SIGNIFICANT CHANGE UP (ref 3.5–5.3)
PROT SERPL-MCNC: 6.6 G/DL — SIGNIFICANT CHANGE UP (ref 6–8.3)
PROT UR-MCNC: 300 MG/DL
PROTHROM AB SERPL-ACNC: 16 SEC — HIGH (ref 9.5–13)
RBC # BLD: 3.1 M/UL — LOW (ref 4.2–5.8)
RBC # BLD: 3.22 M/UL — LOW (ref 4.2–5.8)
RBC # FLD: 15.8 % — HIGH (ref 10.3–14.5)
RBC # FLD: 15.8 % — HIGH (ref 10.3–14.5)
RBC BLD AUTO: ABNORMAL
RBC BLD AUTO: ABNORMAL
RSV RNA NPH QL NAA+NON-PROBE: SIGNIFICANT CHANGE UP
SAO2 % BLDA: 97.4 % — SIGNIFICANT CHANGE UP (ref 94–98)
SAO2 % BLDA: 98.5 % — HIGH (ref 94–98)
SARS-COV-2 RNA SPEC QL NAA+PROBE: SIGNIFICANT CHANGE UP
SODIUM SERPL-SCNC: 133 MMOL/L — LOW (ref 135–145)
SP GR SPEC: 1.01 — SIGNIFICANT CHANGE UP (ref 1–1.03)
TROPONIN I, HIGH SENSITIVITY RESULT: 83 NG/L — HIGH
TROPONIN I, HIGH SENSITIVITY RESULT: 91.9 NG/L — HIGH
UROBILINOGEN FLD QL: 0.2 MG/DL — SIGNIFICANT CHANGE UP (ref 0.2–1)
WBC # BLD: 12.97 K/UL — HIGH (ref 3.8–10.5)
WBC # BLD: 13.6 K/UL — HIGH (ref 3.8–10.5)
WBC # FLD AUTO: 12.97 K/UL — HIGH (ref 3.8–10.5)
WBC # FLD AUTO: 13.6 K/UL — HIGH (ref 3.8–10.5)

## 2023-10-01 PROCEDURE — 80053 COMPREHEN METABOLIC PANEL: CPT

## 2023-10-01 PROCEDURE — 96375 TX/PRO/DX INJ NEW DRUG ADDON: CPT

## 2023-10-01 PROCEDURE — 85610 PROTHROMBIN TIME: CPT

## 2023-10-01 PROCEDURE — 84484 ASSAY OF TROPONIN QUANT: CPT

## 2023-10-01 PROCEDURE — 99291 CRITICAL CARE FIRST HOUR: CPT | Mod: 25

## 2023-10-01 PROCEDURE — 71045 X-RAY EXAM CHEST 1 VIEW: CPT | Mod: 26,77

## 2023-10-01 PROCEDURE — 71045 X-RAY EXAM CHEST 1 VIEW: CPT | Mod: 26

## 2023-10-01 PROCEDURE — 94640 AIRWAY INHALATION TREATMENT: CPT

## 2023-10-01 PROCEDURE — 85730 THROMBOPLASTIN TIME PARTIAL: CPT

## 2023-10-01 PROCEDURE — 82803 BLOOD GASES ANY COMBINATION: CPT

## 2023-10-01 PROCEDURE — 94660 CPAP INITIATION&MGMT: CPT

## 2023-10-01 PROCEDURE — 93010 ELECTROCARDIOGRAM REPORT: CPT

## 2023-10-01 PROCEDURE — 87637 SARSCOV2&INF A&B&RSV AMP PRB: CPT

## 2023-10-01 PROCEDURE — 87040 BLOOD CULTURE FOR BACTERIA: CPT

## 2023-10-01 PROCEDURE — 36415 COLL VENOUS BLD VENIPUNCTURE: CPT

## 2023-10-01 PROCEDURE — 96365 THER/PROPH/DIAG IV INF INIT: CPT

## 2023-10-01 PROCEDURE — 83605 ASSAY OF LACTIC ACID: CPT

## 2023-10-01 PROCEDURE — 83880 ASSAY OF NATRIURETIC PEPTIDE: CPT

## 2023-10-01 PROCEDURE — 93005 ELECTROCARDIOGRAM TRACING: CPT

## 2023-10-01 PROCEDURE — 99291 CRITICAL CARE FIRST HOUR: CPT

## 2023-10-01 PROCEDURE — L9997: CPT

## 2023-10-01 PROCEDURE — 99223 1ST HOSP IP/OBS HIGH 75: CPT | Mod: GC

## 2023-10-01 PROCEDURE — 71045 X-RAY EXAM CHEST 1 VIEW: CPT

## 2023-10-01 PROCEDURE — 85025 COMPLETE CBC W/AUTO DIFF WBC: CPT

## 2023-10-01 RX ORDER — PANTOPRAZOLE SODIUM 20 MG/1
40 TABLET, DELAYED RELEASE ORAL ONCE
Refills: 0 | Status: COMPLETED | OUTPATIENT
Start: 2023-10-01 | End: 2023-10-01

## 2023-10-01 RX ORDER — IPRATROPIUM/ALBUTEROL SULFATE 18-103MCG
3 AEROSOL WITH ADAPTER (GRAM) INHALATION ONCE
Refills: 0 | Status: COMPLETED | OUTPATIENT
Start: 2023-10-01 | End: 2023-10-01

## 2023-10-01 RX ORDER — PIPERACILLIN AND TAZOBACTAM 4; .5 G/20ML; G/20ML
3.38 INJECTION, POWDER, LYOPHILIZED, FOR SOLUTION INTRAVENOUS ONCE
Refills: 0 | Status: DISCONTINUED | OUTPATIENT
Start: 2023-10-02 | End: 2023-10-02

## 2023-10-01 RX ORDER — FUROSEMIDE 40 MG
60 TABLET ORAL ONCE
Refills: 0 | Status: COMPLETED | OUTPATIENT
Start: 2023-10-01 | End: 2023-10-01

## 2023-10-01 RX ORDER — PIPERACILLIN AND TAZOBACTAM 4; .5 G/20ML; G/20ML
3.38 INJECTION, POWDER, LYOPHILIZED, FOR SOLUTION INTRAVENOUS EVERY 12 HOURS
Refills: 0 | Status: DISCONTINUED | OUTPATIENT
Start: 2023-10-03 | End: 2023-10-02

## 2023-10-01 RX ORDER — CEFTRIAXONE 500 MG/1
1000 INJECTION, POWDER, FOR SOLUTION INTRAMUSCULAR; INTRAVENOUS ONCE
Refills: 0 | Status: COMPLETED | OUTPATIENT
Start: 2023-10-01 | End: 2023-10-01

## 2023-10-01 RX ORDER — PANTOPRAZOLE SODIUM 20 MG/1
40 TABLET, DELAYED RELEASE ORAL DAILY
Refills: 0 | Status: DISCONTINUED | OUTPATIENT
Start: 2023-10-02 | End: 2023-10-02

## 2023-10-01 RX ORDER — CHLORHEXIDINE GLUCONATE 213 G/1000ML
1 SOLUTION TOPICAL
Refills: 0 | Status: DISCONTINUED | OUTPATIENT
Start: 2023-10-01 | End: 2023-10-05

## 2023-10-01 RX ORDER — PIPERACILLIN AND TAZOBACTAM 4; .5 G/20ML; G/20ML
3.38 INJECTION, POWDER, LYOPHILIZED, FOR SOLUTION INTRAVENOUS ONCE
Refills: 0 | Status: COMPLETED | OUTPATIENT
Start: 2023-10-02 | End: 2023-10-02

## 2023-10-01 RX ORDER — PIPERACILLIN AND TAZOBACTAM 4; .5 G/20ML; G/20ML
3.38 INJECTION, POWDER, LYOPHILIZED, FOR SOLUTION INTRAVENOUS ONCE
Refills: 0 | Status: COMPLETED | OUTPATIENT
Start: 2023-10-01 | End: 2023-10-01

## 2023-10-01 RX ORDER — AZITHROMYCIN 500 MG/1
500 TABLET, FILM COATED ORAL ONCE
Refills: 0 | Status: DISCONTINUED | OUTPATIENT
Start: 2023-10-01 | End: 2023-10-05

## 2023-10-01 RX ORDER — FUROSEMIDE 40 MG
40 TABLET ORAL ONCE
Refills: 0 | Status: COMPLETED | OUTPATIENT
Start: 2023-10-01 | End: 2023-10-01

## 2023-10-01 RX ORDER — HEPARIN SODIUM 5000 [USP'U]/ML
5000 INJECTION INTRAVENOUS; SUBCUTANEOUS EVERY 8 HOURS
Refills: 0 | Status: DISCONTINUED | OUTPATIENT
Start: 2023-10-01 | End: 2023-10-04

## 2023-10-01 RX ORDER — INFLUENZA VIRUS VACCINE 15; 15; 15; 15 UG/.5ML; UG/.5ML; UG/.5ML; UG/.5ML
0.5 SUSPENSION INTRAMUSCULAR ONCE
Refills: 0 | Status: DISCONTINUED | OUTPATIENT
Start: 2023-10-01 | End: 2023-10-05

## 2023-10-01 RX ADMIN — CEFTRIAXONE 100 MILLIGRAM(S): 500 INJECTION, POWDER, FOR SOLUTION INTRAMUSCULAR; INTRAVENOUS at 20:00

## 2023-10-01 RX ADMIN — Medication 60 MILLIGRAM(S): at 21:12

## 2023-10-01 RX ADMIN — Medication 3 MILLILITER(S): at 18:31

## 2023-10-01 RX ADMIN — HEPARIN SODIUM 5000 UNIT(S): 5000 INJECTION INTRAVENOUS; SUBCUTANEOUS at 22:46

## 2023-10-01 RX ADMIN — PANTOPRAZOLE SODIUM 40 MILLIGRAM(S): 20 TABLET, DELAYED RELEASE ORAL at 21:27

## 2023-10-01 RX ADMIN — Medication 40 MILLIGRAM(S): at 18:40

## 2023-10-01 RX ADMIN — CEFTRIAXONE 1000 MILLIGRAM(S): 500 INJECTION, POWDER, FOR SOLUTION INTRAMUSCULAR; INTRAVENOUS at 20:30

## 2023-10-01 RX ADMIN — Medication 125 MILLIGRAM(S): at 18:41

## 2023-10-01 RX ADMIN — PIPERACILLIN AND TAZOBACTAM 200 GRAM(S): 4; .5 INJECTION, POWDER, LYOPHILIZED, FOR SOLUTION INTRAVENOUS at 23:45

## 2023-10-01 NOTE — ED PROVIDER NOTE - PROGRESS NOTE DETAILS
pt reports improved on bipap d/w bakari (renal) he recommends xer to [facility] if possible, called xfer center, they relate [facility] on diversion, bakari (renal) notified, [facility] will [facility]. d/w sue (plv[facility] (hospitalist) he will admit pt at [facility]. d/w PAULETTE MURDOCK, will see pt in [facility] ER. d/w lauren ([facility] er) will accept xfer er->er

## 2023-10-01 NOTE — ED PROVIDER NOTE - MUSCULOSKELETAL, MLM
Spine appears normal, range of motion is not limited, no muscle or joint tenderness bilateral pedal edema

## 2023-10-01 NOTE — ED PROVIDER NOTE - NS_EDPROVIDERDISPOUSERTYPE_ED_A_ED
[facility]  Procedure History and Physical Update          Patient Name: Nicolasa Downs  MRN:    [id number]  YOB: 1955  Date of evaluation:  4/21/2021    Procedure:    JULIET    Indication for procedure:  History of aortic valve replacement, with low EF new murmur    Please refer to the office note completed by Dr. Nannette Nichols on 4/6/21 in the medical record and note that:    [x] I have examined the patient and reviewed the H&P/Consult and there are no changes to be made to the assessment or plan. [] I have examined the patient and reviewed the H&P/Consult and have noted the following changes:    Past Medical History:   Diagnosis Date    Aortic stenosis     Bicuspid aortic valve     Cancer ([address]     skin    Colitis, ulcerative (HCC)     Dr. Margo Charles sees yearly    Dyslipidemia     GERD (gastroesophageal reflux disease)    Baldemar Winder Dr. Garcia Bernard murmur     Dr. Brooklyn Tapia last visit 9/25/2020    Hypertension     Dr. Phil Jose IBD (inflammatory bowel disease)     Skin cancer     [facility] D[facility] surgery in Jan. 2020    Snores     Wears glasses     Wears prescription eyeglasses    Viky Alevism examination     Dr. Tiffanie Sherman last visit 10/22/2020       Past Surgical History:   Procedure Laterality Date    AORTIC VALVE REPAIR N/A 11/6/2020    AORTIC VALVE REPLACEMENT 25MM INSPIRIS, JULIET performed by Alvin Schneider MD at [address]  10/08/2020    RIGHT AND LEFT  /  DR Dinorah Duarte  /  NML LV SYSTOLIC FUNCTION / Severe aortic regurgitation. Dilated aortic root.    CT CONSULT    COLONOSCOPY      EYE SURGERY Bilateral     LASER TO BOTH EYES    SKIN CANCER EXCISION      TESTICLE SURGERY Right     1992 benign adenoma       Family History   Problem Relation Age of Onset    COPD Mother     Alzheimer's Disease Mother     Heart Disease Mother     Stroke Father     Lung Cancer Father     High Blood Pressure Father     Cancer Father         lung       Allergies   Allergen Reactions    Penicillins Hives    Percocet [Oxycodone-Acetaminophen] Itching       Prior to Admission medications    Medication Sig Start Date End Date Taking?  Authorizing Provider   acetaminophen (TYLENOL) 500 MG tablet Take 500 mg by mouth every 6 hours as needed for Pain   Yes Historical Provider, MD   atorvastatin (LIPITOR) 20 MG tablet TAKE 1 TABLET DAILY 2/2/21  Yes Ethan Carpenter DO   finasteride (PROSCAR) 5 MG tablet Take 1 tablet by mouth daily 12/21/20  Yes Ethan Carpenter DO   aspirin 81 MG EC tablet Take 1 tablet by mouth daily 11/10/20  Yes HOLA Coe CNP   lisinopril (PRINIVIL;ZESTRIL) 10 MG tablet Take 1 tablet by mouth daily 11/10/20  Yes HOLA Coe CNP   metoprolol tartrate (LOPRESSOR) 25 MG tablet Take 1 tablet by mouth 2 times daily 11/10/20  Yes HOLA Coe CNP   furosemide (LASIX) 20 MG tablet Take 2 tablets by mouth daily 11/10/20  Yes HOLA Coe - CNP   Calcium Carb-Cholecalciferol (CALCIUM 1000 + D PO) Take by mouth   Yes Historical Provider, MD   omeprazole (PRILOSEC) 20 MG delayed release capsule Take 20 mg by mouth daily   Yes Historical Provider, MD   Multiple Vitamins-Minerals (THERAPEUTIC MULTIVITAMIN-MINERALS) tablet Take 1 tablet by mouth daily   Yes Historical Provider, MD   sulfaSALAzine (AZULFIDINE) 500 MG tablet Take 500 mg by mouth 2 times daily    Yes Historical Provider, MD   clindamycin (CLEOCIN) 300 MG capsule Take 300 mg by mouth 3 times daily As needed for dental work    Historical Provider, MD   alendronate (FOSAMAX) 70 MG tablet TAKE 1 TABLET EVERY 7 DAYS 2/8/21   Ethan Carpenter DO         Vitals:    04/21/21 0810   BP: 130/88   Pulse: 63   Resp: 15   Temp: 98.6 °F (37 °C)   SpO2: 97%       Constitutional and General Appearance:   alert, cooperative, no distress and appears stated age  HEENT:  · PERRL, EOMI  Respiratory:  · Normal excursion and expansion without use of accessory muscles  · Resp Auscultation:  Good respiratory effort. No for increased work of breathing. · On auscultation: clear to auscultation bilaterally  Cardiovascular:  · Regular rate and rhythm. · D4/B5  · 3/6 systolic murmur  · The apical impulse is not displaced  Abdomen:  · Soft  · Bowel sounds present  · Non-tender to palpation  Extremities:  · No cyanosis or clubbing  · Lower extremity edema: No  Skin:  · Warm and dry  Neurological:  · Alert and oriented. · Moves all extremities well    DATA:   Echocardiogram on 03/19/2021 LVEF 87-11% diastolic dysfunction normally function bioprosthetic aortic valve    Impression:  1. History of AVR, with new murmur and recently reduced EF    Plan:  · Proceed with planned procedure. · Further orders to follow. Transesophageal echocardiography (procedure) has been fully reviewed with the patient and written informed consent has been obtained. The risks, benefits, and alternatives were discussed, in detail, with patient. Briefly, the potential risks include, but are not limited to, bleeding, damage to teeth or pharynx, esophageal damage or rupture, chamber perforation, tamponade, respiratory failure requiring intubation, need for emergent surgery, and even death. All questions and concerns were answered. Patient agreed to proceed with the procedure understanding the above risks and alternatives to the procedure. Risks, benefits, alternatives, and details discussed extensively. Accepts and consents.       Electronically signed on 04/21/21 at 8:53 AM by:    Holley Saeed MD   Fellow, [address] Attending Attestation (For Attendings USE Only)...

## 2023-10-01 NOTE — H&P ADULT - HISTORY OF PRESENT ILLNESS
43y male with PMHx of type 2 DM (on insulin) with diabetic retinopathy/neuropathy, CKD Stage IV (on transplant list), HTN, HLD, HFpEF (on torsemide) presented to  ER for respiratory distress. Placed on BIPAP in the ER with improvement and transferred to [facility] for possible hemodialysis.    In the ED,  Vital Signs T(F)Max: 99 HR: 92 BP: 144/81 RR: 21 SpO2: % on BIPAP  Labs significant for: WBC 13.6, H/H 7.0/23.2, Na 133, BUN/Cr 112/5.99, Glu 164, , , HsTi 83.0, proBNP 97458  CXR: Bilateral patchy opacities right greater than left which may represent multifocal pneumonia or asymmetric pulmonary edema  Given IV lasix 40mg, rocephin/azithro, solu-medrol 125mg, duonebs  Nephro Pancho consulted rec transfer to [facility] (on diversion)  Nephro accepted to [facility] and recs Lasix 80 mg q8-12h vs Lasix gtt  To evaluate for hemodialysis in AM 43y male with PMHx of type 2 DM (on insulin) with diabetic retinopathy/neuropathy, CKD Stage IV (on transplant list), HTN, HLD, HFpEF (on torsemide) presented to  ER for respiratory distress. Placed on BIPAP in the ER with improvement and transferred to [facility] for possible hemodialysis.    In the ED,  Vital Signs T(F)Max: 99 HR: 92 BP: 144/81 RR: 21 SpO2: % on BIPAP  Labs significant for: WBC 13.6, H/H 7.0/23.2, Na 133, BUN/Cr 112/5.99, Glu 164, , , HsTi 83.0, proBNP 94123  CXR: Bilateral patchy opacities right greater than left which may represent multifocal pneumonia or asymmetric pulmonary edema  EKG: NSR at 96bpm, prolonged QT  Given IV lasix 40mg, rocephin/azithro, solu-medrol 125mg, duonebs  Nephro Pancho consulted rec transfer to [facility] (on diversion)  Nephro accepted to [facility] and recs Lasix 80 mg q8-12h vs Lasix gtt  To evaluate for hemodialysis in AM 43yM pmhx T2DM (on insulin) with diabetic retinopathy/neuropathy, CKD Stage IV (on transplant list), HTN, HLD, HFpEF (on torsemide) initially presented to [facility] ED for SOB x 4 days associated with subjective fevers and productive cough. Patient notes he returned from a vacation in [facility] [facility], and [place] 4 days ago with the longest flight being 18 hours. He missed 2 days of medications while in the trip. Upon returning to the [place] he began developing symptoms that prompted him to come to the ED today. Denies chest pain, abdominal pain, n/v/d, dysuria, hematuria. Placed on BIPAP in the ER at [facility] with improvement and transferred to [facility] for possible hemodialysis.    [facility] ED Course  Vitals: 99F, 92bpm, 144/81, 21rpm, % on BIPAP 10/5 60% HR: 92 BP: 144/81 RR: 21 SpO2: % on BIPAP  Labs significant for: WBC 13.6, H/H 7.0/23.2, Na 133, BUN/Cr 112/5.99, Glu 164, , , HsTi 83.0, proBNP 17317  CXR: Bilateral patchy opacities right greater than left which may represent multifocal pneumonia or asymmetric pulmonary edema  EKG: NSR at 96bpm, prolonged QT  Given IV lasix 40mg, rocephin/azithro, solu-medrol 125mg, duonebs  Nephro Pancho consulted rec transfer to [facility] (on diversion)  Nephro accepted to [facility] and recs Lasix 80 mg q8-12h vs Lasix gtt  To evaluate for hemodialysis in AM    [facility] ED: received 60mg IV lasix and admitted to the ICU

## 2023-10-01 NOTE — H&P ADULT - PROBLEM SELECTOR PLAN 2
Secondary to renal failure/HFpEF  Noted ECHO done for pre-transplant evaluation - normal EF  On torsemide 80mg qd, recently upped to 80mg BID  Continue IV diuretics as above - may ultimately need HD  Daily weight's, Strict I's and O's  Trop slightly elevated at 83 likely demand in setting of resp distress/renal failure  Trend trops to peak and follow up ECHO Met sepsis criteria: HR, WBC, source, respiratory rate  - likely 2/2 AHRF due to PNA vs acute pulmonary edema  - will hold off on sepsis fluids given concern for volume overload in the setting of pulmonary edema 2/2 CHF vs CKD  - f/u culture data  - plan as above Met sepsis criteria: HR, WBC, source, respiratory rate  - likely 2/2 AHRF due to PNA vs acute pulmonary edema vs PE  - will hold off on sepsis fluids given concern for volume overload in the setting of pulmonary edema 2/2 CHF vs CKD  - ICU care for now; plan as above Secondary to renal failure/HFpEF  - Noted ECHO done for pre-transplant evaluation - normal EF  - On torsemide 80mg qd, recently upped to alternating doses of 80mg qd and BID  - Continue IV diuretics as above - may ultimately need HD  - Daily weight's, Strict I's and O's  - Trop slightly elevated at 83 likely demand in setting of resp distress/renal failure  - Trend trops to peak and follow up ECHO  - F/u Cardiology consult

## 2023-10-01 NOTE — ED ADULT TRIAGE NOTE - CHIEF COMPLAINT QUOTE
per ems as a transfer from [facility] ED. pt arrives on bipap, went to [facility] for shortness of breath. hx of diabetes, stage 4 renal disease. pt has been having swelling to all extremities and coffee ground emesis.

## 2023-10-01 NOTE — H&P ADULT - NSICDXPASTMEDICALHX_GEN_ALL_CORE_FT
PAST MEDICAL HISTORY:  History of diabetic retinopathy     HLD (hyperlipidemia)     HTN (hypertension)     Insulin dependent type 2 diabetes mellitus     Stage 4 chronic kidney disease

## 2023-10-01 NOTE — ED ADULT NURSE NOTE - CHIEF COMPLAINT QUOTE
per ems as a transfer from [facility] ED. pt arrives on bipap, went to [facility] for shortness of breath. hx of diabetes, stage 4 renal disease. pt has been having swelling to all extremities and coffee ground emesis.

## 2023-10-01 NOTE — ED PROVIDER NOTE - CLINICAL SUMMARY MEDICAL DECISION MAKING FREE TEXT BOX
Acutely ill male with history of dialysis pending, presents to the outside hospital with sudden onset of shortness of breath diagnosed with congestive heart failure placed on BiPAP.  Transferred emergently to [facility] for dialysis, nephrology consultation, admission to the ICU.  ICU is aware, medicine is aware.  Admit to the hospital continue therapies treat as needed.  This chart was made with dictation software and may contain typographical errors.

## 2023-10-01 NOTE — ED ADULT NURSE NOTE - OBJECTIVE STATEMENT
43 y.o female received aox4 ambulatory c/o left hand/arm pain x5 days becoming progressively worse. pt reports redness and swelling to left 4th digit 5 days ago which progressed into red streaking through left upper extremity. denies trauma to site. g

## 2023-10-01 NOTE — H&P ADULT - PROBLEM SELECTOR PLAN 7
Continue home coreg and amlodipine  Hold home torsemide as getting IV diuretics  Monitor hemodynamics Chronic, uncontrolled  - Hold home januvia  - Continue basal insulin (on semglee at home) with lantus  - Continue moderate dose insulin corrective scale  - Check A1C Chronic, uncontrolled  - Hold home januvia  - Continue basal insulin (on semglee at home) with lantus  - Continue ISS while inpatient   - Check A1C Chronic, uncontrolled  - Hold home januvia  - Continue basal insulin  - Continue ISS while inpatient   - Check A1C

## 2023-10-01 NOTE — ED ADULT NURSE REASSESSMENT NOTE - NS ED NURSE REASSESS COMMENT FT1
pt received at change of shift, pt resting in stretcher, on BIPAP, IPAP 10,EPAP 5, rate 14, Fio2 60%,  pt tolerating BIPAP, bed in low position, call bell within reach, plan of care discussed, will monitor.  covid-19 swab done and sent to lab. pt transferring to [facility] , report given to LEE Potter.

## 2023-10-01 NOTE — H&P ADULT - PROBLEM SELECTOR PLAN 3
BLCr ~4, has been progressively worsening with Cr 5.7 in 9/2023  Continue IV diuretics per renal  Assess for possible hemodialysis - no urgent dialytic indication  Nephrology consulted Dr. Deleon ([facility] group) Secondary to renal failure/HFpEF  - Noted ECHO done for pre-transplant evaluation - normal EF  - On torsemide 80mg qd, recently upped to alternating doses of 80mg qd and BID  - Continue IV diuretics as above - may ultimately need HD  - Daily weight's, Strict I's and O's  - Trop slightly elevated at 83 likely demand in setting of resp distress/renal failure  - Trend trops to peak and follow up ECHO  - F/u Cardiology consult Met sepsis criteria: HR, WBC, source: PNA, respiratory rate   - likely 2/2 AHRF due to PNA vs acute pulmonary edema vs PE  - CXR with possible R sided PNA vs asymmetric pulm edema  - Has subjective fever at home with cough - would cover for PNA  - He is clinically hypervolemic with anasarca - may need dialysis if not improving with diuretics  - will hold off on sepsis fluids given concern for volume overload in the setting of pulmonary edema 2/2 CHF vs CKD  - ICU care for now; plan as above

## 2023-10-01 NOTE — ED PROVIDER NOTE - OBJECTIVE STATEMENT
Patient brought in by EMS for respiratory distress.  Per EMS report patient has stage IV renal disease diabetes has been having worsening shortness of breath edema of all 4 extremities and swelling of the abdomen worsening for the past 4 days.  Report patient is not on dialysis yet does not have a fistula.  Limited history from patient due to respiratory distress however he is able to speak few words at a time.  Patient denies chest pain or abdominal pain.  Patient vomited a large few days ago. Patient brought in by EMS for respiratory distress.  Per EMS report patient has stage IV renal disease diabetes has been having worsening shortness of breath edema of all 4 extremities and swelling of the abdomen worsening for the past 4 days.  Report patient is not on dialysis yet does not have a fistula.  Limited history from patient due to respiratory distress however he is able to speak few words at a time.  Patient denies chest pain or abdominal pain.  Patient vomited a large few days ago.  PMD kelli Renal eulalio

## 2023-10-01 NOTE — ED PROVIDER NOTE - CLINICAL SUMMARY MEDICAL DECISION MAKING FREE TEXT BOX
Patient brought in by EMS for respiratory distress.  Per EMS report patient has stage IV renal disease diabetes has been having worsening shortness of breath edema of all 4 extremities and swelling of the abdomen worsening for the past 4 days.  Report patient is not on dialysis yet does not have a fistula.  Limited history from patient due to respiratory distress however he is able to speak few words at a time.  Patient denies chest pain or abdominal pain.  Patient vomited a large few days ago.    Plan EKG chest x-ray labs Lasix DuoNeb Solu-Medrol BiPAP    Differential including but not limited to MI CHF fluid overload renal failure reactive airway electrolyte abnormality anemia hypoxemia hypercarb

## 2023-10-01 NOTE — H&P ADULT - NSHPREVIEWOFSYSTEMS_GEN_ALL_CORE
CONSTITUTIONAL: + subjective fever and fatigue  HEENT: denies blurred vision, sore throat  SKIN: denies new lesions, rash  CARDIOVASCULAR: denies chest pain, chest pressure, palpitations  RESPIRATORY: + shortness of breath, + cough with sputum production  GASTROINTESTINAL: denies nausea, vomiting, diarrhea, abdominal pain  GENITOURINARY: denies dysuria, discharge  NEUROLOGICAL: denies numbness, headache, focal weakness  MUSCULOSKELETAL: denies new joint pain, muscle aches  HEMATOLOGIC: denies gross bleeding, bruising  LYMPHATICS: +extremity swelling

## 2023-10-01 NOTE — H&P ADULT - ATTENDING COMMENTS
43yM pmhx T2DM (on insulin) with diabetic retinopathy/neuropathy, CKD Stage IV (on transplant list), HTN, HLD, HFpEF admitted for acute hypoxic respiratory failure 2/2 PNA vs pulmonary edema also with GABI on CKD4. Admit to ICU. Continue IV diuresis per nephrology. Would cover for PNA. Sepsis POA. Blood cultures drawn in [facility]. Had recent travel and sick symptoms. Worsening renal failure with hypervolemia/anasarca. Trial of diuresis but may ultimately need hemodialysis. Trend CE's and LFT's. Hold statin. Check 2D ECHO and LE dopplers. VTE prophylaxis. At risk for abrupt decompensation. Further treatment will be dependent on clinical course. Discussed with Dr. Roche and MICU.    Agree with H&P as outlined above, edited where appropriate.

## 2023-10-01 NOTE — H&P ADULT - NSICDXFAMILYHX_GEN_ALL_CORE_FT
FAMILY HISTORY:  Mother  Still living? Unknown  Family history of insulin dependent diabetes mellitus, Age at diagnosis: Age Unknown

## 2023-10-01 NOTE — ED ADULT NURSE NOTE - NSFALLUNIVINTERV_ED_ALL_ED
Bed/Stretcher in lowest position, wheels locked, appropriate side rails in place/Call bell, personal items and telephone in reach/Instruct patient to call for assistance before getting out of bed/chair/stretcher/Non-slip footwear applied when patient is off stretcher/[place] to call system/Physically safe environment - no spills, clutter or unnecessary equipment/Purposeful proactive rounding/Room/bathroom lighting operational, light cord in reach

## 2023-10-01 NOTE — H&P ADULT - PROBLEM SELECTOR PLAN 4
Suspect congestive hepatopathy  Hold statin in setting of transaminitis  Continue diuretics and trend CMP BLCr ~4, has been progressively worsening with Cr 5.7 in 9/2023  - Continue IV diuretics per renal and ICU  - Assess for possible hemodialysis - no urgent dialytic indication at this time  - patient still produces urine  - Nephrology consulted Dr. Deleon ([facility])

## 2023-10-01 NOTE — PATIENT PROFILE ADULT - FALL HARM RISK - HARM RISK INTERVENTIONS

## 2023-10-01 NOTE — H&P ADULT - PROBLEM SELECTOR PLAN 8
Hold statin in setting of transaminitis Continue home coreg and amlodipine  Monitor hemodynamics Continue home coreg 3.125mg BID + amlodipine 10mg/d Chronic, hypertensive on admission  Continue home coreg 3.125mg BID + amlodipine 10mg/d

## 2023-10-01 NOTE — H&P ADULT - NSHPOUTPATIENTPROVIDERS_GEN_ALL_CORE
PCP Corin Fish  Nephrology Isabella Lares  Cardiology Sheldon Ornelas  Endocrine Renata Ruiz PCP Corin Fish  Nephrology Isabella Lares  Endo Dr. Ruiz  Cardiology Sheldon Ornelas  Endocrine Renata Ruiz

## 2023-10-01 NOTE — ED PROVIDER NOTE - DIFFERENTIAL DIAGNOSIS
Differential including but not limited to MI CHF fluid overload renal failure reactive airway electrolyte abnormality anemia hypoxemia hypercarb Differential Diagnosis

## 2023-10-01 NOTE — PROGRESS NOTE ADULT - NUTRITIONAL ASSESSMENT
Chart and labs reviewed, discussed with dr Ramos. GABI on advanced CKD associated with resp insufficiency due to either PNA vs pulm edema. Outpt nephrologist is Dr Davis.   Arrangements are made to transfer pt to [facility]. No urgent dialytic indications. Start Lasix 80 mg q8-12h vs Lasix gtt. Will evaluate pt for need of HD in am.   Full consult to follow

## 2023-10-01 NOTE — H&P ADULT - NSHPLABSRESULTS_GEN_ALL_CORE
Labs:                      7.0    13.60 )-----------( 265      ( 01 Oct 2023 18:23 )             23.2     01 Oct 2023 18:23    133    |  97     |  112    ----------------------------<  164    5.0     |  22     |  5.99     Ca    7.2        01 Oct 2023 18:23    TPro  6.6    /  Alb  2.6    /  TBili  0.4    /  DBili  x      /  AST  195    /  ALT  239    /  AlkPhos  63     01 Oct 2023 18:23    LIVER FUNCTIONS - ( 01 Oct 2023 18:23 )  Alb: 2.6 g/dL / Pro: 6.6 g/dL / ALK PHOS: 63 U/L / ALT: 239 U/L / AST: 195 U/L / GGT: x           PT/INR - ( 01 Oct 2023 18:23 )   PT: 16.0 sec;   INR: 1.48 ratio         PTT - ( 01 Oct 2023 18:23 )  PTT:31.4 sec  CAPILLARY BLOOD GLUCOSE    Urinalysis Basic - ( 01 Oct 2023 18:23 )    Color: x / Appearance: x / SG: x / pH: x  Gluc: 164 mg/dL / Ketone: x  / Bili: x / Urobili: x   Blood: x / Protein: x / Nitrite: x   Leuk Esterase: x / RBC: x / WBC x   Sq Epi: x / Non Sq Epi: x / Bacteria: x    Radiology:    < from: Xray Chest 1 View- PORTABLE-Urgent (10.01.23 @ 18:43) >    FINDINGS:    The heart appears enlarged, but may be magnified by technique.  There are asymmetric patchy opacities in the right mid and lower lung and   left lung base which are new from the prior study.  There are no pleural effusions.  There is no pneumothorax.    IMPRESSION:    Bilateral patchy opacities right greater than left which may represent   multifocal pneumonia or asymmetric pulmonary edema    < end of copied text >    Cardiology:  EKG: NSR at 96bpm    Labs, Imaging and EKG all personally reviewed by the attending physician.

## 2023-10-01 NOTE — ED PROVIDER NOTE - OBJECTIVE STATEMENT
Patient is a 43-year-old male with a history of end-stage kidney disease pending dialysis.  Presents to the emergency room at [facility] earlier today with respiratory distress.  Found to be in congestive heart failure placed on BiPAP.  Empiric antibiotic therapy for presumptive pneumonia was also started.  Nephrology Dr. Deleon was consulted by the sending physician from [place] emergency department and recommended patient be transferred for dialysis.  Unable to transfer the patient to [facility] all tertiary receiving facilities, it was recommended the patient come to [facility] where dialysis is also available.  ICU was consulted.  Internal medicine was consulted and are expecting the patient to arrive at this facility.  Brought in by emergency ambulance on BiPAP.  The settings of the BiPAP are 10 over 5 at 60% patient is tolerating well without normal oxygenation.  No desaturations were reported by EMS.

## 2023-10-01 NOTE — ED ADULT NURSE NOTE - NS_NURSE_RECEIVING_PHYS_ED_ALL_ED_FT
Patient encountered sitting in PST waiting area, agreeable to PT pre-op evaluation. Patient is for elective right total knee arthroplasty at a later date from now.
Garret

## 2023-10-01 NOTE — ED PROVIDER NOTE - CONSTITUTIONAL, MLM
normal... Well appearing, awake, alert, oriented to person, place, time/situation and in no apparent distress. on bipap comfortable tachypenic

## 2023-10-01 NOTE — ED ADULT NURSE NOTE - OBJECTIVE STATEMENT
Pt transferred from [facility] via ambulance for possible dialysis.  Pt received on bipap for resp. distress; pt appears to be comfortable at this time.  Denies any chest pain.  NSR on cardiac monitor, no cardiac events noted.  Pt tolerating bipap well.  No n/v/d.  +2 BLE extremity noted.  Swelling noted to BUE as well.  Heplock to left AC #20- flushes well.  Maintain comfort and safety.

## 2023-10-01 NOTE — H&P ADULT - PROBLEM SELECTOR PLAN 6
Chronic, uncontrolled  Hold PO home medications  Continue basal insulin (on semglee at home) with lantus  Continue moderate dose insulin corrective scale  Check A1C Hgb has been trending down from ~10 in Feb to 8 in September  - Suspect anemia of CKD  - Check iron studies, b12, folate, FOBT, T&S  - No signs/sx of active bleeding at this time  -Transfuse as needed

## 2023-10-01 NOTE — H&P ADULT - NSHPPHYSICALEXAM_GEN_ALL_CORE
T(C): 36.5 (10-01-23 @ 20:47), Max: 36.5 (10-01-23 @ 20:47)  HR: 87 (10-01-23 @ 21:10) (87 - 97)  BP: 149/88 (10-01-23 @ 21:10) (149/88 - 164/96)  RR: 20 (10-01-23 @ 20:47) (20 - 20)  SpO2: 98% (10-01-23 @ 20:47) (97% - 98%)    GENERAL: NAD  EYES: sclera clear, no exudates  LUNGS: on BIPAP, rales b/l, no wheezing, no respiratory distress, no accessory muscle use  HEART: s1s2 rrrr, + LE edema  GASTROINTESTINAL: abdomen is distended, non tender  MUSCULOSKELETAL: no clubbing or cyanosis, no obvious deformity  NEUROLOGIC: awake, alert, oriented x3  HEME/LYMPH: no obvious ecchymosis or petechiae T(C): 36.5 (10-01-23 @ 20:47), Max: 36.5 (10-01-23 @ 20:47)  HR: 87 (10-01-23 @ 21:10) (87 - 97)  BP: 149/88 (10-01-23 @ 21:10) (149/88 - 164/96)  RR: 20 (10-01-23 @ 20:47) (20 - 20)  SpO2: 98% (10-01-23 @ 20:47) (97% - 98%)    GENERAL: NAD  EYES: sclera clear, no exudates  LUNGS: on BIPAP, rales b/l, no wheezing, no respiratory distress, no accessory muscle use  HEART: s1s2 rrrr, + LE edema  GASTROINTESTINAL: abdomen is distended, non tender  MUSCULOSKELETAL: no clubbing or cyanosis, no obvious deformity  NEUROLOGIC: awake, alert, oriented x3  HEME/LYMPH: no obvious ecchymosis or petechiae  PSYCH: normal affect

## 2023-10-01 NOTE — H&P ADULT - PROBLEM SELECTOR PLAN 9
VTE prophylaxis: subcutaneous unfractionated heparin Hold statin in setting of transaminitis Hold statin in setting of transaminitis and elevated CK

## 2023-10-01 NOTE — ED PROVIDER NOTE - CONSTITUTIONAL, MLM
Well appearing, awake, alert, oriented to person, place, time/situation and in respiratory distress. normal...

## 2023-10-01 NOTE — CONSULT NOTE ADULT - SUBJECTIVE AND OBJECTIVE BOX
Patient is a 43y old  Male who presents with a chief complaint of Acute hypoxic respiratory failure (01 Oct 2023 20:42)      BRIEF HOSPITAL COURSE: Pt is a 42 y/o M pmhx of DM2 on insulin w/ diabetic retinopathy/neuropahy, CKD stage IV on transplant list, HTN, HLD, HFpEF on torsemide who presented to  ED for SOB. Was placed on BiPAP in ED w/ improvement, transferred to [facility] for possible HD.       PAST MEDICAL & SURGICAL HISTORY:  Insulin dependent type 2 diabetes mellitus      Stage 4 chronic kidney disease      HTN (hypertension)      HLD (hyperlipidemia)      History of diabetic retinopathy      History of incision and drainage          Review of Systems:  Unable to assess secondary to Respiratory distress on BiPAP.       Medications:            heparin   Injectable 5000 Unit(s) SubCutaneous every 8 hours    pantoprazole  Injectable 40 milliGRAM(s) IV Push daily            chlorhexidine 2% Cloths 1 Application(s) Topical <User Schedule>            ICU Vital Signs Last 24 Hrs  T(C): 36.5 (01 Oct 2023 20:47), Max: 36.5 (01 Oct 2023 20:47)  T(F): 97.7 (01 Oct 2023 20:47), Max: 97.7 (01 Oct 2023 20:47)  HR: 87 (01 Oct 2023 21:10) (87 - 97)  BP: 149/88 (01 Oct 2023 21:10) (149/88 - 164/96)  BP(mean): --  ABP: --  ABP(mean): --  RR: 20 (01 Oct 2023 20:47) (20 - 20)  SpO2: 98% (01 Oct 2023 20:47) (97% - 98%)    O2 Parameters below as of 01 Oct 2023 20:47  Patient On (Oxygen Delivery Method): BiPAP/CPAP                I&O's Detail        LABS:                        7.5    12.97 )-----------( 272      ( 01 Oct 2023 21:06 )             24.1                 CAPILLARY BLOOD GLUCOSE            CULTURES:      Physical Examination:    General: Pt laying in hospital bed in no acute distress.  Alert, oriented, interactive, nonfocal    HEENT: Pupils equal, reactive to light.  Symmetric.    PULM: Rhonchi to auscultation bilaterally, no significant sputum production, on BiPAP     CVS: Regular rate and rhythm, no murmurs, rubs, or gallops    ABD: Soft, nondistended, nontender, normoactive bowel sounds, no masses    EXT: No edema, nontender    SKIN: Warm and well perfused.     NEURO: Awake and alert, moving all extremities well       RADIOLOGY:       CRITICAL CARE TIME SPENT: 36 minutes assessing presenting problems of acute illness, which pose high probability of life threatening deterioration or end organ damage/dysfunction, as well as medical decision making including initiating plan of care, reviewing data, reviewing radiologic exams, discussing with multidisciplinary team,  discussing goals of care with patient/family, and writing this note.  Non-inclusive of procedures performed,

## 2023-10-01 NOTE — ED ADULT NURSE NOTE - NSFALLHARMRISKINTERV_ED_ALL_ED

## 2023-10-01 NOTE — H&P ADULT - TIME BILLING
activities including direct patient care, counseling and/or coordinating care, reviewing notes/lab data/imaging, thorough review of outpatient clincial course and discussion with multidisciplinary team (excluding time spent on resident teaching)

## 2023-10-01 NOTE — CONSULT NOTE ADULT - ASSESSMENT
Pt is a 44 y/o M pmhx of DM2 on insulin w/ diabetic retinopathy/neuropahy, CKD stage IV on transplant list, HTN, HLD, HFpEF on torsemide who presented to  ED for SOB. Was placed on BiPAP in ED w/ improvement, transferred to [facility] for possible HD.     1. Acute hypoxic respiratory failure   2. GABI on CKD   3. Renal failure?   4. HFpEF   5. Pulmonary edema   6. Transaminitis     Plan:   Neuro:  CV:  Pulm:  GI:  Renal:  Endo:  Heme:  ID:  Pt is a 42 y/o M pmhx of DM2 on insulin w/ diabetic retinopathy/neuropahy, CKD stage IV on transplant list, HTN, HLD, HFpEF on torsemide who presented to  ED for SOB. Was placed on BiPAP in ED w/ improvement, transferred to [facility] for possible HD.     1. Acute hypoxic respiratory failure   2. GABI on CKD   3. Renal failure?   4. HFpEF   5. Pulmonary edema   6. Transaminitis     Plan:   Neuro: Awake and alert at baseline, avoid sedating medications.     CV: HFpEF, suspected volume overload, diuresed w/ 60 IVP lasix in ED. Continue to monitor and maintain MAP>65.     Pulm: Acute hypoxic respiratory failure secondary to pulmonary edema in volume overloaded state, on BiPAP for respiratory support and Positive pressure ventilation.     GI: Diet: NPO while on BiPAP.     Renal: GABI on CKD, volume overloaded, diuresed w/ 60 IVP lasix, may need eventual dialysis for volume overload.     Endo: Glucose<180, mag>2, K>4 for arrythmia suppression.     Heme: Heparin for DVT PPX     ID: Possible sepsis component to GABI, cultures sent, started on zosyn, will follow cultures and narrow as indicated, trend markers of infection daily.

## 2023-10-01 NOTE — ED PROVIDER NOTE - NSICDXPASTMEDICALHX_GEN_ALL_CORE_FT
PAST MEDICAL HISTORY:  Type 2 diabetes mellitus with complication, without long-term current use of insulin

## 2023-10-01 NOTE — H&P ADULT - PROBLEM SELECTOR PLAN 1
Admit to ICU  Requiring NIPPV with BiLevel PAP - continue BIPAP and O2 as needed  Got 40mg IV lasix in [facility] ER + 60mg IV lasix in [facility] ER  Nephrology recs Lasix 80 mg q8-12h vs Lasix gtt and assess for HD in AM  Will trial diuretics + NIPPV overnight  Possible PNA - continue rocephin/azithro & follow up cultures, check PCT, urinary ag  Check 2D ECHO  Cardiology consult Hypoxic on presentation requiring NIPPV with BiLevel PAP   - CXR: b/l patchy opacities R>L; multifocal pneumonia vs asymmetric pulmonary edema  - elevated proBNP 21k; covid negative  - Recent travel to [facility] [place], and [place] with longest flight being 18 hours; has had subjective fevers, cough, and sob since returning 4d ago  - AHRF likely 2/2 PNA vs acute pulmonary edema from CHF/GABI on CKD  - Got 40mg IV lasix in [facility] ER + 60mg IV lasix in [facility] ER  - Nephrology recs Lasix 80 mg q8-12h vs Lasix gtt and assess for HD in AM  - Will trial diuretics + NIPPV overnight while in ICU  - continue IV antibx rocephin/azithro for possible PNA & follow up cultures  -  check PCT, urinary ag, RVP, quanteferon TB  - Check 2D ECHO, LE dopplers to r/o DVT given recent long flight  - F/u ID and Cardiology consults Hypoxic on presentation requiring NIPPV with BiLevel PAP   - CXR: b/l patchy opacities R>L; multifocal pneumonia vs asymmetric pulmonary edema  - elevated proBNP 21k; covid negative  - Recent travel to [facility], [facility], and [place] with longest flight being 18 hours; has had subjective fevers, cough, and sob since returning 4d ago  - AHRF likely 2/2 PNA vs acute pulmonary edema from CHF/GABI on CKD vs possible PE  - Got  IV lasix 40mg [facility] ER + 60mg [facility] ER  - Nephrology recs Lasix 80 mg q8-12h vs Lasix gtt and assess for HD in AM  - trial diuretics + NIPPV overnight while in ICU  - continue IV antibx rocephin/azithro for possible PNA & follow up culture data  -  check d-dimer, PCT, urinary ag, RVP, quanteferon TB  - Check 2D ECHO, LE dopplers to r/o DVT given recent long flight  - F/u ID and Cardiology consults Hypoxic on presentation requiring NIPPV with BiLevel PAP   - CXR: b/l patchy opacities R>L; multifocal pneumonia vs asymmetric pulmonary edema  - elevated proBNP 21k; covid negative  - Recent travel to [facility], [facility], and [place] with longest flight being 18 hours; has had subjective fevers, cough, and sob since returning 4d ago  - AHRF likely 2/2 PNA vs acute pulmonary edema from CHF/GABI on CKD vs possible PE  - Got  IV lasix 40mg [facility] ER + 60mg [facility] ER  - Nephrology recs IV Lasix 80 mg q8-12h vs Lasix gtt and assess for HD in AM  - trial diuretics + NIPPV overnight while in ICU  - continue IV antibx rocephin/azithro for possible PNA & follow up culture data  - check d-dimer, PCT, urinary ag, RVP, quanteferon TB  - Check 2D ECHO, LE dopplers to r/o DVT given recent long flight  - F/u ID and Cardiology consults

## 2023-10-01 NOTE — H&P ADULT - PROBLEM SELECTOR PLAN 5
Hgb has been trending down from ~10 in Feb to 8 in September  Suspect anemia of CKD  Check iron studies, b12, folate, FOBT, T&S  Transfuse as needed Suspect congestive hepatopathy  - Hold statin in setting of transaminitis  - Continue diuretics and trend CMP  - avoid hepatotoxic medications Suspect congestive hepatopathy vs statin induced myopathy  - Hold statin in setting of transaminitis and elevated CK  - Continue diuretics and trend CMP  - avoid hepatotoxic medications

## 2023-10-02 ENCOUNTER — APPOINTMENT (OUTPATIENT)
Dept: INTERNAL MEDICINE | Facility: CLINIC | Age: 43
End: 2023-10-02

## 2023-10-02 ENCOUNTER — TRANSCRIPTION ENCOUNTER (OUTPATIENT)
Age: 43
End: 2023-10-02

## 2023-10-02 LAB
ALBUMIN SERPL ELPH-MCNC: 2.2 G/DL — LOW (ref 3.3–5)
ALBUMIN SERPL ELPH-MCNC: 2.3 G/DL — LOW (ref 3.3–5)
ALP SERPL-CCNC: 61 U/L — SIGNIFICANT CHANGE UP (ref 40–120)
ALT FLD-CCNC: 229 U/L — HIGH (ref 12–78)
ANION GAP SERPL CALC-SCNC: 13 MMOL/L — SIGNIFICANT CHANGE UP (ref 5–17)
ANION GAP SERPL CALC-SCNC: 14 MMOL/L — SIGNIFICANT CHANGE UP (ref 5–17)
AST SERPL-CCNC: 113 U/L — HIGH (ref 15–37)
BILIRUB SERPL-MCNC: 0.3 MG/DL — SIGNIFICANT CHANGE UP (ref 0.2–1.2)
BUN SERPL-MCNC: 111 MG/DL — HIGH (ref 7–23)
BUN SERPL-MCNC: 112 MG/DL — HIGH (ref 7–23)
CALCIUM SERPL-MCNC: 6.7 MG/DL — LOW (ref 8.5–10.1)
CALCIUM SERPL-MCNC: 6.7 MG/DL — LOW (ref 8.5–10.1)
CALCIUM SERPL-MCNC: 6.8 MG/DL — LOW (ref 8.5–10.1)
CHLORIDE SERPL-SCNC: 101 MMOL/L — SIGNIFICANT CHANGE UP (ref 96–108)
CHLORIDE SERPL-SCNC: 102 MMOL/L — SIGNIFICANT CHANGE UP (ref 96–108)
CK MB BLD-MCNC: 0.5 % — SIGNIFICANT CHANGE UP (ref 0–3.5)
CK MB CFR SERPL CALC: 9.6 NG/ML — HIGH (ref 0–3.6)
CK SERPL-CCNC: 1768 U/L — HIGH (ref 26–308)
CO2 SERPL-SCNC: 21 MMOL/L — LOW (ref 22–31)
CO2 SERPL-SCNC: 22 MMOL/L — SIGNIFICANT CHANGE UP (ref 22–31)
CREAT SERPL-MCNC: 6.1 MG/DL — HIGH (ref 0.5–1.3)
CREAT SERPL-MCNC: 6.2 MG/DL — HIGH (ref 0.5–1.3)
D DIMER BLD IA.RAPID-MCNC: 2712 NG/ML DDU — HIGH
EGFR: 11 ML/MIN/1.73M2 — LOW
EGFR: 11 ML/MIN/1.73M2 — LOW
GLUCOSE SERPL-MCNC: 205 MG/DL — HIGH (ref 70–99)
GLUCOSE SERPL-MCNC: 232 MG/DL — HIGH (ref 70–99)
HCT VFR BLD CALC: 22.2 % — LOW (ref 39–50)
HGB BLD-MCNC: 7 G/DL — CRITICAL LOW (ref 13–17)
LEGIONELLA AG UR QL: NEGATIVE — SIGNIFICANT CHANGE UP
MAGNESIUM SERPL-MCNC: 1.9 MG/DL — SIGNIFICANT CHANGE UP (ref 1.6–2.6)
MAGNESIUM SERPL-MCNC: 1.9 MG/DL — SIGNIFICANT CHANGE UP (ref 1.6–2.6)
MCHC RBC-ENTMCNC: 23.3 PG — LOW (ref 27–34)
MCHC RBC-ENTMCNC: 31.5 GM/DL — LOW (ref 32–36)
MCV RBC AUTO: 74 FL — LOW (ref 80–100)
MRSA PCR RESULT.: SIGNIFICANT CHANGE UP
NRBC # BLD: 0 /100 WBCS — SIGNIFICANT CHANGE UP (ref 0–0)
PHOSPHATE SERPL-MCNC: 8 MG/DL — HIGH (ref 2.5–4.5)
PHOSPHATE SERPL-MCNC: 8.3 MG/DL — HIGH (ref 2.5–4.5)
PLATELET # BLD AUTO: 252 K/UL — SIGNIFICANT CHANGE UP (ref 150–400)
POTASSIUM SERPL-MCNC: 4.2 MMOL/L — SIGNIFICANT CHANGE UP (ref 3.5–5.3)
POTASSIUM SERPL-MCNC: 4.3 MMOL/L — SIGNIFICANT CHANGE UP (ref 3.5–5.3)
POTASSIUM SERPL-SCNC: 4.2 MMOL/L — SIGNIFICANT CHANGE UP (ref 3.5–5.3)
POTASSIUM SERPL-SCNC: 4.3 MMOL/L — SIGNIFICANT CHANGE UP (ref 3.5–5.3)
PROCALCITONIN SERPL-MCNC: 1.22 NG/ML — HIGH
PROT SERPL-MCNC: 6.3 G/DL — SIGNIFICANT CHANGE UP (ref 6–8.3)
RAPID RVP RESULT: DETECTED
RBC # BLD: 3 M/UL — LOW (ref 4.2–5.8)
RBC # FLD: 15.5 % — HIGH (ref 10.3–14.5)
RV+EV RNA SPEC QL NAA+PROBE: DETECTED
S AUREUS DNA NOSE QL NAA+PROBE: SIGNIFICANT CHANGE UP
S PNEUM AG UR QL: NEGATIVE — SIGNIFICANT CHANGE UP
SARS-COV-2 RNA SPEC QL NAA+PROBE: SIGNIFICANT CHANGE UP
SODIUM SERPL-SCNC: 136 MMOL/L — SIGNIFICANT CHANGE UP (ref 135–145)
SODIUM SERPL-SCNC: 137 MMOL/L — SIGNIFICANT CHANGE UP (ref 135–145)
TROPONIN I, HIGH SENSITIVITY RESULT: 76.7 NG/L — SIGNIFICANT CHANGE UP
WBC # BLD: 11.75 K/UL — HIGH (ref 3.8–10.5)
WBC # FLD AUTO: 11.75 K/UL — HIGH (ref 3.8–10.5)

## 2023-10-02 PROCEDURE — 99232 SBSQ HOSP IP/OBS MODERATE 35: CPT

## 2023-10-02 PROCEDURE — 76604 US EXAM CHEST: CPT | Mod: 26,GC

## 2023-10-02 PROCEDURE — 93306 TTE W/DOPPLER COMPLETE: CPT | Mod: 26

## 2023-10-02 PROCEDURE — 99291 CRITICAL CARE FIRST HOUR: CPT | Mod: GC

## 2023-10-02 PROCEDURE — 93970 EXTREMITY STUDY: CPT | Mod: 26

## 2023-10-02 RX ORDER — CALCIUM GLUCONATE 100 MG/ML
2 VIAL (ML) INTRAVENOUS ONCE
Refills: 0 | Status: COMPLETED | OUTPATIENT
Start: 2023-10-02 | End: 2023-10-02

## 2023-10-02 RX ORDER — DEXTROSE 50 % IN WATER 50 %
25 SYRINGE (ML) INTRAVENOUS ONCE
Refills: 0 | Status: DISCONTINUED | OUTPATIENT
Start: 2023-10-02 | End: 2023-10-05

## 2023-10-02 RX ORDER — GLUCAGON INJECTION, SOLUTION 0.5 MG/.1ML
1 INJECTION, SOLUTION SUBCUTANEOUS ONCE
Refills: 0 | Status: DISCONTINUED | OUTPATIENT
Start: 2023-10-02 | End: 2023-10-05

## 2023-10-02 RX ORDER — SODIUM CHLORIDE 9 MG/ML
1000 INJECTION, SOLUTION INTRAVENOUS
Refills: 0 | Status: DISCONTINUED | OUTPATIENT
Start: 2023-10-02 | End: 2023-10-05

## 2023-10-02 RX ORDER — INSULIN LISPRO 100/ML
VIAL (ML) SUBCUTANEOUS EVERY 6 HOURS
Refills: 0 | Status: DISCONTINUED | OUTPATIENT
Start: 2023-10-02 | End: 2023-10-05

## 2023-10-02 RX ORDER — SODIUM BICARBONATE 1 MEQ/ML
650 SYRINGE (ML) INTRAVENOUS
Refills: 0 | Status: DISCONTINUED | OUTPATIENT
Start: 2023-10-02 | End: 2023-10-05

## 2023-10-02 RX ORDER — CALCIUM ACETATE 667 MG
1334 TABLET ORAL
Refills: 0 | Status: DISCONTINUED | OUTPATIENT
Start: 2023-10-02 | End: 2023-10-05

## 2023-10-02 RX ORDER — ERYTHROPOIETIN 10000 [IU]/ML
10000 INJECTION, SOLUTION INTRAVENOUS; SUBCUTANEOUS
Refills: 0 | Status: DISCONTINUED | OUTPATIENT
Start: 2023-10-02 | End: 2023-10-05

## 2023-10-02 RX ORDER — INSULIN GLARGINE 100 [IU]/ML
5 INJECTION, SOLUTION SUBCUTANEOUS AT BEDTIME
Refills: 0 | Status: DISCONTINUED | OUTPATIENT
Start: 2023-10-02 | End: 2023-10-03

## 2023-10-02 RX ORDER — DEXTROSE 50 % IN WATER 50 %
12.5 SYRINGE (ML) INTRAVENOUS ONCE
Refills: 0 | Status: DISCONTINUED | OUTPATIENT
Start: 2023-10-02 | End: 2023-10-05

## 2023-10-02 RX ORDER — LABETALOL HCL 100 MG
200 TABLET ORAL EVERY 8 HOURS
Refills: 0 | Status: DISCONTINUED | OUTPATIENT
Start: 2023-10-02 | End: 2023-10-03

## 2023-10-02 RX ORDER — FUROSEMIDE 40 MG
5 TABLET ORAL
Qty: 500 | Refills: 0 | Status: DISCONTINUED | OUTPATIENT
Start: 2023-10-02 | End: 2023-10-04

## 2023-10-02 RX ORDER — DEXTROSE 50 % IN WATER 50 %
15 SYRINGE (ML) INTRAVENOUS ONCE
Refills: 0 | Status: DISCONTINUED | OUTPATIENT
Start: 2023-10-02 | End: 2023-10-05

## 2023-10-02 RX ORDER — INSULIN LISPRO 100/ML
VIAL (ML) SUBCUTANEOUS
Refills: 0 | Status: DISCONTINUED | OUTPATIENT
Start: 2023-10-02 | End: 2023-10-02

## 2023-10-02 RX ADMIN — Medication 200 MILLIGRAM(S): at 22:11

## 2023-10-02 RX ADMIN — Medication 650 MILLIGRAM(S): at 17:49

## 2023-10-02 RX ADMIN — Medication 2: at 17:50

## 2023-10-02 RX ADMIN — Medication 2: at 05:48

## 2023-10-02 RX ADMIN — Medication 2: at 11:43

## 2023-10-02 RX ADMIN — Medication 200 GRAM(S): at 17:49

## 2023-10-02 RX ADMIN — Medication 200 MILLIGRAM(S): at 12:46

## 2023-10-02 RX ADMIN — ERYTHROPOIETIN 10000 UNIT(S): 10000 INJECTION, SOLUTION INTRAVENOUS; SUBCUTANEOUS at 12:47

## 2023-10-02 RX ADMIN — Medication 2: at 23:41

## 2023-10-02 RX ADMIN — HEPARIN SODIUM 5000 UNIT(S): 5000 INJECTION INTRAVENOUS; SUBCUTANEOUS at 13:08

## 2023-10-02 RX ADMIN — Medication 1334 MILLIGRAM(S): at 17:49

## 2023-10-02 RX ADMIN — INSULIN GLARGINE 5 UNIT(S): 100 INJECTION, SOLUTION SUBCUTANEOUS at 23:30

## 2023-10-02 RX ADMIN — Medication 1334 MILLIGRAM(S): at 12:46

## 2023-10-02 RX ADMIN — HEPARIN SODIUM 5000 UNIT(S): 5000 INJECTION INTRAVENOUS; SUBCUTANEOUS at 22:11

## 2023-10-02 RX ADMIN — CHLORHEXIDINE GLUCONATE 1 APPLICATION(S): 213 SOLUTION TOPICAL at 05:03

## 2023-10-02 RX ADMIN — Medication 5 MG/HR: at 05:04

## 2023-10-02 RX ADMIN — PIPERACILLIN AND TAZOBACTAM 25 GRAM(S): 4; .5 INJECTION, POWDER, LYOPHILIZED, FOR SOLUTION INTRAVENOUS at 09:18

## 2023-10-02 RX ADMIN — HEPARIN SODIUM 5000 UNIT(S): 5000 INJECTION INTRAVENOUS; SUBCUTANEOUS at 05:03

## 2023-10-02 NOTE — DISCHARGE NOTE PROVIDER - PROVIDER TOKENS
PROVIDER:[TOKEN:[[id number]],FOLLOWUP:[1 week],ESTABLISHEDPATIENT:[T]],PROVIDER:[TOKEN:[[id number]],FOLLOWUP:[1 week],ESTABLISHEDPATIENT:[T]],PROVIDER:[[id number]],FOLLOWUP:[1 week],ESTABLISHEDPATIENT:[T]]

## 2023-10-02 NOTE — DIETITIAN INITIAL EVALUATION ADULT - PROBLEM SELECTOR PLAN 7
Chronic, uncontrolled  - Hold home januvia  - Continue basal insulin  - Continue ISS while inpatient   - Check A1C

## 2023-10-02 NOTE — PROGRESS NOTE ADULT - SUBJECTIVE AND OBJECTIVE BOX
INTERVAL HPI/OVERNIGHT EVENTS:    SUBJECTIVE: Patient seen and examined at bedside.     ROS:  CV: Denies chest pain  Resp: Denies SOB  GI: Denies abdominal pain, constipation, diarrhea, nausea, vomiting  : Denies dysuria  ID: Denies fevers, chills  MSK: Denies joint pain     OBJECTIVE:    VITAL SIGNS:  ICU Vital Signs Last 24 Hrs  T(C): 36.7 (02 Oct 2023 12:09), Max: 36.7 (02 Oct 2023 04:14)  T(F): 98 (02 Oct 2023 12:09), Max: 98 (02 Oct 2023 04:14)  HR: 82 (02 Oct 2023 14:00) (82 - 101)  BP: 132/75 (02 Oct 2023 14:00) (129/78 - 173/95)  BP(mean): 97 (02 Oct 2023 14:00) (97 - 129)  ABP: --  ABP(mean): --  RR: 18 (02 Oct 2023 14:00) (13 - 22)  SpO2: 98% (02 Oct 2023 14:00) (94% - 100%)    O2 Parameters below as of 02 Oct 2023 08:00  Patient On (Oxygen Delivery Method): BiPAP/CPAP    O2 Concentration (%): 40          10-01 @ 07:01  -  10-02 @ 07:00  --------------------------------------------------------  IN: 115 mL / OUT: 1500 mL / NET: -1385 mL    10-02 @ 07:01  -  10-02 @ 14:51  --------------------------------------------------------  IN: 135 mL / OUT: 674 mL / NET: -539 mL      CAPILLARY BLOOD GLUCOSE      POCT Blood Glucose.: 212 mg/dL (02 Oct 2023 11:42)      PHYSICAL EXAM:  General: NAD, comfortable  HEENT: NCAT, clear conjunctiva, no scleral icterus  Respiratory: CTA b/l, no wheezing, rhonchi, rales  Cardiovascular: RRR, normal S1S2, no M/R/G  Abdomen: soft, NT/ND, bowel sounds present  Extremities: no LE edema or calf tenderness  Neurology: awake and alert    MEDICATIONS:  MEDICATIONS  (STANDING):  calcium acetate 1334 milliGRAM(s) Oral three times a day with meals  chlorhexidine 2% Cloths 1 Application(s) Topical <User Schedule>  dextrose 5%. 1000 milliLiter(s) (100 mL/Hr) IV Continuous <Continuous>  dextrose 5%. 1000 milliLiter(s) (50 mL/Hr) IV Continuous <Continuous>  dextrose 50% Injectable 12.5 Gram(s) IV Push once  dextrose 50% Injectable 25 Gram(s) IV Push once  dextrose 50% Injectable 25 Gram(s) IV Push once  epoetin zulay (PROCRIT) Injectable 43551 Unit(s) SubCutaneous <User Schedule>  furosemide Infusion 10 mG/Hr (5 mL/Hr) IV Continuous <Continuous>  glucagon  Injectable 1 milliGRAM(s) IntraMuscular once  heparin   Injectable 5000 Unit(s) SubCutaneous every 8 hours  influenza   Vaccine 0.5 milliLiter(s) IntraMuscular once  insulin glargine Injectable (LANTUS) 5 Unit(s) SubCutaneous at bedtime  insulin lispro (ADMELOG) corrective regimen sliding scale   SubCutaneous every 6 hours  labetalol 200 milliGRAM(s) Oral every 8 hours  sodium bicarbonate 650 milliGRAM(s) Oral two times a day    MEDICATIONS  (PRN):  dextrose Oral Gel 15 Gram(s) Oral once PRN Blood Glucose LESS THAN 70 milliGRAM(s)/deciliter      ALLERGIES:  Allergies    No Known Allergies    Intolerances        LABS:                        7.0    11.75 )-----------( 252      ( 02 Oct 2023 05:55 )             22.2     10-02    136  |  101  |  111<H>  ----------------------------<  205<H>  4.3   |  21<L>  |  6.10<H>    Ca    6.7<L>      02 Oct 2023 05:55  Phos  8.3     10-02  Mg     1.9     10-02        Urinalysis Basic - ( 02 Oct 2023 05:55 )    Color: x / Appearance: x / SG: x / pH: x  Gluc: 205 mg/dL / Ketone: x  / Bili: x / Urobili: x   Blood: x / Protein: x / Nitrite: x   Leuk Esterase: x / RBC: x / WBC x   Sq Epi: x / Non Sq Epi: x / Bacteria: x        RADIOLOGY & ADDITIONAL TESTS:       CENTRAL LINE: N        DATE INSERTED:             REMOVE: N  DOLAN: Y                       DATE INSERTED:              REMOVE: Y/N  A-LINE: N                       DATE INSERTED:              REMOVE: Y/N      GLOBAL ISSUE/BEST PRACTICE  Analgesia: Y  Sedation: Y  HOB elevation: yes  Stress ulcer prophylaxis: Y  VTE prophylaxis: Y  Glycemic control: Y  Nutrition: Y    CODE STATUS: Full Code INTERVAL HPI/OVERNIGHT EVENTS: no overnight events    SUBJECTIVE: Patient seen and examined at bedside. He is on BIPAP, ROS limited    ROS:  ROS limited 2/2 BIPAP    OBJECTIVE:    VITAL SIGNS:  ICU Vital Signs Last 24 Hrs  T(C): 36.7 (02 Oct 2023 12:09), Max: 36.7 (02 Oct 2023 04:14)  T(F): 98 (02 Oct 2023 12:09), Max: 98 (02 Oct 2023 04:14)  HR: 82 (02 Oct 2023 14:00) (82 - 101)  BP: 132/75 (02 Oct 2023 14:00) (129/78 - 173/95)  BP(mean): 97 (02 Oct 2023 14:00) (97 - 129)  ABP: --  ABP(mean): --  RR: 18 (02 Oct 2023 14:00) (13 - 22)  SpO2: 98% (02 Oct 2023 14:00) (94% - 100%)    O2 Parameters below as of 02 Oct 2023 08:00  Patient On (Oxygen Delivery Method): BiPAP/CPAP    O2 Concentration (%): 40          10-01 @ 07:01  -  10-02 @ 07:00  --------------------------------------------------------  IN: 115 mL / OUT: 1500 mL / NET: -1385 mL    10-02 @ 07:01  -  10-02 @ 14:51  --------------------------------------------------------  IN: 135 mL / OUT: 674 mL / NET: -539 mL      CAPILLARY BLOOD GLUCOSE      POCT Blood Glucose.: 212 mg/dL (02 Oct 2023 11:42)      PHYSICAL EXAM:  General: on BIPAP, appears uncomfortable  HEENT: NCAT, clear conjunctiva, no scleral icterus  Respiratory: CTA b/l, no wheezing, rhonchi, rales  Cardiovascular: RRR, normal S1S2, no M/R/G  Abdomen: soft, NT/ND, bowel sounds present  Extremities: no LE edema or calf tenderness  Neurology: awake and alert    MEDICATIONS:  MEDICATIONS  (STANDING):  calcium acetate 1334 milliGRAM(s) Oral three times a day with meals  chlorhexidine 2% Cloths 1 Application(s) Topical <User Schedule>  dextrose 5%. 1000 milliLiter(s) (100 mL/Hr) IV Continuous <Continuous>  dextrose 5%. 1000 milliLiter(s) (50 mL/Hr) IV Continuous <Continuous>  dextrose 50% Injectable 12.5 Gram(s) IV Push once  dextrose 50% Injectable 25 Gram(s) IV Push once  dextrose 50% Injectable 25 Gram(s) IV Push once  epoetin zulay (PROCRIT) Injectable 78085 Unit(s) SubCutaneous <User Schedule>  furosemide Infusion 10 mG/Hr (5 mL/Hr) IV Continuous <Continuous>  glucagon  Injectable 1 milliGRAM(s) IntraMuscular once  heparin   Injectable 5000 Unit(s) SubCutaneous every 8 hours  influenza   Vaccine 0.5 milliLiter(s) IntraMuscular once  insulin glargine Injectable (LANTUS) 5 Unit(s) SubCutaneous at bedtime  insulin lispro (ADMELOG) corrective regimen sliding scale   SubCutaneous every 6 hours  labetalol 200 milliGRAM(s) Oral every 8 hours  sodium bicarbonate 650 milliGRAM(s) Oral two times a day    MEDICATIONS  (PRN):  dextrose Oral Gel 15 Gram(s) Oral once PRN Blood Glucose LESS THAN 70 milliGRAM(s)/deciliter      ALLERGIES:  Allergies    No Known Allergies    Intolerances        LABS:                        7.0    11.75 )-----------( 252      ( 02 Oct 2023 05:55 )             22.2     10-02    136  |  101  |  111<H>  ----------------------------<  205<H>  4.3   |  21<L>  |  6.10<H>    Ca    6.7<L>      02 Oct 2023 05:55  Phos  8.3     10-02  Mg     1.9     10-02        Urinalysis Basic - ( 02 Oct 2023 05:55 )    Color: x / Appearance: x / SG: x / pH: x  Gluc: 205 mg/dL / Ketone: x  / Bili: x / Urobili: x   Blood: x / Protein: x / Nitrite: x   Leuk Esterase: x / RBC: x / WBC x   Sq Epi: x / Non Sq Epi: x / Bacteria: x        RADIOLOGY & ADDITIONAL TESTS:       CENTRAL LINE: N        DATE INSERTED:             REMOVE: N  DOLAN: Y                       DATE INSERTED:              REMOVE: Y/N  A-LINE: N                       DATE INSERTED:              REMOVE: Y/N      GLOBAL ISSUE/BEST PRACTICE  Analgesia: Y  Sedation: Y  HOB elevation: yes  Stress ulcer prophylaxis: Y  VTE prophylaxis: Y  Glycemic control: Y  Nutrition: Y    CODE STATUS: Full Code INTERVAL HPI/OVERNIGHT EVENTS: no overnight events    SUBJECTIVE: Patient seen and examined at bedside. He is on BIPAP, ROS limited    ROS:  ROS limited 2/2 BIPAP    OBJECTIVE:    VITAL SIGNS:  ICU Vital Signs Last 24 Hrs  T(C): 36.7 (02 Oct 2023 12:09), Max: 36.7 (02 Oct 2023 04:14)  T(F): 98 (02 Oct 2023 12:09), Max: 98 (02 Oct 2023 04:14)  HR: 82 (02 Oct 2023 14:00) (82 - 101)  BP: 132/75 (02 Oct 2023 14:00) (129/78 - 173/95)  BP(mean): 97 (02 Oct 2023 14:00) (97 - 129)  ABP: --  ABP(mean): --  RR: 18 (02 Oct 2023 14:00) (13 - 22)  SpO2: 98% (02 Oct 2023 14:00) (94% - 100%)    O2 Parameters below as of 02 Oct 2023 08:00  Patient On (Oxygen Delivery Method): BiPAP/CPAP    O2 Concentration (%): 40          10-01 @ 07:01  -  10-02 @ 07:00  --------------------------------------------------------  IN: 115 mL / OUT: 1500 mL / NET: -1385 mL    10-02 @ 07:01  -  10-02 @ 14:51  --------------------------------------------------------  IN: 135 mL / OUT: 674 mL / NET: -539 mL      CAPILLARY BLOOD GLUCOSE      POCT Blood Glucose.: 212 mg/dL (02 Oct 2023 11:42)      PHYSICAL EXAM:  General: on BIPAP, appears uncomfortable  HEENT: NCAT  Respiratory: diffuse rhonchi  Cardiovascular: RRR, normal S1S2, no M/R/G  Abdomen: soft, NT/ND  Extremities: 2+ pitting LE edema. no calf tenderness  Neurology: awake and alert    MEDICATIONS:  MEDICATIONS  (STANDING):  calcium acetate 1334 milliGRAM(s) Oral three times a day with meals  chlorhexidine 2% Cloths 1 Application(s) Topical <User Schedule>  dextrose 5%. 1000 milliLiter(s) (100 mL/Hr) IV Continuous <Continuous>  dextrose 5%. 1000 milliLiter(s) (50 mL/Hr) IV Continuous <Continuous>  dextrose 50% Injectable 12.5 Gram(s) IV Push once  dextrose 50% Injectable 25 Gram(s) IV Push once  dextrose 50% Injectable 25 Gram(s) IV Push once  epoetin zulay (PROCRIT) Injectable 27527 Unit(s) SubCutaneous <User Schedule>  furosemide Infusion 10 mG/Hr (5 mL/Hr) IV Continuous <Continuous>  glucagon  Injectable 1 milliGRAM(s) IntraMuscular once  heparin   Injectable 5000 Unit(s) SubCutaneous every 8 hours  influenza   Vaccine 0.5 milliLiter(s) IntraMuscular once  insulin glargine Injectable (LANTUS) 5 Unit(s) SubCutaneous at bedtime  insulin lispro (ADMELOG) corrective regimen sliding scale   SubCutaneous every 6 hours  labetalol 200 milliGRAM(s) Oral every 8 hours  sodium bicarbonate 650 milliGRAM(s) Oral two times a day    MEDICATIONS  (PRN):  dextrose Oral Gel 15 Gram(s) Oral once PRN Blood Glucose LESS THAN 70 milliGRAM(s)/deciliter      ALLERGIES:  Allergies    No Known Allergies    Intolerances        LABS:                        7.0    11.75 )-----------( 252      ( 02 Oct 2023 05:55 )             22.2     10-02    136  |  101  |  111<H>  ----------------------------<  205<H>  4.3   |  21<L>  |  6.10<H>    Ca    6.7<L>      02 Oct 2023 05:55  Phos  8.3     10-02  Mg     1.9     10-02        Urinalysis Basic - ( 02 Oct 2023 05:55 )    Color: x / Appearance: x / SG: x / pH: x  Gluc: 205 mg/dL / Ketone: x  / Bili: x / Urobili: x   Blood: x / Protein: x / Nitrite: x   Leuk Esterase: x / RBC: x / WBC x   Sq Epi: x / Non Sq Epi: x / Bacteria: x        RADIOLOGY & ADDITIONAL TESTS:   < from: US Duplex Venous Lower Ext Complete, Bilateral (10.02.23 @ 01:22) >    ACC: [id number] EXAM:  US DPLX LWR EXT VEINS COMPL BI   ORDERED BY: CALVIN GAMBOA     PROCEDURE DATE:  10/02/2023          INTERPRETATION:  CLINICAL INFORMATION: Bilateral lower extremity   swelling, long flight    COMPARISON: None available.    TECHNIQUE: Duplex sonography of the BILATERAL LOWER extremity veins with   color and spectral Doppler, with and without compression.    FINDINGS:    RIGHT:  Normal compressibility of the RIGHT common femoral, femoral and popliteal   veins.  Doppler examination shows normal spontaneous and phasic flow.  No RIGHT calf vein thrombosis is detected. The soleal and gastrocnemius   veins are not visualized.    LEFT:  Normal compressibility of the LEFT common femoral, femoral and popliteal   veins.  Doppler examination shows normal spontaneous and phasic flow.  No LEFT calf vein thrombosis is detected. The soleal and gastrocnemius   veins are not visualized.    IMPRESSION:  No evidence of deep venous thrombosis in either lower extremity.            --- End of Report ---            RIMMA MELGOZA MD; Attending Radiologist  This document has been electronically signed. Oct  2 2023  2:22AM    < end of copied text >  < from: Xray Chest 1 View- PORTABLE-Urgent (Xray Chest 1 View- PORTABLE-Urgent .) (10.01.23 @ 21:10) >    ACC: [id number] EXAM:  XR CHEST PORTABLE URGENT 1V   ORDERED BY: PAPI CALDERON     PROCEDURE DATE:  10/01/2023          INTERPRETATION:  AP chest on October 1, 2023 at 9:00 PM. Patient is short   of breath.    COMPARISON: None available.    Heart normal for projection.    There is a significant scattered infiltrates off the right hilum and a   much smaller infiltrate at left base.    IMPRESSION: Bilateral infiltrates right much greater than left.    --- End of Report ---      < end of copied text >      CENTRAL LINE: N        DATE INSERTED:             REMOVE: N  DOLAN: Y                       DATE INSERTED:  10/2/23            REMOVE: N  A-LINE: N                       DATE INSERTED:              REMOVE:       GLOBAL ISSUE/BEST PRACTICE  Analgesia: N  Sedation: N  HOB elevation: yes  Stress ulcer prophylaxis: Y  VTE prophylaxis: Y  Glycemic control: Y  Nutrition: Y    CODE STATUS: Full Code

## 2023-10-02 NOTE — DISCHARGE NOTE PROVIDER - ATTENDING DISCHARGE PHYSICAL EXAMINATION:
Patient is well appearing.,  Regular rate and rhythm.  Breathing comfortably on room air with no accessory muscle use.  Lower extremity swelling persistent but with mild improvement.   Pulses equal in all 4 extremities.

## 2023-10-02 NOTE — DIETITIAN INITIAL EVALUATION ADULT - OTHER INFO
43yM pmhx T2DM (on insulin) with diabetic retinopathy/neuropathy, CKD Stage IV (on transplant list), HTN, HLD, HFpEF admitted for acute hypoxic respiratory failure 2/2 PNA vs pulmonary edema also with GABI on CKD4.    Pt sleeping soundly at visit. NPO on Bipap. Hx CKD stage IV. Possible HD. Currently on lasix drip for fluid removal. Hx uncontrolled DM, Hgba1c pending. Pta on Januvia and Lantus 7 units q hs. Currently on 5units Lantus qhs with ss covg. Will remain available for po diet initiation upon improved respiratory status. Recommend Consistent Carbohydrate, Renal, Dash/TLC w/ hs snack. Will follow for diet education when medically appropriate.

## 2023-10-02 NOTE — PROGRESS NOTE ADULT - ASSESSMENT
43yM pmhx T2DM (on insulin) with diabetic retinopathy/neuropathy, CKD Stage IV (on transplant list), HTN, HLD, HFpEF admitted for acute hypoxic respiratory failure 2/2 PNA vs pulmonary edema also with GABI on CKD4.

## 2023-10-02 NOTE — DISCHARGE NOTE PROVIDER - NSDCFUSCHEDAPPT_GEN_ALL_CORE_FT
[facility]  CARDIOLOGY [address]   Scheduled Appointment: 10/03/2023    Corin Fish  [facility]  INTMED [address]  Scheduled Appointment: 10/09/2023    Blanca Resendez  [facility]  WOUNDCARE [address]  Scheduled Appointment: 10/12/2023    Rani Paniagua  [facility]  NEPHRO [address]  Scheduled Appointment: 12/14/2023     Corin Fish  [facility]  INTMED [address] [facility]  Scheduled Appointment: 10/09/2023    Blanca Resendez  [facility]  WOUNDCARE [address]  Scheduled Appointment: 10/12/2023    Rani Paniagua  [facility]  NEPHRO [address]  Scheduled Appointment: 12/14/2023

## 2023-10-02 NOTE — DIETITIAN INITIAL EVALUATION ADULT - PROBLEM SELECTOR PLAN 6
Hgb has been trending down from ~10 in Feb to 8 in September  - Suspect anemia of CKD  - Check iron studies, b12, folate, FOBT, T&S  - No signs/sx of active bleeding at this time  -Transfuse as needed

## 2023-10-02 NOTE — DISCHARGE NOTE PROVIDER - HOSPITAL COURSE
HPI:  43yM pmhx T2DM (on insulin) with diabetic retinopathy/neuropathy, CKD Stage IV (on transplant list), HTN, HLD, HFpEF (on torsemide) initially presented to [facility] ED for SOB x 4 days associated with subjective fevers and productive cough. Patient notes he returned from a vacation in [facility], [facility], and [place] 4 days ago with the longest flight being 18 hours. He missed 2 days of medications while in the trip. Upon returning to the  he began developing symptoms that prompted him to come to the ED today. Denies chest pain, abdominal pain, n/v/d, dysuria, hematuria. Placed on BIPAP in the ER at [facility] with improvement and transferred to [facility] for possible hemodialysis.    [facility] ED Course  Vitals: 99F, 92bpm, 144/81, 21rpm, % on BIPAP 10/5 60% HR: 92 BP: 144/81 RR: 21 SpO2: % on BIPAP  Labs significant for: WBC 13.6, H/H 7.0/23.2, Na 133, BUN/Cr 112/5.99, Glu 164, , , HsTi 83.0, proBNP 63910  CXR: Bilateral patchy opacities right greater than left which may represent multifocal pneumonia or asymmetric pulmonary edema  EKG: NSR at 96bpm, prolonged QT  Given IV lasix 40mg, rocephin/azithro, solu-medrol 125mg, duonebs  Nephro Pancho consulted rec transfer to [facility] (on diversion)  Nephro accepted to [facility] and recs Lasix 80 mg q8-12h vs Lasix gtt  To evaluate for hemodialysis in AM    [facility] ED: received 60mg IV lasix and admitted to the ICU (01 Oct 2023 20:42)      ---  HOSPITAL COURSE:   Pt is a 44 y/o M pmhx of DM2 on insulin w/ diabetic retinopathy/neuropahy, CKD stage IV on transplant list, HTN, HLD, HFpEF on torsemide who presented to  ED for SOB. Was placed on BiPAP in ED w/ improvement, transferred to [facility] for possible Hemodialysis. He had acute hypoxic respiratory failure secondary to pulmonary edema, and was started on lasix gtt. He was placed on BiPAP, which was weaned down. He is positive for enterorhinovirus. He has GABI on CKD likely 2/2 diabetic nephropathy, Cr 6.10. Nephro Dr. Coulter consulted, no need for HD at this time. Quantiferon ____. Hepatitis panel ____. Hgb A1C ____. Pt's T2DM was treated with Lantus 5 u qhs. D dimer elevated, but LE doppler neg for DVT    Updated 10/2        PT consulted, recommends discharge ______    Patient showed improvement throughout hospitalization. Patient was seen and examined on day of discharge. Patient was medically optimized for discharge with close outpatient follow up.        ---  CONSULTANTS:     ---  TIME SPENT:  I, the attending physician, was physically present for the key portions of the evaluation and management (E/M) service provided. The total amount of time spent reviewing the hospital notes, laboratory values, imaging findings, assessing/counseling the patient, discussing with consultant physicians, social work, nursing staff was -- minutes   HPI:  43yM pmhx T2DM (on insulin) with diabetic retinopathy/neuropathy, CKD Stage IV (on transplant list), HTN, HLD, HFpEF (on torsemide) initially presented to [facility] ED for SOB x 4 days associated with subjective fevers and productive cough. Patient notes he returned from a vacation in [facility], and [place] 4 days ago with the longest flight being 18 hours. He missed 2 days of medications while in the trip. Upon returning to the  he began developing symptoms that prompted him to come to the ED today. Denies chest pain, abdominal pain, n/v/d, dysuria, hematuria. Placed on BIPAP in the ER at [facility] with improvement and transferred to [facility] for possible hemodialysis.    [facility] ED Course  Vitals: 99F, 92bpm, 144/81, 21rpm, % on BIPAP 10/5 60% HR: 92 BP: 144/81 RR: 21 SpO2: % on BIPAP  Labs significant for: WBC 13.6, H/H 7.0/23.2, Na 133, BUN/Cr 112/5.99, Glu 164, , , HsTi 83.0, proBNP 98933  CXR: Bilateral patchy opacities right greater than left which may represent multifocal pneumonia or asymmetric pulmonary edema  EKG: NSR at 96bpm, prolonged QT  Given IV lasix 40mg, rocephin/azithro, solu-medrol 125mg, duonebs  Nephro Pancho consulted rec transfer to [facility] (on diversion)  Nephro accepted to [facility] and recs Lasix 80 mg q8-12h vs Lasix gtt  To evaluate for hemodialysis in AM    [facility] ED: received 60mg IV lasix and admitted to the ICU (01 Oct 2023 20:42)      ---  HOSPITAL COURSE:   Pt is a 42 y/o M pmhx of DM2 on insulin w/ diabetic retinopathy/neuropahy, CKD stage IV on transplant list, HTN, HLD, HFpEF on torsemide who presented to  ED for SOB. Was placed on BiPAP in ED w/ improvement, transferred to [facility] for possible Hemodialysis. He had acute hypoxic respiratory failure secondary to pulmonary edema, and was started on lasix gtt. He was placed on BiPAP, which was eventually weaned down to RA. He is positive for enterorhinovirus. He has GABI on CKD likely 2/2 diabetic nephropathy, Cr 6.10. Nephro Dr. Coulter consulted. Quantiferon indeterminate. Sputum Cx no acid fast bacilli seen. Hepatitis panel neg for acute disease. Hgb A1C 6.0. Pt's T2DM was treated with Lantus 7 u qhs and admelog6 u premeal TID. D dimer elevated, but LE doppler neg for DVT. Pt'd hgb dropped to 6.8, was given 1 u pRBC. BCx NGTD, UCx NGTD.     Updated 10/4        PT consulted, recommends discharge ______    Patient showed improvement throughout hospitalization. Patient was seen and examined on day of discharge. Patient was medically optimized for discharge with close outpatient follow up.        ---  CONSULTANTS:   Nephro: Dr. Coulter      ---  TIME SPENT:  I, the attending physician, was physically present for the key portions of the evaluation and management (E/M) service provided. The total amount of time spent reviewing the hospital notes, laboratory values, imaging findings, assessing/counseling the patient, discussing with consultant physicians, social work, nursing staff was -- minutes   HPI:  43yM pmhx T2DM (on insulin) with diabetic retinopathy/neuropathy, CKD Stage IV (on transplant list), HTN, HLD, HFpEF (on torsemide) initially presented to [facility] ED for SOB x 4 days associated with subjective fevers and productive cough. Patient notes he returned from a vacation in [facility], and [place] 4 days ago with the longest flight being 18 hours. He missed 2 days of medications while in the trip. Upon returning to the  he began developing symptoms that prompted him to come to the ED today. Denies chest pain, abdominal pain, n/v/d, dysuria, hematuria. Placed on BIPAP in the ER at [facility] with improvement and transferred to [facility] for possible hemodialysis.    [facility] ED Course  Vitals: 99F, 92bpm, 144/81, 21rpm, % on BIPAP 10/5 60% HR: 92 BP: 144/81 RR: 21 SpO2: % on BIPAP  Labs significant for: WBC 13.6, H/H 7.0/23.2, Na 133, BUN/Cr 112/5.99, Glu 164, , , HsTi 83.0, proBNP 47656  CXR: Bilateral patchy opacities right greater than left which may represent multifocal pneumonia or asymmetric pulmonary edema  EKG: NSR at 96bpm, prolonged QT  Given IV lasix 40mg, rocephin/azithro, solu-medrol 125mg, duonebs  Nephro Pancho consulted rec transfer to [facility] (on diversion)  Nephro accepted to [facility] and recs Lasix 80 mg q8-12h vs Lasix gtt  To evaluate for hemodialysis in AM    [facility] ED: received 60mg IV lasix and admitted to the ICU (01 Oct 2023 20:42)      ---  HOSPITAL COURSE:   Pt is a 42 y/o M pmhx of DM2 on insulin w/ diabetic retinopathy/neuropahy, CKD stage IV on transplant list, HTN, HLD, HFpEF on torsemide who presented to  ED for SOB. Was placed on BiPAP in ED w/ improvement, transferred to [facility] for possible Hemodialysis. He had acute hypoxic respiratory failure secondary to pulmonary edema, and was started on lasix gtt. He was placed on BiPAP, which was eventually weaned down to RA. He is positive for enterorhinovirus. He has GABI on CKD likely 2/2 diabetic nephropathy, Cr 6.10. Nephro Dr. Coulter consulted. Quantiferon indeterminate. Sputum Cx no acid fast bacilli seen. Hepatitis panel neg for acute disease. Hgb A1C 6.0. Pt's T2DM was treated with Lantus 7 u qhs and admelog6 u premeal TID. D dimer elevated, but LE doppler neg for DVT. Pt'd hgb dropped to 6.8, was given 1 u pRBC. BCx NGTD, UCx NGTD. His Cr was stable at 6.3. He did not require dialysis. Patient showed improvement throughout hospitalization. Patient was seen and examined on day of discharge. Patient was medically optimized for discharge with close Nephrology and PCP outpatient follow up.      ICU Vital Signs Last 24 Hrs  T(C): 36.2 (05 Oct 2023 08:22), Max: 36.8 (04 Oct 2023 20:31)  T(F): 97.2 (05 Oct 2023 08:22), Max: 98.2 (04 Oct 2023 20:31)  HR: 77 (05 Oct 2023 10:00) (75 - 87)  BP: 153/81 (05 Oct 2023 10:00) (141/78 - 173/92)  BP(mean): 108 (05 Oct 2023 10:00) (95 - 123)  RR: 15 (05 Oct 2023 10:00) (10 - 30)  SpO2: 97% (05 Oct 2023 10:00) (94% - 97%)    O2 Parameters below as of 04 Oct 2023 20:00  Patient On (Oxygen Delivery Method): room air    PHYSICAL EXAM:  General: NAD, comfortable  HEENT: NCAT  Respiratory: CTA b/l, no wheezing, rhonchi, rales  Cardiovascular: RRR, normal S1S2, no M/R/G  Abdomen: soft, NT/ND, bowel sounds present  Extremities: Bilateral 1+ pitting LE edema. no calf tenderness  Neurology: awake and alert      ---  CONSULTANTS:   Nephro: Dr. Coulter      ---  TIME SPENT:  I, the attending physician, was physically present for the key portions of the evaluation and management (E/M) service provided. The total amount of time spent reviewing the hospital notes, laboratory values, imaging findings, assessing/counseling the patient, discussing with consultant physicians, social work, nursing staff was 48 minutes   HPI:  43yM pmhx T2DM (on insulin) with diabetic retinopathy/neuropathy, CKD Stage IV (on transplant list), HTN, HLD, HFpEF (on torsemide) initially presented to [place] ED for SOB x 4 days associated with subjective fevers and productive cough. Patient notes he returned from a vacation in [facility] [facility], and [place] 4 days ago with the longest flight being 18 hours. He missed 2 days of medications while in the trip. Upon returning to the  he began developing symptoms that prompted him to come to the ED today. Denies chest pain, abdominal pain, n/v/d, dysuria, hematuria. Placed on BIPAP in the ER at [facility] with improvement and transferred to [facility] for possible hemodialysis.    [facility] ED Course  Vitals: 99F, 92bpm, 144/81, 21rpm, % on BIPAP 10/5 60% HR: 92 BP: 144/81 RR: 21 SpO2: % on BIPAP  Labs significant for: WBC 13.6, H/H 7.0/23.2, Na 133, BUN/Cr 112/5.99, Glu 164, , , HsTi 83.0, proBNP 50159  CXR: Bilateral patchy opacities right greater than left which may represent multifocal pneumonia or asymmetric pulmonary edema  EKG: NSR at 96bpm, prolonged QT  Given IV lasix 40mg, rocephin/azithro, solu-medrol 125mg, duonebs  Nephro Pancho consulted rec transfer to [facility] (on diversion)  Nephro accepted to [facility] and recs Lasix 80 mg q8-12h vs Lasix gtt  To evaluate for hemodialysis in AM    [facility] ED: received 60mg IV lasix and admitted to the ICU (01 Oct 2023 20:42)      ---  HOSPITAL COURSE:   Pt is a 44 y/o M pmhx of DM2 on insulin w/ diabetic retinopathy/neuropahy, CKD stage IV on transplant list, HTN, HLD, HFpEF on torsemide who presented to  ED for SOB. Was placed on BiPAP in ED w/ improvement, transferred to [facility] for possible Hemodialysis. He had acute hypoxic respiratory failure secondary to pulmonary edema, and was started on lasix gtt. He was placed on BiPAP, which was eventually weaned down to RA. He is positive for enterorhinovirus. He has GABI on CKD likely 2/2 diabetic nephropathy, Cr 6.10. Nephro Dr. Coulter consulted. Quantiferon indeterminate. Sputum Cx no acid fast bacilli seen. Hepatitis panel neg for acute disease. Hgb A1C 6.0. Pt's T2DM was treated with Lantus 7 u qhs and admelog6 u premeal TID. D dimer elevated, but LE doppler neg for DVT. Pt'd hgb dropped to 6.8, was given 1 u pRBC. BCx NGTD, UCx NGTD. His Cr was stable at 6.3. He did not require dialysis. Patient showed improvement throughout hospitalization. Patient was seen and examined on day of discharge. Patient was medically optimized for discharge with close Nephrology and PCP outpatient follow up.      ICU Vital Signs Last 24 Hrs  T(C): 36.2 (05 Oct 2023 08:22), Max: 36.8 (04 Oct 2023 20:31)  T(F): 97.2 (05 Oct 2023 08:22), Max: 98.2 (04 Oct 2023 20:31)  HR: 77 (05 Oct 2023 10:00) (75 - 87)  BP: 153/81 (05 Oct 2023 10:00) (141/78 - 173/92)  BP(mean): 108 (05 Oct 2023 10:00) (95 - 123)  RR: 15 (05 Oct 2023 10:00) (10 - 30)  SpO2: 97% (05 Oct 2023 10:00) (94% - 97%)    O2 Parameters below as of 04 Oct 2023 20:00  Patient On (Oxygen Delivery Method): room air    PHYSICAL EXAM:  General: NAD, comfortable  HEENT: NCAT  Respiratory: CTA b/l, no wheezing, rhonchi, rales  Cardiovascular: RRR, normal S1S2, no M/R/G  Abdomen: soft, NT/ND, bowel sounds present  Extremities: Bilateral 1+ pitting LE edema. no calf tenderness  Neurology: awake and alert      ---  CONSULTANTS:   Nephro: Dr. Coulter      ---  TIME SPENT:  Lety WEINSTEIN, the attending physician, was physically present for the key portions of the evaluation and management (E/M) service provided. The total amount of time spent reviewing the hospital notes, laboratory values, imaging findings, assessing/counseling the patient, discussing with consultant physicians, social work, nursing staff was 35 minutes

## 2023-10-02 NOTE — DISCHARGE NOTE PROVIDER - CARE PROVIDERS DIRECT ADDRESSES
,DirectAddress_Unknown,DirectAddress_Unknown,[email or web address]

## 2023-10-02 NOTE — PROGRESS NOTE ADULT - SUBJECTIVE AND OBJECTIVE BOX
Patient is a 43y old  Male who presents with a chief complaint of Acute hypoxic respiratory failure (02 Oct 2023 14:50)      INTERVAL HPI/OVERNIGHT EVENTS: Patient seen and examined at bedside. No overnight events.  ROS limited due to BIPAP    MEDICATIONS  (STANDING):  calcium acetate 1334 milliGRAM(s) Oral three times a day with meals  chlorhexidine 2% Cloths 1 Application(s) Topical <User Schedule>  dextrose 5%. 1000 milliLiter(s) (50 mL/Hr) IV Continuous <Continuous>  dextrose 5%. 1000 milliLiter(s) (100 mL/Hr) IV Continuous <Continuous>  dextrose 50% Injectable 12.5 Gram(s) IV Push once  dextrose 50% Injectable 25 Gram(s) IV Push once  dextrose 50% Injectable 25 Gram(s) IV Push once  epoetin zulay (PROCRIT) Injectable 40698 Unit(s) SubCutaneous <User Schedule>  furosemide Infusion 10 mG/Hr (5 mL/Hr) IV Continuous <Continuous>  glucagon  Injectable 1 milliGRAM(s) IntraMuscular once  heparin   Injectable 5000 Unit(s) SubCutaneous every 8 hours  influenza   Vaccine 0.5 milliLiter(s) IntraMuscular once  insulin glargine Injectable (LANTUS) 5 Unit(s) SubCutaneous at bedtime  insulin lispro (ADMELOG) corrective regimen sliding scale   SubCutaneous every 6 hours  labetalol 200 milliGRAM(s) Oral every 8 hours  sodium bicarbonate 650 milliGRAM(s) Oral two times a day    MEDICATIONS  (PRN):  dextrose Oral Gel 15 Gram(s) Oral once PRN Blood Glucose LESS THAN 70 milliGRAM(s)/deciliter      Allergies    No Known Allergies    Intolerances    Vital Signs Last 24 Hrs  T(C): 36.7 (02 Oct 2023 12:09), Max: 37.2 (01 Oct 2023 18:23)  T(F): 98 (02 Oct 2023 12:09), Max: 99 (01 Oct 2023 18:23)  HR: 82 (02 Oct 2023 14:00) (82 - 101)  BP: 132/75 (02 Oct 2023 14:00) (129/78 - 173/95)  BP(mean): 97 (02 Oct 2023 14:00) (97 - 129)  RR: 18 (02 Oct 2023 14:00) (13 - 32)  SpO2: 98% (02 Oct 2023 14:00) (93% - 100%)    Parameters below as of 02 Oct 2023 08:00  Patient On (Oxygen Delivery Method): BiPAP/CPAP    O2 Concentration (%): 40  I&O's Summary    01 Oct 2023 07:01  -  02 Oct 2023 07:00  --------------------------------------------------------  IN: 115 mL / OUT: 1500 mL / NET: -1385 mL    02 Oct 2023 07:01  -  02 Oct 2023 15:46  --------------------------------------------------------  IN: 135 mL / OUT: 674 mL / NET: -539 mL      BMI (kg/m2): 35.9 (10-01-23 @ 22:12), 44.9 (10-01-23 @ 18:23)    PHYSICAL EXAM:  GENERAL: NAD  HEENT:  AT/NC, anicteric, dry mucous membranes, EOMI, PERRL, conjunctiva and sclera clear  CHEST/LUNG: diminished breath sounds at bases, poor inspiratory effort, rhonchi present  HEART:  RRR, S1, S2, no murmurs; 2+ pitting edema  ABDOMEN:  BS+, soft, nontender, nondistended  MSK/EXTREMITIES: palpable peripheral pulses, no clubbing or cyanosis  NERVOUS SYSTEM: A&Ox3 grossly moves all extremities   PSYCH: Appropriate affect, Alert & Awake; fair judgement      LABS: Personally reviewed  CBC                        7.0    11.75 )-----------( 252      ( 02 Oct 2023 05:55 )             22.2     CMP  10-02    137  |  102  |  112  ----------------------------<  232  4.2   |  22  |  6.20    Ca    6.7      02 Oct 2023 13:43  Phos  8.3     10-02  Mg     1.9     10-02    TPro  6.3  /  Alb  2.3  /  TBili  0.3  /  DBili  x   /  AST  113  /  ALT  229  /  AlkPhos  61  10-02          PT/INR - ( 01 Oct 2023 18:23 )   PT: 16.0 sec;   INR: 1.48 ratio         PTT - ( 01 Oct 2023 18:23 )  PTT:31.4 sec  Lactate, Blood: 0.9 mmol/L (10-01 @ 21:24)  Lactate, Blood: 0.9 mmol/L (10-01 @ 19:15)    Procalcitonin, Serum: 1.22 ng/mL (10-02-23 @ 05:55)    CARDIAC MARKERS ( 02 Oct 2023 05:55 )  x     / 76.7 ng/L / 1768 U/L / x     / 9.6 ng/mL  CARDIAC MARKERS ( 01 Oct 2023 21:24 )  x     / 91.9 ng/L / 2666 U/L / x     / 16.2 ng/mL  CARDIAC MARKERS ( 01 Oct 2023 18:23 )  x     / 83.0 ng/L / x     / x     / x                ABG - ( 01 Oct 2023 21:01 )  pH, Arterial: 7.44  pH, Blood: x     /  pCO2: 33    /  pO2: 91    / HCO3: 22    / Base Excess: -1.8  /  SaO2: 97.4                    POCT Blood Glucose.: 212 mg/dL (02 Oct 2023 11:42)  POCT Blood Glucose.: 217 mg/dL (02 Oct 2023 05:27)  POCT Blood Glucose.: 200 mg/dL (02 Oct 2023 00:39)      Urinalysis Basic - ( 02 Oct 2023 13:43 )    Color: x / Appearance: x / SG: x / pH: x  Gluc: 232 mg/dL / Ketone: x  / Bili: x / Urobili: x   Blood: x / Protein: x / Nitrite: x   Leuk Esterase: x / RBC: x / WBC x   Sq Epi: x / Non Sq Epi: x / Bacteria: x                RADIOLOGY & ADDITIONAL TESTS: Personally reviewed.     Consultant(s) Notes Reviewed:  [x] YES  [ ] NO - discussed with renal Dr Coulter continue to monitor Cr, no need for HD at this time  Discussed with STEFANY/TINA, RN

## 2023-10-02 NOTE — CHART NOTE - NSCHARTNOTEFT_GEN_A_CORE
: MOI Zarate MD    INDICATION: Hypoxia    PROCEDURE:  [x] LIMITED ECHO  [x] LIMITED CHEST  [ ] LIMITED RETROPERITONEAL  [ ] LIMITED ABDOMINAL  [ ] LIMITED DVT  [ ] NEEDLE GUIDANCE VASCULAR  [ ] NEEDLE GUIDANCE THORACENTESIS  [ ] NEEDLE GUIDANCE PARACENTESIS  [ ] NEEDLE GUIDANCE PERICARDIOCENTESIS  [ ] OTHER    FINDINGS: LV systolic fxn grossly normal, RV not enlarged, no septal bowing, no pericardial effusion, b/l B-lines diffusely, B/l small to moderate pleural effusions R > L.       INTERPRETATION: Chest findings likely due to volume overload in the setting of renal failure and known diastolic dysfunction, no signs of right heart strain low suspicion for PE

## 2023-10-02 NOTE — DIETITIAN INITIAL EVALUATION ADULT - PROBLEM SELECTOR PLAN 5
Suspect congestive hepatopathy vs statin induced myopathy  - Hold statin in setting of transaminitis and elevated CK  - Continue diuretics and trend CMP  - avoid hepatotoxic medications

## 2023-10-02 NOTE — CONSULT NOTE ADULT - ASSESSMENT
CKD 5, Diabetic nephropathy  Dyspnea: Viral illness, ? fluid overload  Anemia  Diabetes  Hyperphosphatemia    Progression of CKD. On lasix drip. Monitor UO. No indication for urgent hemodialysis. Add procrit. Check iron studies.   Add calcium acetate when started on PO feeds. Monitor blood sugar levels. Insulin coverage as needed.   ICU management. Will follow electrolytes and renal function trend. Avoid nephrotoxic meds as possible. Avoid ACEI, ARB, NSAIDs and IV contrast.     Further recommendations pending clinical course. Thank you for the courtesy of this referral.

## 2023-10-02 NOTE — DIETITIAN INITIAL EVALUATION ADULT - PROBLEM SELECTOR PLAN 2
Secondary to renal failure/HFpEF  - Noted ECHO done for pre-transplant evaluation - normal EF  - On torsemide 80mg qd, recently upped to alternating doses of 80mg qd and BID  - Continue IV diuretics as above - may ultimately need HD  - Daily weight's, Strict I's and O's  - Trop slightly elevated at 83 likely demand in setting of resp distress/renal failure  - Trend trops to peak and follow up ECHO  - F/u Cardiology consult

## 2023-10-02 NOTE — DIETITIAN INITIAL EVALUATION ADULT - PROBLEM SELECTOR PLAN 4
BLCr ~4, has been progressively worsening with Cr 5.7 in 9/2023  - Continue IV diuretics per renal and ICU  - Assess for possible hemodialysis - no urgent dialytic indication at this time  - patient still produces urine  - Nephrology consulted Dr. Deleon ([facility])

## 2023-10-02 NOTE — DISCHARGE NOTE PROVIDER - CARE PROVIDER_API CALL
Corin Fish  Internal Medicine  [address], [address]  [place] [address]  Phone: [phone]  Fax: [phone]  Established Patient  Follow Up Time: 1 week    Isabella Lares  NP in [address] [address]  Phone: [phone]  Fax: [phone]  Established Patient  Follow Up Time: 1 week    Sheldon Ornelas  Cardiovascular Disease  [address], [address]  [place] [address]  Phone: [phone]  Fax: [phone]  Established Patient  Follow Up Time: 1 week

## 2023-10-02 NOTE — DIETITIAN INITIAL EVALUATION ADULT - PROBLEM SELECTOR PLAN 3
Met sepsis criteria: HR, WBC, source: PNA, respiratory rate   - likely 2/2 AHRF due to PNA vs acute pulmonary edema vs PE  - CXR with possible R sided PNA vs asymmetric pulm edema  - Has subjective fever at home with cough - would cover for PNA  - He is clinically hypervolemic with anasarca - may need dialysis if not improving with diuretics  - will hold off on sepsis fluids given concern for volume overload in the setting of pulmonary edema 2/2 CHF vs CKD  - ICU care for now; plan as above

## 2023-10-02 NOTE — CONSULT NOTE ADULT - SUBJECTIVE AND OBJECTIVE BOX
Patient is a 43y old  Male who presents with a chief complaint of Acute hypoxic respiratory failure (01 Oct 2023 21:29)    HPI:  43yM pmhx T2DM (on insulin) with diabetic retinopathy/neuropathy, CKD Stage IV (on transplant list), HTN, HLD, HFpEF (on torsemide) initially presented to [place] ED for SOB x 4 days associated with subjective fevers and productive cough. Patient notes he returned from a vacation in [facility], [facility], and [place] 4 days ago with the longest flight being 18 hours. He missed 2 days of medications while in the trip. Upon returning to the  he began developing symptoms that prompted him to come to the ED today. Denies chest pain, abdominal pain, n/v/d, dysuria, hematuria. Placed on BIPAP in the ER at [place] with improvement and transferred to [facility] for possible hemodialysis.    [place] ED Course  Vitals: 99F, 92bpm, 144/81, 21rpm, % on BIPAP 10/5 60% HR: 92 BP: 144/81 RR: 21 SpO2: % on BIPAP  Labs significant for: WBC 13.6, H/H 7.0/23.2, Na 133, BUN/Cr 112/5.99, Glu 164, , , HsTi 83.0, proBNP 50103  CXR: Bilateral patchy opacities right greater than left which may represent multifocal pneumonia or asymmetric pulmonary edema  EKG: NSR at 96bpm, prolonged QT  Given IV lasix 40mg, rocephin/azithro, solu-medrol 125mg, duoneenriqueta  Nephro Pancho consulted rec transfer to [facility] (on diversion)  Nephro accepted to [facility] and recs Lasix 80 mg q8-12h vs Lasix gtt  To evaluate for hemodialysis in AM    [facility] ED: received 60mg IV lasix and admitted to the ICU (01 Oct 2023 20:42)      PAST MEDICAL HISTORY:  Insulin dependent type 2 diabetes mellitus    Stage 4 chronic kidney disease    HTN (hypertension)    HLD (hyperlipidemia)    History of diabetic retinopathy        PAST SURGICAL HISTORY:  History of incision and drainage        FAMILY HISTORY:  Family history of insulin dependent diabetes mellitus (Mother)        SOCIAL HISTORY:    Allergies    No Known Allergies    Intolerances      Home Medications:  amLODIPine 10 mg oral tablet: 1 tab(s) orally once a day (01 Oct 2023 22:09)  Coreg 3.125 mg oral tablet: 1 tab(s) orally 2 times a day (01 Oct 2023 22:10)  Crestor 10 mg oral tablet: 1 tab(s) orally once a day (at bedtime) (01 Oct 2023 22:10)  Januvia 25 mg oral tablet: 1 tab(s) orally once a day (01 Oct 2023 22:08)  Lantus 100 units/mL subcutaneous solution: 7 unit(s) subcutaneous once a day (at bedtime) (01 Oct 2023 22:09)  sodium bicarbonate 650 mg oral tablet: 1 tab(s) orally 3 times a day (01 Oct 2023 22:10)  torsemide 40 mg oral tablet: 2 tab(s) orally once a day alternate between 80mg and 160mg qd (01 Oct 2023 22:08)    MEDICATIONS  (STANDING):  chlorhexidine 2% Cloths 1 Application(s) Topical <User Schedule>  dextrose 5%. 1000 milliLiter(s) (100 mL/Hr) IV Continuous <Continuous>  dextrose 5%. 1000 milliLiter(s) (50 mL/Hr) IV Continuous <Continuous>  dextrose 50% Injectable 25 Gram(s) IV Push once  dextrose 50% Injectable 12.5 Gram(s) IV Push once  dextrose 50% Injectable 25 Gram(s) IV Push once  furosemide Infusion 10 mG/Hr (5 mL/Hr) IV Continuous <Continuous>  glucagon  Injectable 1 milliGRAM(s) IntraMuscular once  heparin   Injectable 5000 Unit(s) SubCutaneous every 8 hours  influenza   Vaccine 0.5 milliLiter(s) IntraMuscular once  insulin glargine Injectable (LANTUS) 5 Unit(s) SubCutaneous at bedtime  insulin lispro (ADMELOG) corrective regimen sliding scale   SubCutaneous every 6 hours  pantoprazole  Injectable 40 milliGRAM(s) IV Push daily  piperacillin/tazobactam IVPB.- 3.375 Gram(s) IV Intermittent once  piperacillin/tazobactam IVPB.- 3.375 Gram(s) IV Intermittent once  piperacillin/tazobactam IVPB.. 3.375 Gram(s) IV Intermittent every 12 hours    MEDICATIONS  (PRN):  dextrose Oral Gel 15 Gram(s) Oral once PRN Blood Glucose LESS THAN 70 milliGRAM(s)/deciliter      REVIEW OF SYSTEMS:  General:   Respiratory: No cough, SOB  Cardiovascular: No CP or Palpitations	  Gastrointestinal: No nausea, Vomiting. No diarrhea  Genitourinary: No urinary complaints	  Musculoskeletal: No leg swelling, No new rash or lesions	  Neurological: 	  all other systems negative    T(F): 98 (10-02-23 @ 08:26), Max: 98 (10-02-23 @ 04:14)  HR: 87 (10-02-23 @ 09:00) (85 - 98)  BP: 147/87 (10-02-23 @ 09:00) (147/87 - 167/99)  RR: 14 (10-02-23 @ 09:00) (14 - 22)  SpO2: 100% (10-02-23 @ 09:00) (96% - 100%)  Wt(kg): --    PHYSICAL EXAM:  General: NAD  Respiratory: b/l air entry  Cardiovascular: S1 S2  Gastrointestinal: soft  Extremities: edema        10-02    136  |  101  |  111<H>  ----------------------------<  205<H>  4.3   |  21<L>  |  6.10<H>    Ca    6.7<L>      02 Oct 2023 05:55  Phos  8.0     10-02  Mg     1.9     10-02                            7.0    11.75 )-----------( 252      ( 02 Oct 2023 05:55 )             22.2       Potassium: 4.3 mmol/L (10-02 @ 05:55)  Blood Urea Nitrogen: 111 mg/dL (10-02 @ 05:55)  Calcium: 6.7 mg/dL (10-02 @ 05:55)  Hemoglobin: 7.0 g/dL (10-02 @ 05:55)      Creatinine, Serum: 6.10 (10-02 @ 05:55)      Urinalysis Basic - ( 02 Oct 2023 05:55 )    Color: x / Appearance: x / SG: x / pH: x  Gluc: 205 mg/dL / Ketone: x  / Bili: x / Urobili: x   Blood: x / Protein: x / Nitrite: x   Leuk Esterase: x / RBC: x / WBC x   Sq Epi: x / Non Sq Epi: x / Bacteria: x        CARDIAC MARKERS ( 02 Oct 2023 05:55 )  x     / x     / 1768 U/L / x     / 9.6 ng/mL  CARDIAC MARKERS ( 01 Oct 2023 21:24 )  x     / x     / 2666 U/L / x     / 16.2 ng/mL      Creatine Kinase, Serum: 1768 U/L (10-02-23 @ 05:55)  Creatine Kinase, Serum: 2666 U/L (10-01-23 @ 21:24)        ABG - ( 01 Oct 2023 21:01 )  pH, Arterial: 7.44  pH, Blood: x     /  pCO2: 33    /  pO2: 91    / HCO3: 22    / Base Excess: -1.8  /  SaO2: 97.4              I&O's Detail    01 Oct 2023 07:01  -  02 Oct 2023 07:00  --------------------------------------------------------  IN:    Furosemide: 15 mL    IV PiggyBack: 100 mL  Total IN: 115 mL    OUT:    Indwelling Catheter - Urethral (mL): 1500 mL  Total OUT: 1500 mL    Total NET: -1385 mL      02 Oct 2023 07:01  -  02 Oct 2023 09:40  --------------------------------------------------------  IN:    Furosemide: 10 mL  Total IN: 10 mL    OUT:    Indwelling Catheter - Urethral (mL): 674 mL  Total OUT: 674 mL    Total NET: -664 mL               Patient is a 43y old  Male who presents with a chief complaint of Acute hypoxic respiratory failure (01 Oct 2023 21:29)    HPI:  43yM pmhx T2DM (on insulin) with diabetic retinopathy/neuropathy, CKD Stage IV (on transplant list), HTN, HLD, HFpEF (on torsemide) initially presented to [place] ED for SOB x 4 days associated with subjective fevers and productive cough. Patient notes he returned from a vacation in [facility], and [place] 4 days ago with the longest flight being 18 hours. He missed 2 days of medications while in the trip. Upon returning to the  he began developing symptoms that prompted him to come to the ED today. Denies chest pain, abdominal pain, n/v/d, dysuria, hematuria. Placed on BIPAP in the ER at [place] with improvement and [facility].    [place] ED Course  Vitals: 99F, 92bpm, 144/81, 21rpm, % on BIPAP 10/5 60% HR: 92 BP: 144/81 RR: 21 SpO2: % on BIPAP  Labs significant for: WBC 13.6, H/H 7.0/23.2, Na 133, BUN/Cr 112/5.99, Glu 164, , , HsTi 83.0, proBNP 02577  CXR: Bilateral patchy opacities right greater than left which may represent multifocal pneumonia or asymmetric pulmonary edema  EKG: NSR at 96bpm, prolonged QT  Given IV lasix 40mg, rocephin/azithro, solu-medrol 125mg, duonebs  Nephro Pancoh consulted rec transfer to [facility] (on diversion)  Nephro accepted to [facility] and recs Lasix 80 mg q8-12h vs Lasix gtt  To evaluate for hemodialysis in AM    [facility] ED: received 60mg IV lasix and admitted to the ICU (01 Oct 2023 20:42)    Renal consult called for chronic kidney disease stage 5. History obtained from chart. Pt on BIPAP.       PAST MEDICAL HISTORY:  Insulin dependent type 2 diabetes mellitus    Stage 4 chronic kidney disease    HTN (hypertension)    HLD (hyperlipidemia)    History of diabetic retinopathy        PAST SURGICAL HISTORY:  History of incision and drainage        FAMILY HISTORY:  Family history of insulin dependent diabetes mellitus (Mother)        SOCIAL HISTORY: No smoking or alcohol use     Allergies    No Known Allergies    Intolerances      Home Medications:  amLODIPine 10 mg oral tablet: 1 tab(s) orally once a day (01 Oct 2023 22:09)  Coreg 3.125 mg oral tablet: 1 tab(s) orally 2 times a day (01 Oct 2023 22:10)  Crestor 10 mg oral tablet: 1 tab(s) orally once a day (at bedtime) (01 Oct 2023 22:10)  Januvia 25 mg oral tablet: 1 tab(s) orally once a day (01 Oct 2023 22:08)  Lantus 100 units/mL subcutaneous solution: 7 unit(s) subcutaneous once a day (at bedtime) (01 Oct 2023 22:09)  sodium bicarbonate 650 mg oral tablet: 1 tab(s) orally 3 times a day (01 Oct 2023 22:10)  torsemide 40 mg oral tablet: 2 tab(s) orally once a day alternate between 80mg and 160mg qd (01 Oct 2023 22:08)    MEDICATIONS  (STANDING):  chlorhexidine 2% Cloths 1 Application(s) Topical <User Schedule>  dextrose 5%. 1000 milliLiter(s) (100 mL/Hr) IV Continuous <Continuous>  dextrose 5%. 1000 milliLiter(s) (50 mL/Hr) IV Continuous <Continuous>  dextrose 50% Injectable 25 Gram(s) IV Push once  dextrose 50% Injectable 12.5 Gram(s) IV Push once  dextrose 50% Injectable 25 Gram(s) IV Push once  furosemide Infusion 10 mG/Hr (5 mL/Hr) IV Continuous <Continuous>  glucagon  Injectable 1 milliGRAM(s) IntraMuscular once  heparin   Injectable 5000 Unit(s) SubCutaneous every 8 hours  influenza   Vaccine 0.5 milliLiter(s) IntraMuscular once  insulin glargine Injectable (LANTUS) 5 Unit(s) SubCutaneous at bedtime  insulin lispro (ADMELOG) corrective regimen sliding scale   SubCutaneous every 6 hours  pantoprazole  Injectable 40 milliGRAM(s) IV Push daily  piperacillin/tazobactam IVPB.- 3.375 Gram(s) IV Intermittent once  piperacillin/tazobactam IVPB.- 3.375 Gram(s) IV Intermittent once  piperacillin/tazobactam IVPB.. 3.375 Gram(s) IV Intermittent every 12 hours    MEDICATIONS  (PRN):  dextrose Oral Gel 15 Gram(s) Oral once PRN Blood Glucose LESS THAN 70 milliGRAM(s)/deciliter      REVIEW OF SYSTEMS:  General: on BIPAP  Unable to obtain    T(F): 98 (10-02-23 @ 08:26), Max: 98 (10-02-23 @ 04:14)  HR: 87 (10-02-23 @ 09:00) (85 - 98)  BP: 147/87 (10-02-23 @ 09:00) (147/87 - 167/99)  RR: 14 (10-02-23 @ 09:00) (14 - 22)  SpO2: 100% (10-02-23 @ 09:00) (96% - 100%)  Wt(kg): --    PHYSICAL EXAM:  General: on BIPAP  Respiratory: b/l air entry  Cardiovascular: S1 S2  Gastrointestinal: soft  Extremities: + edema        10-02    136  |  101  |  111<H>  ----------------------------<  205<H>  4.3   |  21<L>  |  6.10<H>    Ca    6.7<L>      02 Oct 2023 05:55  Phos  8.0     10-02  Mg     1.9     10-02                            7.0    11.75 )-----------( 252      ( 02 Oct 2023 05:55 )             22.2       Potassium: 4.3 mmol/L (10-02 @ 05:55)  Blood Urea Nitrogen: 111 mg/dL (10-02 @ 05:55)  Calcium: 6.7 mg/dL (10-02 @ 05:55)  Hemoglobin: 7.0 g/dL (10-02 @ 05:55)      Creatinine, Serum: 6.10 (10-02 @ 05:55)      Urinalysis Basic - ( 02 Oct 2023 05:55 )    Color: x / Appearance: x / SG: x / pH: x  Gluc: 205 mg/dL / Ketone: x  / Bili: x / Urobili: x   Blood: x / Protein: x / Nitrite: x   Leuk Esterase: x / RBC: x / WBC x   Sq Epi: x / Non Sq Epi: x / Bacteria: x        CARDIAC MARKERS ( 02 Oct 2023 05:55 )  x     / x     / 1768 U/L / x     / 9.6 ng/mL  CARDIAC MARKERS ( 01 Oct 2023 21:24 )  x     / x     / 2666 U/L / x     / 16.2 ng/mL      Creatine Kinase, Serum: 1768 U/L (10-02-23 @ 05:55)  Creatine Kinase, Serum: 2666 U/L (10-01-23 @ 21:24)        ABG - ( 01 Oct 2023 21:01 )  pH, Arterial: 7.44  pH, Blood: x     /  pCO2: 33    /  pO2: 91    / HCO3: 22    / Base Excess: -1.8  /  SaO2: 97.4              I&O's Detail    01 Oct 2023 07:01  -  02 Oct 2023 07:00  --------------------------------------------------------  IN:    Furosemide: 15 mL    IV PiggyBack: 100 mL  Total IN: 115 mL    OUT:    Indwelling Catheter - Urethral (mL): 1500 mL  Total OUT: 1500 mL    Total NET: -1385 mL      02 Oct 2023 07:01  -  02 Oct 2023 09:40  --------------------------------------------------------  IN:    Furosemide: 10 mL  Total IN: 10 mL    OUT:    Indwelling Catheter - Urethral (mL): 674 mL  Total OUT: 674 mL    Total NET: -664 mL      < from: US Duplex Venous Lower Ext Complete, Bilateral (10.02.23 @ 01:22) >    ACC: [id number] EXAM:  US DPLX LWR EXT VEINS COMPL BI   ORDERED BY: CALVIN GAMBOA     PROCEDURE DATE:  10/02/2023          INTERPRETATION:  CLINICAL INFORMATION: Bilateral lower extremity   swelling, long flight    COMPARISON: None available.    TECHNIQUE: Duplex sonography of the BILATERAL LOWER extremity veins with   color and spectral Doppler, with and without compression.    FINDINGS:    RIGHT:  Normal compressibility of the RIGHT common femoral, femoral and popliteal   veins.  Doppler examination shows normal spontaneous and phasic flow.  No RIGHT calf vein thrombosis is detected. The soleal and gastrocnemius   veins are not visualized.    LEFT:  Normal compressibility of the LEFT common femoral, femoral and popliteal   veins.  Doppler examination shows normal spontaneous and phasic flow.  No LEFT calf vein thrombosis is detected. The soleal and gastrocnemius   veins are not visualized.    IMPRESSION:  No evidence of deep venous thrombosis in either lower extremity.            --- End of Report ---            RIMMA MELGOZA MD; Attending Radiologist  This document has been electronically signed. Oct  2 2023  2:22AM    < end of copied text >

## 2023-10-02 NOTE — DIETITIAN INITIAL EVALUATION ADULT - SIGNS/SYMPTOMS
evidenced by CKD IV, /Creat 6.1/Phos 8.0, IV lasix rx possible HD, uncontrolled DM, BS >200 as evidenced by BMI 34.8

## 2023-10-02 NOTE — DIETITIAN INITIAL EVALUATION ADULT - NUTRITIONGOAL OUTCOME2
Pt to comply with rx therapeutic diet for gradual intentional weight loss when medically appropriate.

## 2023-10-02 NOTE — DISCHARGE NOTE PROVIDER - NSDCCPCAREPLAN_GEN_ALL_CORE_FT
PRINCIPAL DISCHARGE DIAGNOSIS  Diagnosis: Acute kidney injury superimposed on CKD  Assessment and Plan of Treatment: You have chronic kidney disease. You have volume overload causing SOB. You were treated with IV diuretic medications. You were evaluated by Nephrology. You improved during your hospitalization.  START TAKING torsemide 80 mg twice per day (a prescription was sent to your pharmacy)  START TAKING Calcium acetate 1334 mg 3 times per day with meals (a prescription was sent to your pharmacy)  CONTINUE TAKING Sodium Bicarb 650 mg twice per day  FOLLOW UP with your nephrologist within 1 week.   FOLLOW UP with you PCP within 1 week  You had a Quantiferon test which is "Indeterminant" which requires further workup with your PCP to rule out TB.  YOU MUST RETURN TO THE HOSPITAL If you start to have trouble breathing, shortness of breath, chest pain, increased weight gain, increased leg swelling, or if any other symptoms worsen.      SECONDARY DISCHARGE DIAGNOSES  Diagnosis: HTN (hypertension)  Assessment and Plan of Treatment: Your blood pressure was elevated during the hospitalization. You were started on a new medication Labetalol for blood pressure.  START TAKING Labetalol 20 mg three times per day for hypertension (a prescription was sent to your pharmacy).  STOP TAKING your home medication Amlodipine, as this can increase swelling in your legs.   FOLLOW UP with you PCP within 1 week      Diagnosis: Anemia secondary to renal failure  Assessment and Plan of Treatment: You have chronic anemia, and your hemoglobin dropped to 6.8 during the hospitalization, without any active bleeding.  You were given 1 unit packed red blood cells.  FOLLOW UP with you PCP within 1 week    Diagnosis: Acute pulmonary edema  Assessment and Plan of Treatment: You had trouble breathing, likely due to volume overload from kidney failure. You were treated with IV Lasix.  FOLLOW UP with you PCP within 1 week      Diagnosis: Insulin dependent type 2 diabetes mellitus  Assessment and Plan of Treatment: This is a chronic problem for you. You were given insulin during your hospitalization.  Resume home medications and insulin.  FOLLOW UP with you PCP within 1 week     PRINCIPAL DISCHARGE DIAGNOSIS  Diagnosis: Acute kidney injury superimposed on CKD  Assessment and Plan of Treatment: You have chronic kidney disease. You have volume overload causing SOB. You were treated with IV diuretic medications. You were evaluated by Nephrology. You improved during your hospitalization.  START TAKING torsemide 80 mg twice per day (a prescription was sent to your pharmacy)  START TAKING Calcium acetate 1334 mg 3 times per day with meals (a prescription was sent to your pharmacy)  CONTINUE TAKING Sodium Bicarb 650 mg twice per day  FOLLOW UP with your nephrologist within 1 week.   FOLLOW UP with your PCP within 1 week  You had a Quantiferon test which is "Indeterminant" which requires further workup with your PCP to rule out TB, including repeat QUantiferon.  YOU MUST RETURN TO THE HOSPITAL If you start to have trouble breathing, shortness of breath, chest pain, increased weight gain, increased leg swelling, or if any other symptoms worsen.      SECONDARY DISCHARGE DIAGNOSES  Diagnosis: HTN (hypertension)  Assessment and Plan of Treatment: Your blood pressure was elevated during the hospitalization. You were started on a new medication Labetalol for blood pressure.  START TAKING Labetalol 20 mg three times per day for hypertension (a prescription was sent to your pharmacy).  STOP TAKING your home medication Amlodipine, as this can increase swelling in your legs.   FOLLOW UP with your PCP within 1 week  FOLLOW UP with your cardiologist within 1 week.      Diagnosis: Anemia secondary to renal failure  Assessment and Plan of Treatment: You have chronic anemia, and your hemoglobin dropped to 6.8 during the hospitalization, without any active bleeding.  You were given 1 unit packed red blood cells.  FOLLOW UP with your PCP within 1 week    Diagnosis: Acute pulmonary edema  Assessment and Plan of Treatment: You had trouble breathing, likely due to volume overload from kidney failure. You were treated with IV Lasix.  FOLLOW UP with your PCP within 1 week      Diagnosis: Insulin dependent type 2 diabetes mellitus  Assessment and Plan of Treatment: This is a chronic problem for you. You were given insulin during your hospitalization.  Resume home medications and insulin.  FOLLOW UP with your PCP within 1 week

## 2023-10-02 NOTE — PROGRESS NOTE ADULT - ASSESSMENT
Pt is a 42 y/o M pmhx of DM2 on insulin w/ diabetic retinopathy/neuropahy, CKD stage IV on transplant list, HTN, HLD, HFpEF on torsemide who presented to  ED for SOB. Was placed on BiPAP in ED w/ improvement, transferred to [facility] for possible HD.     1. Acute hypoxic respiratory failure   2. GABI on CKD   3. Renal failure?   4. HFpEF   5. Pulmonary edema   6. Transaminitis     Plan:   Neuro: Awake and alert at baseline, avoid sedating medications.       CV: HFpEF, suspected volume overload, diuresed w/ 60 IVP lasix in ED. Continue to monitor and maintain MAP>65.       Pulm: Acute hypoxic respiratory failure secondary to pulmonary edema in volume overloaded state, on BiPAP for respiratory support and Positive pressure ventilation.       GI: Diet: NPO while on BiPAP.       Renal: GABI on CKD, volume overloaded, diuresed w/ 60 IVP lasix, may need eventual dialysis for volume overload.       ID: Possible sepsis component to GABI, cultures sent, started on zosyn, will follow cultures and narrow as indicated, trend markers of infection daily.   + Enterorhinovirus      Endo: Glucose<180, mag>2, K>4 for arrythmia suppression.       Heme: Heparin for DVT PPX   - LE doppler neg for DVT   Pt is a 44 y/o M pmhx of DM2 on insulin w/ diabetic retinopathy/neuropahy, CKD stage IV on transplant list, HTN, HLD, HFpEF on torsemide who presented to  ED for SOB. Was placed on BiPAP in ED w/ improvement, transferred to [facility] for possible HD.     1. Acute hypoxic respiratory failure   2. GABI on CKD   3. Renal failure  4. HFpEF   5. Pulmonary edema   6. Transaminitis     Plan:   Neuro: Awake and alert at baseline, avoid sedating medications.       CV: HFpEF, suspected volume overload  - continue lasix gtt  - start labetalol 200 mg PO q8 hr for HTN      Pulm: Acute hypoxic respiratory failure secondary to pulmonary edema  - wean off BiPAP, continue NC  - Positive for enterorhinovirus      GI: Diet: NPO while on BiPAP.       Renal: GABI on CKD likely 2/2 diabetic nephropathy  - continue lasix gtt  - Nephro Dr. Coulter consulted, no need for HD at this time  - FU 1 pm BMP, Mag, phos, ionized Ca      ID: There was concern for sepsis component to GABI, and pt was started on zosyn in ED.  - low concern for infectious cause  - DC zosyn  - FU BCx (from [facility], MRN [id number])  - U/A neg, FU UCx  - positive for Enterorhinovirus  - Procal 1.2  - FU Quantiferon  - FU hepatitis panel      Endo: Glucose<180, mag>2, K>4 for arrythmia suppression.   - FU Hgb A1C  - continue Lantus 5 u qhs, ISS      Heme: - anemia, likely baseline  - continue Heparin for DVT PPX   - D dimer elevated  - LE doppler neg for DVT

## 2023-10-02 NOTE — DIETITIAN INITIAL EVALUATION ADULT - PERTINENT LABORATORY DATA
10-02    136  |  101  |  111<H>  ----------------------------<  205<H>  4.3   |  21<L>  |  6.10<H>    Ca    6.7<L>      02 Oct 2023 05:55  Phos  8.0     10-02  Mg     1.9     10-02    POCT Blood Glucose.: 217 mg/dL (10-02-23 @ 05:27)

## 2023-10-02 NOTE — DIETITIAN INITIAL EVALUATION ADULT - PROBLEM SELECTOR PLAN 1
Hypoxic on presentation requiring NIPPV with BiLevel PAP   - CXR: b/l patchy opacities R>L; multifocal pneumonia vs asymmetric pulmonary edema  - elevated proBNP 21k; covid negative  - Recent travel to [facility], [facility], [place], and [place] with longest flight being 18 hours; has had subjective fevers, cough, and sob since returning 4d ago  - AHRF likely 2/2 PNA vs acute pulmonary edema from CHF/GABI on CKD vs possible PE  - Got  IV lasix 40mg [facility] ER + 60mg [facility] ER  - Nephrology recs IV Lasix 80 mg q8-12h vs Lasix gtt and assess for HD in AM  - trial diuretics + NIPPV overnight while in ICU  - continue IV antibx rocephin/azithro for possible PNA & follow up culture data  - check d-dimer, PCT, urinary ag, RVP, quanteferon TB  - Check 2D ECHO, LE dopplers to r/o DVT given recent long flight  - F/u ID and Cardiology consults

## 2023-10-02 NOTE — DISCHARGE NOTE PROVIDER - NSDCMRMEDTOKEN_GEN_ALL_CORE_FT
acetaminophen-oxyCODONE 325 mg-5 mg oral tablet: 2 tab(s) orally every 6 hours, As Needed, Moderate Pain (4 - 6) MDD:8  amLODIPine 10 mg oral tablet: 1 tab(s) orally once a day  Coreg 3.125 mg oral tablet: 1 tab(s) orally 2 times a day  Crestor 10 mg oral tablet: 1 tab(s) orally once a day (at bedtime)  glucometer: 1 application intradermal 3 times a day (before meals)  insulin lispro 100 units/mL subcutaneous solution: 8 unit(s) subcutaneous 3 times a day (before meals). Please dipense the Pens. Our online prescritiion writer doesn&#x27;t have them to choose   Januvia 25 mg oral tablet: 1 tab(s) orally once a day  Lancets: 1 unit(s) subcutaneous 4 times a day  Lantus 100 units/mL subcutaneous solution: 7 unit(s) subcutaneous once a day (at bedtime)  Lantus Solostar Pen 100 units/mL subcutaneous solution: 25 unit(s) subcutaneous prn  metFORMIN 1000 mg oral tablet: 1 tab(s) orally 2 times a day (with meals)  sodium bicarbonate 650 mg oral tablet: 1 tab(s) orally 3 times a day  test strips: 1 unit(s) subcutaneous 4 times a day  torsemide 40 mg oral tablet: 2 tab(s) orally once a day alternate between 80mg and 160mg qd   acetaminophen-oxyCODONE 325 mg-5 mg oral tablet: 2 tab(s) orally every 6 hours, As Needed, Moderate Pain (4 - 6) MDD:8  Calphron 667 mg oral tablet: 2 tab(s) orally 3 times a day with meals  Crestor 10 mg oral tablet: 1 tab(s) orally once a day (at bedtime)  glucometer: 1 application intradermal 3 times a day (before meals)  insulin lispro 100 units/mL subcutaneous solution: 8 unit(s) subcutaneous 3 times a day (before meals). Please dipense the Pens. Our online prescritiion writer doesn&#x27;t have them to choose   Januvia 25 mg oral tablet: 1 tab(s) orally once a day  labetalol 200 mg oral tablet: 1 tab(s) orally every 8 hours Take one pill 3 times per day for high blood pressure  Lancets: 1 unit(s) subcutaneous 4 times a day  Lantus 100 units/mL subcutaneous solution: 7 unit(s) subcutaneous once a day (at bedtime)  Lantus Solostar Pen 100 units/mL subcutaneous solution: 25 unit(s) subcutaneous prn  metFORMIN 1000 mg oral tablet: 1 tab(s) orally 2 times a day (with meals)  sodium bicarbonate 650 mg oral tablet: 1 tab(s) orally 2 times a day  test strips: 1 unit(s) subcutaneous 4 times a day  torsemide 40 mg oral tablet: 2 tab(s) orally 2 times a day   acetaminophen-oxyCODONE 325 mg-5 mg oral tablet: 2 tab(s) orally every 6 hours, As Needed, Moderate Pain (4 - 6) MDD:8  Calphron 667 mg oral tablet: 2 tab(s) orally 3 times a day with meals  Crestor 10 mg oral tablet: 1 tab(s) orally once a day (at bedtime)  glucometer: 1 application intradermal 3 times a day (before meals)  insulin lispro 100 units/mL subcutaneous solution: 8 unit(s) subcutaneous 3 times a day (before meals). Please dipense the Pens. Our online prescritiion writer doesn&#x27;t have them to choose   Januvia 25 mg oral tablet: 1 tab(s) orally once a day  labetalol 200 mg oral tablet: 1 tab(s) orally every 8 hours Take one pill 3 times per day for high blood pressure  Lancets: 1 unit(s) subcutaneous 4 times a day  Lantus 100 units/mL subcutaneous solution: 7 unit(s) subcutaneous once a day (at bedtime)  metFORMIN 1000 mg oral tablet: 1 tab(s) orally 2 times a day (with meals)  sodium bicarbonate 650 mg oral tablet: 1 tab(s) orally 2 times a day  test strips: 1 unit(s) subcutaneous 4 times a day  torsemide 40 mg oral tablet: 2 tab(s) orally 2 times a day

## 2023-10-02 NOTE — DIETITIAN INITIAL EVALUATION ADULT - PERTINENT MEDS FT
MEDICATIONS  (STANDING):  chlorhexidine 2% Cloths 1 Application(s) Topical <User Schedule>  dextrose 5%. 1000 milliLiter(s) (100 mL/Hr) IV Continuous <Continuous>  dextrose 5%. 1000 milliLiter(s) (50 mL/Hr) IV Continuous <Continuous>  dextrose 50% Injectable 12.5 Gram(s) IV Push once  dextrose 50% Injectable 25 Gram(s) IV Push once  dextrose 50% Injectable 25 Gram(s) IV Push once  furosemide Infusion 10 mG/Hr (5 mL/Hr) IV Continuous <Continuous>  glucagon  Injectable 1 milliGRAM(s) IntraMuscular once  heparin   Injectable 5000 Unit(s) SubCutaneous every 8 hours  influenza   Vaccine 0.5 milliLiter(s) IntraMuscular once  insulin glargine Injectable (LANTUS) 5 Unit(s) SubCutaneous at bedtime  insulin lispro (ADMELOG) corrective regimen sliding scale   SubCutaneous every 6 hours  pantoprazole  Injectable 40 milliGRAM(s) IV Push daily  piperacillin/tazobactam IVPB.- 3.375 Gram(s) IV Intermittent once  piperacillin/tazobactam IVPB.- 3.375 Gram(s) IV Intermittent once  piperacillin/tazobactam IVPB.. 3.375 Gram(s) IV Intermittent every 12 hours    MEDICATIONS  (PRN):  dextrose Oral Gel 15 Gram(s) Oral once PRN Blood Glucose LESS THAN 70 milliGRAM(s)/deciliter

## 2023-10-03 LAB
A1C WITH ESTIMATED AVERAGE GLUCOSE RESULT: 5.9 % — HIGH (ref 4–5.6)
ALBUMIN SERPL ELPH-MCNC: 2.2 G/DL — LOW (ref 3.3–5)
ALP SERPL-CCNC: 58 U/L — SIGNIFICANT CHANGE UP (ref 40–120)
ALT FLD-CCNC: 217 U/L — HIGH (ref 12–78)
ANION GAP SERPL CALC-SCNC: 12 MMOL/L — SIGNIFICANT CHANGE UP (ref 5–17)
ANION GAP SERPL CALC-SCNC: 12 MMOL/L — SIGNIFICANT CHANGE UP (ref 5–17)
AST SERPL-CCNC: 68 U/L — HIGH (ref 15–37)
BASOPHILS # BLD AUTO: 0.01 K/UL — SIGNIFICANT CHANGE UP (ref 0–0.2)
BASOPHILS NFR BLD AUTO: 0.1 % — SIGNIFICANT CHANGE UP (ref 0–2)
BILIRUB SERPL-MCNC: 0.2 MG/DL — SIGNIFICANT CHANGE UP (ref 0.2–1.2)
BUN SERPL-MCNC: 116 MG/DL — HIGH (ref 7–23)
BUN SERPL-MCNC: 121 MG/DL — HIGH (ref 7–23)
CALCIUM SERPL-MCNC: 7.6 MG/DL — LOW (ref 8.5–10.1)
CALCIUM SERPL-MCNC: 7.9 MG/DL — LOW (ref 8.5–10.1)
CHLORIDE SERPL-SCNC: 100 MMOL/L — SIGNIFICANT CHANGE UP (ref 96–108)
CHLORIDE SERPL-SCNC: 99 MMOL/L — SIGNIFICANT CHANGE UP (ref 96–108)
CO2 SERPL-SCNC: 24 MMOL/L — SIGNIFICANT CHANGE UP (ref 22–31)
CO2 SERPL-SCNC: 24 MMOL/L — SIGNIFICANT CHANGE UP (ref 22–31)
CREAT SERPL-MCNC: 6.4 MG/DL — HIGH (ref 0.5–1.3)
CREAT SERPL-MCNC: 6.4 MG/DL — HIGH (ref 0.5–1.3)
CULTURE RESULTS: NO GROWTH — SIGNIFICANT CHANGE UP
EGFR: 10 ML/MIN/1.73M2 — LOW
EGFR: 10 ML/MIN/1.73M2 — LOW
EOSINOPHIL # BLD AUTO: 0 K/UL — SIGNIFICANT CHANGE UP (ref 0–0.5)
EOSINOPHIL NFR BLD AUTO: 0 % — SIGNIFICANT CHANGE UP (ref 0–6)
ESTIMATED AVERAGE GLUCOSE: 123 MG/DL — HIGH (ref 68–114)
FERRITIN SERPL-MCNC: 1275 NG/ML — HIGH (ref 30–400)
GAMMA INTERFERON BACKGROUND BLD IA-ACNC: 0.03 IU/ML — SIGNIFICANT CHANGE UP
GLUCOSE SERPL-MCNC: 165 MG/DL — HIGH (ref 70–99)
GLUCOSE SERPL-MCNC: 188 MG/DL — HIGH (ref 70–99)
HBV CORE AB SER-ACNC: SIGNIFICANT CHANGE UP
HBV SURFACE AB SER-ACNC: 142.5 MIU/ML — SIGNIFICANT CHANGE UP
HBV SURFACE AB SER-ACNC: REACTIVE
HBV SURFACE AG SER-ACNC: SIGNIFICANT CHANGE UP
HCT VFR BLD CALC: 21.4 % — LOW (ref 39–50)
HCV AB S/CO SERPL IA: 0.06 S/CO — SIGNIFICANT CHANGE UP (ref 0–0.99)
HCV AB SERPL-IMP: SIGNIFICANT CHANGE UP
HGB BLD-MCNC: 6.8 G/DL — CRITICAL LOW (ref 13–17)
IMM GRANULOCYTES NFR BLD AUTO: 0.7 % — SIGNIFICANT CHANGE UP (ref 0–0.9)
IRON SATN MFR SERPL: 17 % — SIGNIFICANT CHANGE UP (ref 16–55)
IRON SATN MFR SERPL: 38 UG/DL — LOW (ref 45–165)
LYMPHOCYTES # BLD AUTO: 0.45 K/UL — LOW (ref 1–3.3)
LYMPHOCYTES # BLD AUTO: 3.7 % — LOW (ref 13–44)
M TB IFN-G BLD-IMP: ABNORMAL
M TB IFN-G CD4+ BCKGRND COR BLD-ACNC: -0.01 IU/ML — SIGNIFICANT CHANGE UP
M TB IFN-G CD4+CD8+ BCKGRND COR BLD-ACNC: -0.01 IU/ML — SIGNIFICANT CHANGE UP
MAGNESIUM SERPL-MCNC: 2 MG/DL — SIGNIFICANT CHANGE UP (ref 1.6–2.6)
MAGNESIUM SERPL-MCNC: 2 MG/DL — SIGNIFICANT CHANGE UP (ref 1.6–2.6)
MCHC RBC-ENTMCNC: 23.5 PG — LOW (ref 27–34)
MCHC RBC-ENTMCNC: 31.8 GM/DL — LOW (ref 32–36)
MCV RBC AUTO: 74 FL — LOW (ref 80–100)
MONOCYTES # BLD AUTO: 1 K/UL — HIGH (ref 0–0.9)
MONOCYTES NFR BLD AUTO: 8.3 % — SIGNIFICANT CHANGE UP (ref 2–14)
NEUTROPHILS # BLD AUTO: 10.49 K/UL — HIGH (ref 1.8–7.4)
NEUTROPHILS NFR BLD AUTO: 87.2 % — HIGH (ref 43–77)
NIGHT BLUE STAIN TISS: SIGNIFICANT CHANGE UP
NRBC # BLD: 0 /100 WBCS — SIGNIFICANT CHANGE UP (ref 0–0)
PHOSPHATE SERPL-MCNC: 8.7 MG/DL — HIGH (ref 2.5–4.5)
PHOSPHATE SERPL-MCNC: 9 MG/DL — HIGH (ref 2.5–4.5)
PLATELET # BLD AUTO: 273 K/UL — SIGNIFICANT CHANGE UP (ref 150–400)
POTASSIUM SERPL-MCNC: 4.4 MMOL/L — SIGNIFICANT CHANGE UP (ref 3.5–5.3)
POTASSIUM SERPL-MCNC: 4.4 MMOL/L — SIGNIFICANT CHANGE UP (ref 3.5–5.3)
POTASSIUM SERPL-SCNC: 4.4 MMOL/L — SIGNIFICANT CHANGE UP (ref 3.5–5.3)
POTASSIUM SERPL-SCNC: 4.4 MMOL/L — SIGNIFICANT CHANGE UP (ref 3.5–5.3)
PROT SERPL-MCNC: 6.3 G/DL — SIGNIFICANT CHANGE UP (ref 6–8.3)
QUANT TB PLUS MITOGEN MINUS NIL: -0.01 IU/ML — SIGNIFICANT CHANGE UP
RBC # BLD: 2.89 M/UL — LOW (ref 4.2–5.8)
RBC # FLD: 15.3 % — HIGH (ref 10.3–14.5)
SODIUM SERPL-SCNC: 135 MMOL/L — SIGNIFICANT CHANGE UP (ref 135–145)
SODIUM SERPL-SCNC: 136 MMOL/L — SIGNIFICANT CHANGE UP (ref 135–145)
SPECIMEN SOURCE: SIGNIFICANT CHANGE UP
SPECIMEN SOURCE: SIGNIFICANT CHANGE UP
TIBC SERPL-MCNC: 220 UG/DL — SIGNIFICANT CHANGE UP (ref 220–430)
UIBC SERPL-MCNC: 182 UG/DL — SIGNIFICANT CHANGE UP (ref 110–370)
URATE SERPL-MCNC: 12.7 MG/DL — HIGH (ref 3.4–8.8)
WBC # BLD: 12.04 K/UL — HIGH (ref 3.8–10.5)
WBC # FLD AUTO: 12.04 K/UL — HIGH (ref 3.8–10.5)

## 2023-10-03 PROCEDURE — 99232 SBSQ HOSP IP/OBS MODERATE 35: CPT

## 2023-10-03 PROCEDURE — 99232 SBSQ HOSP IP/OBS MODERATE 35: CPT | Mod: GC

## 2023-10-03 RX ORDER — LABETALOL HCL 100 MG
200 TABLET ORAL EVERY 8 HOURS
Refills: 0 | Status: DISCONTINUED | OUTPATIENT
Start: 2023-10-03 | End: 2023-10-05

## 2023-10-03 RX ORDER — INSULIN GLARGINE 100 [IU]/ML
7 INJECTION, SOLUTION SUBCUTANEOUS AT BEDTIME
Refills: 0 | Status: DISCONTINUED | OUTPATIENT
Start: 2023-10-03 | End: 2023-10-05

## 2023-10-03 RX ORDER — INSULIN LISPRO 100/ML
6 VIAL (ML) SUBCUTANEOUS
Refills: 0 | Status: DISCONTINUED | OUTPATIENT
Start: 2023-10-03 | End: 2023-10-03

## 2023-10-03 RX ADMIN — Medication 1: at 12:22

## 2023-10-03 RX ADMIN — Medication 200 MILLIGRAM(S): at 06:14

## 2023-10-03 RX ADMIN — Medication 1334 MILLIGRAM(S): at 09:07

## 2023-10-03 RX ADMIN — INSULIN GLARGINE 7 UNIT(S): 100 INJECTION, SOLUTION SUBCUTANEOUS at 21:44

## 2023-10-03 RX ADMIN — Medication 200 MILLIGRAM(S): at 14:18

## 2023-10-03 RX ADMIN — Medication 2: at 06:23

## 2023-10-03 RX ADMIN — Medication 200 MILLIGRAM(S): at 14:26

## 2023-10-03 RX ADMIN — HEPARIN SODIUM 5000 UNIT(S): 5000 INJECTION INTRAVENOUS; SUBCUTANEOUS at 14:18

## 2023-10-03 RX ADMIN — Medication 1334 MILLIGRAM(S): at 12:55

## 2023-10-03 RX ADMIN — Medication 1334 MILLIGRAM(S): at 17:17

## 2023-10-03 RX ADMIN — HEPARIN SODIUM 5000 UNIT(S): 5000 INJECTION INTRAVENOUS; SUBCUTANEOUS at 06:15

## 2023-10-03 RX ADMIN — Medication 650 MILLIGRAM(S): at 06:14

## 2023-10-03 RX ADMIN — HEPARIN SODIUM 5000 UNIT(S): 5000 INJECTION INTRAVENOUS; SUBCUTANEOUS at 21:40

## 2023-10-03 RX ADMIN — Medication 650 MILLIGRAM(S): at 17:16

## 2023-10-03 RX ADMIN — Medication 200 MILLIGRAM(S): at 21:41

## 2023-10-03 RX ADMIN — CHLORHEXIDINE GLUCONATE 1 APPLICATION(S): 213 SOLUTION TOPICAL at 06:14

## 2023-10-03 RX ADMIN — Medication 6 UNIT(S): at 12:21

## 2023-10-03 NOTE — PROGRESS NOTE ADULT - SUBJECTIVE AND OBJECTIVE BOX
INTERVAL HPI/OVERNIGHT EVENTS: This AM pt's Hgb 6.8, he was given 1 u pRBC.    SUBJECTIVE: Patient seen and examined at bedside. He states that his breathing feels improved. He denies HA, dizziness, lightheadedness, CP, abd pain.    ROS:  CV: Denies chest pain  Resp: Denies SOB  GI: Denies abdominal pain, constipation, diarrhea, nausea, vomiting  ID: Denies fevers, chills      OBJECTIVE:    VITAL SIGNS:  ICU Vital Signs Last 24 Hrs  T(C): 36.7 (03 Oct 2023 08:12), Max: 37 (02 Oct 2023 20:00)  T(F): 98 (03 Oct 2023 08:12), Max: 98.6 (02 Oct 2023 20:00)  HR: 83 (03 Oct 2023 10:30) (76 - 101)  BP: 152/87 (03 Oct 2023 10:30) (126/67 - 173/95)  BP(mean): 113 (03 Oct 2023 10:30) (90 - 127)  ABP: --  ABP(mean): --  RR: 17 (03 Oct 2023 10:30) (7 - 21)  SpO2: 99% (03 Oct 2023 10:30) (94% - 100%)    O2 Parameters below as of 03 Oct 2023 08:00  Patient On (Oxygen Delivery Method): nasal cannula  O2 Flow (L/min): 2            10-02 @ 07:01  -  10-03 @ 07:00  --------------------------------------------------------  IN: 290 mL / OUT: 5269 mL / NET: -4979 mL    10-03 @ 07:01  -  10-03 @ 11:28  --------------------------------------------------------  IN: 15 mL / OUT: 575 mL / NET: -560 mL      CAPILLARY BLOOD GLUCOSE      POCT Blood Glucose.: 203 mg/dL (03 Oct 2023 06:19)      PHYSICAL EXAM:  General: NAD, appears comfortable  HEENT: NCAT  Respiratory: RLL rhonchi  Cardiovascular: RRR, normal S1S2, no M/R/G  Abdomen: soft, NT/ND  Extremities: 2+ pitting LE edema. no calf tenderness  Neurology: awake and alert    MEDICATIONS:  MEDICATIONS  (STANDING):  calcium acetate 1334 milliGRAM(s) Oral three times a day with meals  chlorhexidine 2% Cloths 1 Application(s) Topical <User Schedule>  dextrose 5%. 1000 milliLiter(s) (50 mL/Hr) IV Continuous <Continuous>  dextrose 5%. 1000 milliLiter(s) (100 mL/Hr) IV Continuous <Continuous>  dextrose 50% Injectable 12.5 Gram(s) IV Push once  dextrose 50% Injectable 25 Gram(s) IV Push once  dextrose 50% Injectable 25 Gram(s) IV Push once  epoetin zulay (PROCRIT) Injectable 57319 Unit(s) SubCutaneous <User Schedule>  furosemide Infusion 10 mG/Hr (5 mL/Hr) IV Continuous <Continuous>  glucagon  Injectable 1 milliGRAM(s) IntraMuscular once  heparin   Injectable 5000 Unit(s) SubCutaneous every 8 hours  influenza   Vaccine 0.5 milliLiter(s) IntraMuscular once  insulin glargine Injectable (LANTUS) 7 Unit(s) SubCutaneous at bedtime  insulin lispro (ADMELOG) corrective regimen sliding scale   SubCutaneous every 6 hours  insulin lispro Injectable (ADMELOG) 6 Unit(s) SubCutaneous three times a day before meals  labetalol 200 milliGRAM(s) Oral every 8 hours  sodium bicarbonate 650 milliGRAM(s) Oral two times a day    MEDICATIONS  (PRN):  dextrose Oral Gel 15 Gram(s) Oral once PRN Blood Glucose LESS THAN 70 milliGRAM(s)/deciliter      ALLERGIES:  Allergies    No Known Allergies    Intolerances        LABS:                        6.8    12.04 )-----------( 273      ( 03 Oct 2023 05:41 )             21.4     10-03    136  |  100  |  116<H>  ----------------------------<  188<H>  4.4   |  24  |  6.40<H>    Ca    7.6<L>      03 Oct 2023 05:41  Phos  9.0     10-03  Mg     2.0     10-03    TPro  6.3  /  Alb  2.2<L>  /  TBili  0.2  /  DBili  x   /  AST  68<H>  /  ALT  217<H>  /  AlkPhos  58  10-03    PT/INR - ( 01 Oct 2023 18:23 )   PT: 16.0 sec;   INR: 1.48 ratio         PTT - ( 01 Oct 2023 18:23 )  PTT:31.4 sec  Urinalysis Basic - ( 03 Oct 2023 05:41 )    Color: x / Appearance: x / SG: x / pH: x  Gluc: 188 mg/dL / Ketone: x  / Bili: x / Urobili: x   Blood: x / Protein: x / Nitrite: x   Leuk Esterase: x / RBC: x / WBC x   Sq Epi: x / Non Sq Epi: x / Bacteria: x        RADIOLOGY & ADDITIONAL TESTS:   < from: TTE W or WO Ultrasound Enhancing Agent (10.02.23 @ 07:16) >    TRANSTHORACIC ECHOCARDIOGRAM REPORT  ________________________________________________________________________________                                      _______       Pt. Name:       MARIFER MILLER    Study Date:    10/2/2023  MRN:            [id number]  YOB: 1980  Accession #:    [id number]   Age:           43 years  Account#:       [id number]  Gender:        M  Heart Rate:                 Height:        74.02 in (188.00 cm)  Rhythm:                     Weight:        279.98 lb (127.00 kg)  Blood Pressure: 155/90 mmHg BSA/BMI:       2.51 m² / 35.93 kg/m²  ________________________________________________________________________________________  Referring Physician:    [id number] Andres Pritchett  Interpreting Physician: Luiza Lemus  Primary Sonographer:    Armand Zambrano    CPT:               ECHO TTE WO CON COMP W DOPP - 22223.m  Indication(s):     Heart failure, unspecified - I50.9  Procedure:         Transthoracic echocardiogram with 2-D, M-mode and complete  spectral and color flow Doppler.  Ordering Location: ICU1  Study Information: Image quality for this study is technically difficult.    _______________________________________________________________________________________     CONCLUSIONS:      1. Technically difficult image quality.   2. Left ventricular systolic function is normal with an ejection fraction of 54 % by Oneill's method of disks.   3. There is normal LV mass and concentric remodeling.   4. Normal right ventricular cavity size andsystolic function.   5. The left atrium is severely dilated in size.   6. Thickened mitral valve leaflets.   7. Moderate mitral regurgitation. There are multiple MR jets noted. The degree of MR may be underestimated in this study.   8. Trileaflet aortic valve with normal systolic excursion.   9. Structurally normal tricuspid valve with normal leaflet excursion. Mild-moderate tricuspid regurgitation.  10. Estimated pulmonary artery systolic pressure is 47 mmHg, consistent with mild pulmonary hypertension.  11. The inferior vena cava is dilated measuring 2.23 cm in diameter, (dilated >2.1cm) with abnormal inspiratory collapse (abnormal <50%) consistent with elevated right atrial pressure (~15, range 10-20mmHg).  12. Bilateral pleural effusion noted.    < end of copied text >      CENTRAL LINE: N        DATE INSERTED:             REMOVE: N  DOLAN: Y                       DATE INSERTED:  10/2/23            REMOVE: N  A-LINE: N                       DATE INSERTED:              REMOVE:       GLOBAL ISSUE/BEST PRACTICE  Analgesia: N  Sedation: N  HOB elevation: yes  Stress ulcer prophylaxis: Y  VTE prophylaxis: Y  Glycemic control: Y  Nutrition: Y    CODE STATUS: Full Code

## 2023-10-03 NOTE — PROGRESS NOTE ADULT - ASSESSMENT
Pt is a 42 y/o M pmhx of DM2 on insulin w/ diabetic retinopathy/neuropahy, CKD stage IV on transplant list, HTN, HLD, HFpEF on torsemide who presented to  ED for SOB. Was placed on BiPAP in ED w/ improvement, transferred to [facility] for possible HD.     1. Acute hypoxic respiratory failure   2. GABI on CKD   3. Renal failure  4. HFpEF   5. Pulmonary edema   6. Transaminitis     Plan:   Neuro: Awake and alert at baseline, avoid sedating medications.       CV: HFpEF, suspected volume overload  - TTE: LVEF 54%, LA severely dilated, moderate MR with multiple MR jets, mild-moderate TR, Dilated IVC  - continue lasix gtt  - continue labetalol 200 mg PO q8 hr for HTN      Pulm: Acute hypoxic respiratory failure secondary to pulmonary edema, improving  - continue NC, wean as tolerated  - Positive for enterorhinovirus      GI: Diet: continue renal diet  - LFTs downtrending       Renal: GABI on CKD likely 2/2 diabetic nephropathy  - continue lasix gtt  - Nephro Dr. Coulter consulted, will discuss plan for HD with pt's outpatient nephrologist  - electrolytes stable      ID: - FU BCx (from [facility], MRN [id number])  - U/A neg, UCx NGTD  - positive for Enterorhinovirus  - Procal 1.2  - FU Quantiferon  - hepatitis panel negative, + for Hep B surface antibody      Endo: Glucose elevated 180-230s  - Hgb A1C 6.0, pt takes insulin at home  - increase to Lantus 7 u qhs, Admelog 6 u premeal TID, and ISS      Heme: - anemia of chronic disease  - 10/3: Hgb 6.8, pt given 1 unit pRBC  - no active bleeding  - FU iron studies  - continue Heparin for DVT PPX   - D dimer elevated  - LE doppler negative for DVT

## 2023-10-03 NOTE — PROGRESS NOTE ADULT - SUBJECTIVE AND OBJECTIVE BOX
Patient is a 43y old  Male who presents with a chief complaint of Acute hypoxic respiratory failure (03 Oct 2023 11:44)    Patient seen in follow up for CKD 5.        PAST MEDICAL HISTORY:  Type 2 diabetes mellitus with complication, without long-term current use of insulin    Insulin dependent type 2 diabetes mellitus    Stage 4 chronic kidney disease    HTN (hypertension)    HLD (hyperlipidemia)    History of diabetic retinopathy      MEDICATIONS  (STANDING):  calcium acetate 1334 milliGRAM(s) Oral three times a day with meals  chlorhexidine 2% Cloths 1 Application(s) Topical <User Schedule>  dextrose 5%. 1000 milliLiter(s) (50 mL/Hr) IV Continuous <Continuous>  dextrose 5%. 1000 milliLiter(s) (100 mL/Hr) IV Continuous <Continuous>  dextrose 50% Injectable 12.5 Gram(s) IV Push once  dextrose 50% Injectable 25 Gram(s) IV Push once  dextrose 50% Injectable 25 Gram(s) IV Push once  epoetin zulay (PROCRIT) Injectable 28809 Unit(s) SubCutaneous <User Schedule>  furosemide Infusion 10 mG/Hr (5 mL/Hr) IV Continuous <Continuous>  glucagon  Injectable 1 milliGRAM(s) IntraMuscular once  heparin   Injectable 5000 Unit(s) SubCutaneous every 8 hours  influenza   Vaccine 0.5 milliLiter(s) IntraMuscular once  insulin glargine Injectable (LANTUS) 7 Unit(s) SubCutaneous at bedtime  insulin lispro (ADMELOG) corrective regimen sliding scale   SubCutaneous every 6 hours  insulin lispro Injectable (ADMELOG) 6 Unit(s) SubCutaneous three times a day before meals  labetalol 200 milliGRAM(s) Oral every 8 hours  sodium bicarbonate 650 milliGRAM(s) Oral two times a day    MEDICATIONS  (PRN):  dextrose Oral Gel 15 Gram(s) Oral once PRN Blood Glucose LESS THAN 70 milliGRAM(s)/deciliter    T(C): 36.7 (10-03-23 @ 08:12), Max: 37 (10-02-23 @ 20:00)  HR: 80 (10-03-23 @ 11:30) (76 - 101)  BP: 144/80 (10-03-23 @ 11:30) (126/67 - 173/95)  RR: 22 (10-03-23 @ 11:30) (7 - 22)  SpO2: 100% (10-03-23 @ 11:30) (94% - 100%)  Wt(kg): --  I&O's Detail    02 Oct 2023 07:01  -  03 Oct 2023 07:00  --------------------------------------------------------  IN:    Furosemide: 90 mL    IV PiggyBack: 100 mL    IV PiggyBack: 100 mL  Total IN: 290 mL    OUT:    Indwelling Catheter - Urethral (mL): 5269 mL  Total OUT: 5269 mL    Total NET: -4979 mL      03 Oct 2023 07:01  -  03 Oct 2023 11:50  --------------------------------------------------------  IN:    Furosemide: 15 mL  Total IN: 15 mL    OUT:    Indwelling Catheter - Urethral (mL): 575 mL  Total OUT: 575 mL    Total NET: -560 mL          PHYSICAL EXAM:  General: No distress  Respiratory: b/l air entry  Cardiovascular: S1 S2  Gastrointestinal: soft  Extremities: + edema                              6.8    12.04 )-----------( 273      ( 03 Oct 2023 05:41 )             21.4     10-03    136  |  100  |  116<H>  ----------------------------<  188<H>  4.4   |  24  |  6.40<H>    Ca    7.6<L>      03 Oct 2023 05:41  Phos  9.0     10-03  Mg     2.0     10-03    TPro  6.3  /  Alb  2.2<L>  /  TBili  0.2  /  DBili  x   /  AST  68<H>  /  ALT  217<H>  /  AlkPhos  58  10-03    CARDIAC MARKERS ( 02 Oct 2023 05:55 )  x     / x     / 1768 U/L / x     / 9.6 ng/mL  CARDIAC MARKERS ( 01 Oct 2023 21:24 )  x     / x     / 2666 U/L / x     / 16.2 ng/mL      LIVER FUNCTIONS - ( 03 Oct 2023 05:41 )  Alb: 2.2 g/dL / Pro: 6.3 g/dL / ALK PHOS: 58 U/L / ALT: 217 U/L / AST: 68 U/L / GGT: x           Urinalysis Basic - ( 03 Oct 2023 05:41 )    Color: x / Appearance: x / SG: x / pH: x  Gluc: 188 mg/dL / Ketone: x  / Bili: x / Urobili: x   Blood: x / Protein: x / Nitrite: x   Leuk Esterase: x / RBC: x / WBC x   Sq Epi: x / Non Sq Epi: x / Bacteria: x      ABG - ( 01 Oct 2023 21:01 )  pH, Arterial: 7.44  pH, Blood: x     /  pCO2: 33    /  pO2: 91    / HCO3: 22    / Base Excess: -1.8  /  SaO2: 97.4              Sodium, Serum: 136 (10-03 @ 05:41)  Sodium, Serum: 137 (10-02 @ 13:43)  Sodium, Serum: 136 (10-02 @ 05:55)  Sodium, Serum: 133 (10-01 @ 18:23)    Creatinine, Serum: 6.40 (10-03 @ 05:41)  Creatinine, Serum: 6.20 (10-02 @ 13:43)  Creatinine, Serum: 6.10 (10-02 @ 05:55)  Creatinine, Serum: 5.99 (10-01 @ 18:23)    Potassium, Serum: 4.4 (10-03 @ 05:41)  Potassium, Serum: 4.2 (10-02 @ 13:43)  Potassium, Serum: 4.3 (10-02 @ 05:55)  Potassium, Serum: 5.0 (10-01 @ 18:23)    Hemoglobin: 6.8 (10-03 @ 05:41)  Hemoglobin: 7.0 (10-02 @ 05:55)  Hemoglobin: 7.5 (10-01 @ 21:06)  Hemoglobin: 7.0 (10-01 @ 18:23)

## 2023-10-03 NOTE — PROGRESS NOTE ADULT - ASSESSMENT
CKD 5, Diabetic nephropathy  Dyspnea: Viral illness, ? fluid overload  Anemia  Diabetes  Hyperphosphatemia    10/02/23: Progression of CKD. On lasix drip. Monitor UO. No indication for urgent hemodialysis. Add procrit. Check iron studies.   Add calcium acetate when started on PO feeds. Monitor blood sugar levels. Insulin coverage as needed.   ICU management. Will follow electrolytes and renal function trend. Avoid nephrotoxic meds as possible. Avoid ACEI, ARB, NSAIDs and IV contrast.   10/03/23: Clinically better. Good diuresis. Rising creatinine trend with mandated diuresis. Will monitor for 24 hours. D/w pt regarding possible need for HD in the next 24-48 hours vs out pt follow up with pt's nephrologist Dr. Paniagua to initiate HD/PD as out pt. ICU management.

## 2023-10-03 NOTE — PROGRESS NOTE ADULT - SUBJECTIVE AND OBJECTIVE BOX
Patient is a 43y old  Male who presents with a chief complaint of Acute hypoxic respiratory failure (03 Oct 2023 11:27)      INTERVAL HPI/OVERNIGHT EVENTS: Patient seen and examined at bedside. s/p 1 unit pRBC for anemia  On 3L NC    MEDICATIONS  (STANDING):  calcium acetate 1334 milliGRAM(s) Oral three times a day with meals  chlorhexidine 2% Cloths 1 Application(s) Topical <User Schedule>  dextrose 5%. 1000 milliLiter(s) (50 mL/Hr) IV Continuous <Continuous>  dextrose 5%. 1000 milliLiter(s) (100 mL/Hr) IV Continuous <Continuous>  dextrose 50% Injectable 12.5 Gram(s) IV Push once  dextrose 50% Injectable 25 Gram(s) IV Push once  dextrose 50% Injectable 25 Gram(s) IV Push once  epoetin zulay (PROCRIT) Injectable 01723 Unit(s) SubCutaneous <User Schedule>  furosemide Infusion 10 mG/Hr (5 mL/Hr) IV Continuous <Continuous>  glucagon  Injectable 1 milliGRAM(s) IntraMuscular once  heparin   Injectable 5000 Unit(s) SubCutaneous every 8 hours  influenza   Vaccine 0.5 milliLiter(s) IntraMuscular once  insulin glargine Injectable (LANTUS) 7 Unit(s) SubCutaneous at bedtime  insulin lispro (ADMELOG) corrective regimen sliding scale   SubCutaneous every 6 hours  insulin lispro Injectable (ADMELOG) 6 Unit(s) SubCutaneous three times a day before meals  labetalol 200 milliGRAM(s) Oral every 8 hours  sodium bicarbonate 650 milliGRAM(s) Oral two times a day    MEDICATIONS  (PRN):  dextrose Oral Gel 15 Gram(s) Oral once PRN Blood Glucose LESS THAN 70 milliGRAM(s)/deciliter      Allergies    No Known Allergies    Intolerances        Vital Signs Last 24 Hrs  T(C): 36.7 (03 Oct 2023 08:12), Max: 37 (02 Oct 2023 20:00)  T(F): 98 (03 Oct 2023 08:12), Max: 98.6 (02 Oct 2023 20:00)  HR: 80 (03 Oct 2023 11:30) (76 - 101)  BP: 144/80 (03 Oct 2023 11:30) (126/67 - 173/95)  BP(mean): 106 (03 Oct 2023 11:30) (90 - 127)  RR: 22 (03 Oct 2023 11:30) (7 - 22)  SpO2: 100% (03 Oct 2023 11:30) (94% - 100%)    Parameters below as of 03 Oct 2023 11:00  Patient On (Oxygen Delivery Method): nasal cannula  O2 Flow (L/min): 2    I&O's Summary    02 Oct 2023 07:01  -  03 Oct 2023 07:00  --------------------------------------------------------  IN: 290 mL / OUT: 5269 mL / NET: -4979 mL    03 Oct 2023 07:01  -  03 Oct 2023 11:44  --------------------------------------------------------  IN: 15 mL / OUT: 575 mL / NET: -560 mL      BMI (kg/m2): 35.9 (10-01-23 @ 22:12), 44.9 (10-01-23 @ 18:23)    PHYSICAL EXAM:  GENERAL: NAD, on 3L NC  HEENT:  AT/NC, anicteric, dry mucous membranes, EOMI, PERRL, conjunctiva and sclera clear  CHEST/LUNG: diminished breath sounds at bases, poor inspiratory effort, rhonchi present  HEART:  RRR, S1, S2, no murmurs; 2+ pitting edema  ABDOMEN:  BS+, soft, nontender, nondistended, obese  MSK/EXTREMITIES: palpable peripheral pulses, no clubbing or cyanosis  NERVOUS SYSTEM: A&Ox3 grossly moves all extremities   PSYCH: Appropriate affect, Alert & Awake; fair judgement      LABS: Personally reviewed  CBC                        6.8    12.04 )-----------( 273      ( 03 Oct 2023 05:41 )             21.4     CMP  10-03    136  |  100  |  116  ----------------------------<  188  4.4   |  24  |  6.40    Ca    7.6      03 Oct 2023 05:41  Phos  9.0     10-03  Mg     2.0     10-03    TPro  6.3  /  Alb  2.2  /  TBili  0.2  /  DBili  x   /  AST  68  /  ALT  217  /  AlkPhos  58  10-03          PT/INR - ( 01 Oct 2023 18:23 )   PT: 16.0 sec;   INR: 1.48 ratio         PTT - ( 01 Oct 2023 18:23 )  PTT:31.4 sec  Lactate, Blood: 0.9 mmol/L (10-01 @ 21:24)  Lactate, Blood: 0.9 mmol/L (10-01 @ 19:15)    Procalcitonin, Serum: 1.22 ng/mL (10-02-23 @ 05:55)    CARDIAC MARKERS ( 02 Oct 2023 05:55 )  x     / 76.7 ng/L / 1768 U/L / x     / 9.6 ng/mL  CARDIAC MARKERS ( 01 Oct 2023 21:24 )  x     / 91.9 ng/L / 2666 U/L / x     / 16.2 ng/mL  CARDIAC MARKERS ( 01 Oct 2023 18:23 )  x     / 83.0 ng/L / x     / x     / x                ABG - ( 01 Oct 2023 21:01 )  pH, Arterial: 7.44  pH, Blood: x     /  pCO2: 33    /  pO2: 91    / HCO3: 22    / Base Excess: -1.8  /  SaO2: 97.4                    POCT Blood Glucose.: 203 mg/dL (03 Oct 2023 06:19)  POCT Blood Glucose.: 230 mg/dL (02 Oct 2023 23:28)  POCT Blood Glucose.: 231 mg/dL (02 Oct 2023 22:15)  POCT Blood Glucose.: 239 mg/dL (02 Oct 2023 17:48)      Urinalysis Basic - ( 03 Oct 2023 05:41 )    Color: x / Appearance: x / SG: x / pH: x  Gluc: 188 mg/dL / Ketone: x  / Bili: x / Urobili: x   Blood: x / Protein: x / Nitrite: x   Leuk Esterase: x / RBC: x / WBC x   Sq Epi: x / Non Sq Epi: x / Bacteria: x        Culture - Urine (collected 01 Oct 2023 22:45)  Source: Catheterized Catheterized  Final Report (03 Oct 2023 06:49):    No growth            Culture - Urine (collected 10-01-23 @ 22:45)  Source: Catheterized Catheterized  Final Report (10-03-23 @ 06:49):    No growth        RADIOLOGY & ADDITIONAL TESTS: Personally reviewed.     Consultant(s) Notes Reviewed:  [x] YES  [ ] NO   Discussed with STEFANY/TINA, RN

## 2023-10-04 ENCOUNTER — NON-APPOINTMENT (OUTPATIENT)
Age: 43
End: 2023-10-04

## 2023-10-04 LAB
ALBUMIN SERPL ELPH-MCNC: 2.4 G/DL — LOW (ref 3.3–5)
ALP SERPL-CCNC: 59 U/L — SIGNIFICANT CHANGE UP (ref 40–120)
ALT FLD-CCNC: 170 U/L — HIGH (ref 12–78)
ANION GAP SERPL CALC-SCNC: 13 MMOL/L — SIGNIFICANT CHANGE UP (ref 5–17)
AST SERPL-CCNC: 40 U/L — HIGH (ref 15–37)
BASOPHILS # BLD AUTO: 0.01 K/UL — SIGNIFICANT CHANGE UP (ref 0–0.2)
BASOPHILS NFR BLD AUTO: 0.1 % — SIGNIFICANT CHANGE UP (ref 0–2)
BILIRUB SERPL-MCNC: 0.4 MG/DL — SIGNIFICANT CHANGE UP (ref 0.2–1.2)
BUN SERPL-MCNC: 116 MG/DL — HIGH (ref 7–23)
CALCIUM SERPL-MCNC: 8.2 MG/DL — LOW (ref 8.5–10.1)
CHLORIDE SERPL-SCNC: 99 MMOL/L — SIGNIFICANT CHANGE UP (ref 96–108)
CO2 SERPL-SCNC: 25 MMOL/L — SIGNIFICANT CHANGE UP (ref 22–31)
CREAT SERPL-MCNC: 6.5 MG/DL — HIGH (ref 0.5–1.3)
EGFR: 10 ML/MIN/1.73M2 — LOW
EOSINOPHIL # BLD AUTO: 0.04 K/UL — SIGNIFICANT CHANGE UP (ref 0–0.5)
EOSINOPHIL NFR BLD AUTO: 0.3 % — SIGNIFICANT CHANGE UP (ref 0–6)
GLUCOSE SERPL-MCNC: 127 MG/DL — HIGH (ref 70–99)
HCT VFR BLD CALC: 25.3 % — LOW (ref 39–50)
HGB BLD-MCNC: 8.1 G/DL — LOW (ref 13–17)
IMM GRANULOCYTES NFR BLD AUTO: 0.5 % — SIGNIFICANT CHANGE UP (ref 0–0.9)
LYMPHOCYTES # BLD AUTO: 0.91 K/UL — LOW (ref 1–3.3)
LYMPHOCYTES # BLD AUTO: 7.9 % — LOW (ref 13–44)
MAGNESIUM SERPL-MCNC: 2.1 MG/DL — SIGNIFICANT CHANGE UP (ref 1.6–2.6)
MCHC RBC-ENTMCNC: 24 PG — LOW (ref 27–34)
MCHC RBC-ENTMCNC: 32 GM/DL — SIGNIFICANT CHANGE UP (ref 32–36)
MCV RBC AUTO: 74.9 FL — LOW (ref 80–100)
MONOCYTES # BLD AUTO: 1.23 K/UL — HIGH (ref 0–0.9)
MONOCYTES NFR BLD AUTO: 10.6 % — SIGNIFICANT CHANGE UP (ref 2–14)
NEUTROPHILS # BLD AUTO: 9.32 K/UL — HIGH (ref 1.8–7.4)
NEUTROPHILS NFR BLD AUTO: 80.6 % — HIGH (ref 43–77)
NRBC # BLD: 0 /100 WBCS — SIGNIFICANT CHANGE UP (ref 0–0)
PHOSPHATE SERPL-MCNC: 8.9 MG/DL — HIGH (ref 2.5–4.5)
PLATELET # BLD AUTO: 286 K/UL — SIGNIFICANT CHANGE UP (ref 150–400)
POTASSIUM SERPL-MCNC: 4.1 MMOL/L — SIGNIFICANT CHANGE UP (ref 3.5–5.3)
POTASSIUM SERPL-SCNC: 4.1 MMOL/L — SIGNIFICANT CHANGE UP (ref 3.5–5.3)
PROT SERPL-MCNC: 6.4 G/DL — SIGNIFICANT CHANGE UP (ref 6–8.3)
RBC # BLD: 3.38 M/UL — LOW (ref 4.2–5.8)
RBC # FLD: 15.3 % — HIGH (ref 10.3–14.5)
SODIUM SERPL-SCNC: 137 MMOL/L — SIGNIFICANT CHANGE UP (ref 135–145)
WBC # BLD: 11.57 K/UL — HIGH (ref 3.8–10.5)
WBC # FLD AUTO: 11.57 K/UL — HIGH (ref 3.8–10.5)

## 2023-10-04 PROCEDURE — 99232 SBSQ HOSP IP/OBS MODERATE 35: CPT

## 2023-10-04 PROCEDURE — 99232 SBSQ HOSP IP/OBS MODERATE 35: CPT | Mod: GC

## 2023-10-04 RX ORDER — HEPARIN SODIUM 5000 [USP'U]/ML
5000 INJECTION INTRAVENOUS; SUBCUTANEOUS EVERY 8 HOURS
Refills: 0 | Status: COMPLETED | OUTPATIENT
Start: 2023-10-04 | End: 2023-10-04

## 2023-10-04 RX ORDER — PETROLATUM,WHITE
1 JELLY (GRAM) TOPICAL THREE TIMES A DAY
Refills: 0 | Status: DISCONTINUED | OUTPATIENT
Start: 2023-10-04 | End: 2023-10-05

## 2023-10-04 RX ADMIN — HEPARIN SODIUM 5000 UNIT(S): 5000 INJECTION INTRAVENOUS; SUBCUTANEOUS at 21:47

## 2023-10-04 RX ADMIN — Medication 1334 MILLIGRAM(S): at 08:22

## 2023-10-04 RX ADMIN — Medication 2.5 MG/HR: at 08:22

## 2023-10-04 RX ADMIN — Medication 5 MG/HR: at 06:52

## 2023-10-04 RX ADMIN — Medication 200 MILLIGRAM(S): at 23:55

## 2023-10-04 RX ADMIN — Medication 1: at 00:00

## 2023-10-04 RX ADMIN — Medication 200 MILLIGRAM(S): at 14:09

## 2023-10-04 RX ADMIN — Medication 1: at 12:09

## 2023-10-04 RX ADMIN — Medication 80 MILLIGRAM(S): at 14:09

## 2023-10-04 RX ADMIN — Medication 650 MILLIGRAM(S): at 17:34

## 2023-10-04 RX ADMIN — HEPARIN SODIUM 5000 UNIT(S): 5000 INJECTION INTRAVENOUS; SUBCUTANEOUS at 05:08

## 2023-10-04 RX ADMIN — HEPARIN SODIUM 5000 UNIT(S): 5000 INJECTION INTRAVENOUS; SUBCUTANEOUS at 14:09

## 2023-10-04 RX ADMIN — CHLORHEXIDINE GLUCONATE 1 APPLICATION(S): 213 SOLUTION TOPICAL at 05:14

## 2023-10-04 RX ADMIN — INSULIN GLARGINE 7 UNIT(S): 100 INJECTION, SOLUTION SUBCUTANEOUS at 21:48

## 2023-10-04 RX ADMIN — Medication 1334 MILLIGRAM(S): at 12:07

## 2023-10-04 RX ADMIN — Medication 1 APPLICATION(S): at 17:34

## 2023-10-04 RX ADMIN — Medication 650 MILLIGRAM(S): at 05:08

## 2023-10-04 RX ADMIN — Medication 1334 MILLIGRAM(S): at 17:34

## 2023-10-04 RX ADMIN — Medication 200 MILLIGRAM(S): at 05:09

## 2023-10-04 NOTE — PROGRESS NOTE ADULT - SUBJECTIVE AND OBJECTIVE BOX
Patient is a 43y old  Male who presents with a chief complaint of Acute hypoxic respiratory failure (03 Oct 2023 11:44)    Patient seen in follow up for CKD 5.        PAST MEDICAL HISTORY:  Type 2 diabetes mellitus with complication, without long-term current use of insulin    Insulin dependent type 2 diabetes mellitus    Stage 4 chronic kidney disease    HTN (hypertension)    HLD (hyperlipidemia)    History of diabetic retinopathy      MEDICATIONS  (STANDING):  calcium acetate 1334 milliGRAM(s) Oral three times a day with meals  chlorhexidine 2% Cloths 1 Application(s) Topical <User Schedule>  dextrose 5%. 1000 milliLiter(s) (50 mL/Hr) IV Continuous <Continuous>  dextrose 5%. 1000 milliLiter(s) (100 mL/Hr) IV Continuous <Continuous>  dextrose 50% Injectable 12.5 Gram(s) IV Push once  dextrose 50% Injectable 25 Gram(s) IV Push once  dextrose 50% Injectable 25 Gram(s) IV Push once  epoetin zulay (PROCRIT) Injectable 08484 Unit(s) SubCutaneous <User Schedule>  glucagon  Injectable 1 milliGRAM(s) IntraMuscular once  heparin   Injectable 5000 Unit(s) SubCutaneous every 8 hours  influenza   Vaccine 0.5 milliLiter(s) IntraMuscular once  insulin glargine Injectable (LANTUS) 7 Unit(s) SubCutaneous at bedtime  insulin lispro (ADMELOG) corrective regimen sliding scale   SubCutaneous every 6 hours  labetalol 200 milliGRAM(s) Oral every 8 hours  sodium bicarbonate 650 milliGRAM(s) Oral two times a day  torsemide 80 milliGRAM(s) Oral two times a day    MEDICATIONS  (PRN):  dextrose Oral Gel 15 Gram(s) Oral once PRN Blood Glucose LESS THAN 70 milliGRAM(s)/deciliter    T(C): 36.1 (10-04-23 @ 09:00), Max: 37 (10-02-23 @ 20:00)  HR: 78 (10-04-23 @ 07:00) (76 - 101)  BP: 145/83 (10-04-23 @ 07:00) (126/67 - 173/95)  RR: 16 (10-04-23 @ 07:00)  SpO2: 95% (10-04-23 @ 07:00)  Wt(kg): --  I&O's Detail    03 Oct 2023 07:01  -  04 Oct 2023 07:00  --------------------------------------------------------  IN:    Furosemide: 120 mL    Oral Fluid: 711 mL    PRBCs (Packed Red Blood Cells): 282 mL  Total IN: 1113 mL    OUT:    Indwelling Catheter - Urethral (mL): 5170 mL  Total OUT: 5170 mL    Total NET: -4057 mL      04 Oct 2023 07:01  -  04 Oct 2023 10:54  --------------------------------------------------------  IN:    Furosemide: 2.5 mL  Total IN: 2.5 mL    OUT:  Total OUT: 0 mL    Total NET: 2.5 mL              PHYSICAL EXAM:  General: No distress  Respiratory: b/l air entry  Cardiovascular: S1 S2  Gastrointestinal: soft  Extremities: + edema                              LABORATORY:                        8.1    11.57 )-----------( 286      ( 04 Oct 2023 05:42 )             25.3     10-04    137  |  99  |  116<H>  ----------------------------<  127<H>  4.1   |  25  |  6.50<H>    Ca    8.2<L>      04 Oct 2023 05:42  Phos  8.9     10-04  Mg     2.1     10-04    TPro  6.4  /  Alb  2.4<L>  /  TBili  0.4  /  DBili  x   /  AST  40<H>  /  ALT  170<H>  /  AlkPhos  59  10-04    Sodium: 137 mmol/L (10-04 @ 05:42)  Sodium: 135 mmol/L (10-03 @ 14:21)  Sodium: 136 mmol/L (10-03 @ 05:41)  Sodium: 137 mmol/L (10-02 @ 13:43)    Potassium: 4.1 mmol/L (10-04 @ 05:42)  Potassium: 4.4 mmol/L (10-03 @ 14:21)  Potassium: 4.4 mmol/L (10-03 @ 05:41)  Potassium: 4.2 mmol/L (10-02 @ 13:43)    Hemoglobin: 8.1 g/dL (10-04 @ 05:42)  Hemoglobin: 6.8 g/dL (10-03 @ 05:41)  Hemoglobin: 7.0 g/dL (10-02 @ 05:55)  Hemoglobin: 7.5 g/dL (10-01 @ 21:06)    Creatinine, Serum 6.50 (10-04 @ 05:42)  Creatinine, Serum 6.40 (10-03 @ 14:21)  Creatinine, Serum 6.40 (10-03 @ 05:41)  Creatinine, Serum 6.20 (10-02 @ 13:43)        LIVER FUNCTIONS - ( 04 Oct 2023 05:42 )  Alb: 2.4 g/dL / Pro: 6.4 g/dL / ALK PHOS: 59 U/L / ALT: 170 U/L / AST: 40 U/L / GGT: x           Urinalysis Basic - ( 04 Oct 2023 05:42 )    Color: x / Appearance: x / SG: x / pH: x  Gluc: 127 mg/dL / Ketone: x  / Bili: x / Urobili: x   Blood: x / Protein: x / Nitrite: x   Leuk Esterase: x / RBC: x / WBC x   Sq Epi: x / Non Sq Epi: x / Bacteria: x

## 2023-10-04 NOTE — PROGRESS NOTE ADULT - ASSESSMENT
CKD 5, Diabetic nephropathy  Dyspnea: Viral illness, ? fluid overload  Anemia  Diabetes  Hyperphosphatemia    10/02/23: Progression of CKD. On lasix drip. Monitor UO. No indication for urgent hemodialysis. Add procrit. Check iron studies.   Add calcium acetate when started on PO feeds. Monitor blood sugar levels. Insulin coverage as needed.   ICU management. Will follow electrolytes and renal function trend. Avoid nephrotoxic meds as possible. Avoid ACEI, ARB, NSAIDs and IV contrast.   10/03/23: Clinically better. Good diuresis. Rising creatinine trend with mandated diuresis. Will monitor for 24 hours. D/w pt regarding possible need for HD in the next 24-48 hours vs out pt follow up with pt's nephrologist Dr. Paniagua to initiate HD/PD as out pt. ICU management.   10/04/23: Stable renal indices. Good diuresis. Fluid status much better. PO fluid restriction. D/w pt and Dr. Paniagua today. Will plan for d/c if stable on PO diuretics if not will initiate HD tomorrow.   IR contacted for possible perma cath placement tomorrow. D/w ICU team.

## 2023-10-04 NOTE — PROGRESS NOTE ADULT - SUBJECTIVE AND OBJECTIVE BOX
INTERVAL HPI/OVERNIGHT EVENTS: Pt weaned off of O2, sating well on RA. he has urine output 5 L x 24 hours. Lasix gtt was decreased to 5 mL/hr.    SUBJECTIVE: Patient seen and examined at bedside. He states his SOB has improved, and still occasionally has cough. Denies CP, abd pain.    ROS:  CV: Denies chest pain  Resp: Denies SOB  GI: Denies abdominal pain, constipation, diarrhea, nausea, vomiting  ID: Denies fevers, chills  MSK: Denies joint pain     OBJECTIVE:    VITAL SIGNS:  ICU Vital Signs Last 24 Hrs  T(C): 36.1 (04 Oct 2023 09:00), Max: 36.9 (03 Oct 2023 16:41)  T(F): 97 (04 Oct 2023 09:00), Max: 98.4 (03 Oct 2023 16:41)  HR: 78 (04 Oct 2023 07:00) (76 - 88)  BP: 145/83 (04 Oct 2023 07:00) (130/76 - 162/79)  BP(mean): 105 (04 Oct 2023 07:00) (94 - 119)  ABP: --  ABP(mean): --  RR: 16 (04 Oct 2023 07:00) (11 - 26)  SpO2: 95% (04 Oct 2023 07:00) (92% - 100%)    O2 Parameters below as of 04 Oct 2023 07:00  Patient On (Oxygen Delivery Method): room air              10-03 @ 07:01  -  10-04 @ 07:00  --------------------------------------------------------  IN: 1113 mL / OUT: 5170 mL / NET: -4057 mL    10-04 @ 07:01  -  10-04 @ 10:09  --------------------------------------------------------  IN: 2.5 mL / OUT: 0 mL / NET: 2.5 mL      CAPILLARY BLOOD GLUCOSE      POCT Blood Glucose.: 126 mg/dL (04 Oct 2023 05:12)      PHYSICAL EXAM:  General: NAD, comfortable, on RA  HEENT: NCAT  Respiratory: CTA, no wheezes, rales, rhonchi  Cardiovascular: RRR, normal S1S2, no M/R/G  Abdomen: soft, NT/ND  Extremities: 2+ pitting LE edema. no calf tenderness  Neurology: awake and alert    MEDICATIONS:  MEDICATIONS  (STANDING):  calcium acetate 1334 milliGRAM(s) Oral three times a day with meals  chlorhexidine 2% Cloths 1 Application(s) Topical <User Schedule>  dextrose 5%. 1000 milliLiter(s) (50 mL/Hr) IV Continuous <Continuous>  dextrose 5%. 1000 milliLiter(s) (100 mL/Hr) IV Continuous <Continuous>  dextrose 50% Injectable 12.5 Gram(s) IV Push once  dextrose 50% Injectable 25 Gram(s) IV Push once  dextrose 50% Injectable 25 Gram(s) IV Push once  epoetin zulay (PROCRIT) Injectable 64066 Unit(s) SubCutaneous <User Schedule>  furosemide Infusion 5 mG/Hr (2.5 mL/Hr) IV Continuous <Continuous>  glucagon  Injectable 1 milliGRAM(s) IntraMuscular once  heparin   Injectable 5000 Unit(s) SubCutaneous every 8 hours  influenza   Vaccine 0.5 milliLiter(s) IntraMuscular once  insulin glargine Injectable (LANTUS) 7 Unit(s) SubCutaneous at bedtime  insulin lispro (ADMELOG) corrective regimen sliding scale   SubCutaneous every 6 hours  labetalol 200 milliGRAM(s) Oral every 8 hours  sodium bicarbonate 650 milliGRAM(s) Oral two times a day    MEDICATIONS  (PRN):  dextrose Oral Gel 15 Gram(s) Oral once PRN Blood Glucose LESS THAN 70 milliGRAM(s)/deciliter      ALLERGIES:  Allergies    No Known Allergies    Intolerances        LABS:                        8.1    11.57 )-----------( 286      ( 04 Oct 2023 05:42 )             25.3     10-04    137  |  99  |  116<H>  ----------------------------<  127<H>  4.1   |  25  |  6.50<H>    Ca    8.2<L>      04 Oct 2023 05:42  Phos  8.9     10-04  Mg     2.1     10-04    TPro  6.4  /  Alb  2.4<L>  /  TBili  0.4  /  DBili  x   /  AST  40<H>  /  ALT  170<H>  /  AlkPhos  59  10-04      Urinalysis Basic - ( 04 Oct 2023 05:42 )    Color: x / Appearance: x / SG: x / pH: x  Gluc: 127 mg/dL / Ketone: x  / Bili: x / Urobili: x   Blood: x / Protein: x / Nitrite: x   Leuk Esterase: x / RBC: x / WBC x   Sq Epi: x / Non Sq Epi: x / Bacteria: x        RADIOLOGY & ADDITIONAL TESTS: N/A      CENTRAL LINE: N        DATE INSERTED:             REMOVE: N  DOLAN: Y                       DATE INSERTED:  10/2/23            REMOVE: N  A-LINE: N                       DATE INSERTED:              REMOVE:       GLOBAL ISSUE/BEST PRACTICE  Analgesia: N  Sedation: N  HOB elevation: yes  Stress ulcer prophylaxis: Y  VTE prophylaxis: Y  Glycemic control: Y  Nutrition: Y    CODE STATUS: Full Code

## 2023-10-04 NOTE — PROGRESS NOTE ADULT - ASSESSMENT
Pt is a 42 y/o M pmhx of DM2 on insulin w/ diabetic retinopathy/neuropahy, CKD stage IV on transplant list, HTN, HLD, HFpEF on torsemide, transferred from [facility] ED for possible HD, with SOB requiring BiPAP. Admitted to ICU for acute hypoxic respiratory failure 2/2 pulmonary edema, and renal failure with GABI on CKD.     Plan:   Neuro: Awake and alert at baseline      CV: HFpEF, suspected volume overload, improving  - TTE: LVEF 54%, LA severely dilated, moderate MR with multiple MR jets, mild-moderate TR, Dilated IVC  - decrease lasix gtt to 5 mL/hr  - continue labetalol 200 mg PO q8 hr for HTN      Pulm: Acute hypoxic respiratory failure secondary to pulmonary edema, resolved  - tolerating RA  - Positive for enterorhinovirus      GI: Diet: continue renal diet  - LFTs downtrending       Renal: GABI on CKD likely 2/2 diabetic nephropathy  - Pt has had profuse urine output, 5 L x 24 hours. decrease lasix gtt to 5 mL/hr  - Nephro Dr. Coulter consulted, will discuss plan for HD with pt's outpatient nephrologist  - electrolytes stable      ID: - BCx NGTD  - U/A neg, UCx NGTD  - positive for Enterorhinovirus  - Sputum Cx: no acid fast bacilli  - Quantiferon: indeterminate  - hepatitis panel negative, + for Hep B surface antibody      Endo: Glucose within goal range  - Hgb A1C 6.0, pt takes insulin at home  - continue Lantus 7 u qhs, Admelog 6 u premeal TID, and ISS      Heme: - anemia of chronic disease  - 10/3: Hgb 6.8, pt given 1 unit pRBC  - Hgb increased appropriately  - no active bleeding  - FU iron studies  - continue Heparin for DVT PPX   - D dimer elevated  - LE doppler negative for DVT

## 2023-10-04 NOTE — PROGRESS NOTE ADULT - SUBJECTIVE AND OBJECTIVE BOX
Patient is a 43y old  Male who presents with a chief complaint of Acute hypoxic respiratory failure (03 Oct 2023 11:49)      INTERVAL HPI/OVERNIGHT EVENTS: Patient seen and examined at bedside.   On supplemental o2  s/ 1 unit pRBC with appropriate increase in H/H    MEDICATIONS  (STANDING):  calcium acetate 1334 milliGRAM(s) Oral three times a day with meals  chlorhexidine 2% Cloths 1 Application(s) Topical <User Schedule>  dextrose 5%. 1000 milliLiter(s) (50 mL/Hr) IV Continuous <Continuous>  dextrose 5%. 1000 milliLiter(s) (100 mL/Hr) IV Continuous <Continuous>  dextrose 50% Injectable 12.5 Gram(s) IV Push once  dextrose 50% Injectable 25 Gram(s) IV Push once  dextrose 50% Injectable 25 Gram(s) IV Push once  epoetin zulay (PROCRIT) Injectable 98197 Unit(s) SubCutaneous <User Schedule>  furosemide Infusion 5 mG/Hr (2.5 mL/Hr) IV Continuous <Continuous>  glucagon  Injectable 1 milliGRAM(s) IntraMuscular once  heparin   Injectable 5000 Unit(s) SubCutaneous every 8 hours  influenza   Vaccine 0.5 milliLiter(s) IntraMuscular once  insulin glargine Injectable (LANTUS) 7 Unit(s) SubCutaneous at bedtime  insulin lispro (ADMELOG) corrective regimen sliding scale   SubCutaneous every 6 hours  labetalol 200 milliGRAM(s) Oral every 8 hours  sodium bicarbonate 650 milliGRAM(s) Oral two times a day    MEDICATIONS  (PRN):  dextrose Oral Gel 15 Gram(s) Oral once PRN Blood Glucose LESS THAN 70 milliGRAM(s)/deciliter      Allergies    No Known Allergies    Intolerances        REVIEW OF SYSTEMS:  CONSTITUTIONAL: No fever or chills  CARDIOVASCULAR: No chest pain, palpitations  GASTROINTESTINAL: No abd pain, nausea, vomiting, or diarrhea    Vital Signs Last 24 Hrs  T(C): 36.7 (04 Oct 2023 04:46), Max: 36.9 (03 Oct 2023 16:41)  T(F): 98 (04 Oct 2023 04:46), Max: 98.4 (03 Oct 2023 16:41)  HR: 78 (04 Oct 2023 07:00) (76 - 88)  BP: 145/83 (04 Oct 2023 07:00) (130/76 - 162/79)  BP(mean): 105 (04 Oct 2023 07:00) (94 - 119)  RR: 16 (04 Oct 2023 07:00) (11 - 26)  SpO2: 95% (04 Oct 2023 07:00) (92% - 100%)    Parameters below as of 04 Oct 2023 07:00  Patient On (Oxygen Delivery Method): nasal cannula      I&O's Summary    03 Oct 2023 07:01  -  04 Oct 2023 07:00  --------------------------------------------------------  IN: 1113 mL / OUT: 5170 mL / NET: -4057 mL      BMI (kg/m2): 35.9 (10-01-23 @ 22:12), 44.9 (10-01-23 @ 18:23)    PHYSICAL EXAM:  GENERAL: NAD, on 3L NC  HEENT:  AT/NC, anicteric, dry mucous membranes, EOMI, PERRL, conjunctiva and sclera clear  CHEST/LUNG: diminished breath sounds at bases overall cta b/l, poor inspiratory effort  HEART:  RRR, S1, S2, no murmurs; 1+ pitting edema  ABDOMEN:  BS+, soft, nontender, nondistended, obese  MSK/EXTREMITIES: palpable peripheral pulses, no clubbing or cyanosis  NERVOUS SYSTEM: A&Ox3 grossly moves all extremities   PSYCH: Appropriate affect, Alert & Awake; fair judgement    LABS: Personally reviewed  CBC                        8.1    11.57 )-----------( 286      ( 04 Oct 2023 05:42 )             25.3     CMP  10-04    137  |  99  |  116  ----------------------------<  127  4.1   |  25  |  6.50    Ca    8.2      04 Oct 2023 05:42  Phos  8.9     10-04  Mg     2.1     10-04    TPro  6.4  /  Alb  2.4  /  TBili  0.4  /  DBili  x   /  AST  40  /  ALT  170  /  AlkPhos  59  10-04          PT/INR - ( 01 Oct 2023 18:23 )   PT: 16.0 sec;   INR: 1.48 ratio         PTT - ( 01 Oct 2023 18:23 )  PTT:31.4 sec  Lactate, Blood: 0.9 mmol/L (10-01 @ 21:24)  Lactate, Blood: 0.9 mmol/L (10-01 @ 19:15)    Procalcitonin, Serum: 1.22 ng/mL (10-02-23 @ 05:55)    CARDIAC MARKERS ( 02 Oct 2023 05:55 )  x     / 76.7 ng/L / 1768 U/L / x     / 9.6 ng/mL  CARDIAC MARKERS ( 01 Oct 2023 21:24 )  x     / 91.9 ng/L / 2666 U/L / x     / 16.2 ng/mL  CARDIAC MARKERS ( 01 Oct 2023 18:23 )  x     / 83.0 ng/L / x     / x     / x                ABG - ( 01 Oct 2023 21:01 )  pH, Arterial: 7.44  pH, Blood: x     /  pCO2: 33    /  pO2: 91    / HCO3: 22    / Base Excess: -1.8  /  SaO2: 97.4                    POCT Blood Glucose.: 126 mg/dL (04 Oct 2023 05:12)  POCT Blood Glucose.: 154 mg/dL (03 Oct 2023 23:59)  POCT Blood Glucose.: 164 mg/dL (03 Oct 2023 21:40)  POCT Blood Glucose.: 141 mg/dL (03 Oct 2023 17:14)  POCT Blood Glucose.: 189 mg/dL (03 Oct 2023 12:17)      Urinalysis Basic - ( 04 Oct 2023 05:42 )    Color: x / Appearance: x / SG: x / pH: x  Gluc: 127 mg/dL / Ketone: x  / Bili: x / Urobili: x   Blood: x / Protein: x / Nitrite: x   Leuk Esterase: x / RBC: x / WBC x   Sq Epi: x / Non Sq Epi: x / Bacteria: x        Culture - Acid Fast - Sputum w/Smear (collected 02 Oct 2023 13:00)  Source: .Sputum Sputum    Culture - Urine (collected 01 Oct 2023 22:45)  Source: Catheterized Catheterized  Final Report (03 Oct 2023 06:49):    No growth    Culture - Blood (collected 01 Oct 2023 19:15)  Source: .Blood Blood  Preliminary Report (03 Oct 2023 14:02):    No growth at 24 hours    Culture - Blood (collected 01 Oct 2023 19:15)  Source: .Blood Blood  Preliminary Report (03 Oct 2023 14:02):    No growth at 24 hours            Culture - Acid Fast - Sputum w/Smear (collected 10-02-23 @ 13:00)  Source: .Sputum Sputum    Culture - Urine (collected 10-01-23 @ 22:45)  Source: Catheterized Catheterized  Final Report (10-03-23 @ 06:49):    No growth    Culture - Blood (collected 10-01-23 @ 19:15)  Source: .Blood Blood  Preliminary Report (10-03-23 @ 14:02):    No growth at 24 hours    Culture - Blood (collected 10-01-23 @ 19:15)  Source: .Blood Blood  Preliminary Report (10-03-23 @ 14:02):    No growth at 24 hours        RADIOLOGY & ADDITIONAL TESTS: Personally reviewed.     Consultant(s) Notes Reviewed:  [x] YES  [ ] NO   Discussed with SW/TINA, RN

## 2023-10-05 ENCOUNTER — TRANSCRIPTION ENCOUNTER (OUTPATIENT)
Age: 43
End: 2023-10-05

## 2023-10-05 VITALS
RESPIRATION RATE: 19 BRPM | HEART RATE: 87 BPM | OXYGEN SATURATION: 95 % | DIASTOLIC BLOOD PRESSURE: 75 MMHG | SYSTOLIC BLOOD PRESSURE: 149 MMHG

## 2023-10-05 PROBLEM — Z86.39 PERSONAL HISTORY OF OTHER ENDOCRINE, NUTRITIONAL AND METABOLIC DISEASE: Chronic | Status: ACTIVE | Noted: 2023-10-01

## 2023-10-05 PROBLEM — E11.9 TYPE 2 DIABETES MELLITUS WITHOUT COMPLICATIONS: Chronic | Status: ACTIVE | Noted: 2023-10-01

## 2023-10-05 PROBLEM — E78.5 HYPERLIPIDEMIA, UNSPECIFIED: Chronic | Status: ACTIVE | Noted: 2023-10-01

## 2023-10-05 PROBLEM — I10 ESSENTIAL (PRIMARY) HYPERTENSION: Chronic | Status: ACTIVE | Noted: 2023-10-01

## 2023-10-05 LAB
ANION GAP SERPL CALC-SCNC: 14 MMOL/L — SIGNIFICANT CHANGE UP (ref 5–17)
APTT BLD: 30.5 SEC — SIGNIFICANT CHANGE UP (ref 24.5–35.6)
BASOPHILS # BLD AUTO: 0.01 K/UL — SIGNIFICANT CHANGE UP (ref 0–0.2)
BASOPHILS NFR BLD AUTO: 0.1 % — SIGNIFICANT CHANGE UP (ref 0–2)
BUN SERPL-MCNC: 116 MG/DL — HIGH (ref 7–23)
CALCIUM SERPL-MCNC: 8.4 MG/DL — LOW (ref 8.5–10.1)
CHLORIDE SERPL-SCNC: 95 MMOL/L — LOW (ref 96–108)
CO2 SERPL-SCNC: 26 MMOL/L — SIGNIFICANT CHANGE UP (ref 22–31)
CREAT SERPL-MCNC: 6.3 MG/DL — HIGH (ref 0.5–1.3)
EGFR: 11 ML/MIN/1.73M2 — LOW
EOSINOPHIL # BLD AUTO: 0.34 K/UL — SIGNIFICANT CHANGE UP (ref 0–0.5)
EOSINOPHIL NFR BLD AUTO: 3 % — SIGNIFICANT CHANGE UP (ref 0–6)
GLUCOSE SERPL-MCNC: 91 MG/DL — SIGNIFICANT CHANGE UP (ref 70–99)
HCT VFR BLD CALC: 26.6 % — LOW (ref 39–50)
HGB BLD-MCNC: 8.4 G/DL — LOW (ref 13–17)
IMM GRANULOCYTES NFR BLD AUTO: 0.7 % — SIGNIFICANT CHANGE UP (ref 0–0.9)
INR BLD: 1.06 RATIO — SIGNIFICANT CHANGE UP (ref 0.85–1.18)
LYMPHOCYTES # BLD AUTO: 1.22 K/UL — SIGNIFICANT CHANGE UP (ref 1–3.3)
LYMPHOCYTES # BLD AUTO: 10.9 % — LOW (ref 13–44)
MAGNESIUM SERPL-MCNC: 2.2 MG/DL — SIGNIFICANT CHANGE UP (ref 1.6–2.6)
MCHC RBC-ENTMCNC: 23.7 PG — LOW (ref 27–34)
MCHC RBC-ENTMCNC: 31.6 GM/DL — LOW (ref 32–36)
MCV RBC AUTO: 74.9 FL — LOW (ref 80–100)
MONOCYTES # BLD AUTO: 1.24 K/UL — HIGH (ref 0–0.9)
MONOCYTES NFR BLD AUTO: 11.1 % — SIGNIFICANT CHANGE UP (ref 2–14)
NEUTROPHILS # BLD AUTO: 8.26 K/UL — HIGH (ref 1.8–7.4)
NEUTROPHILS NFR BLD AUTO: 74.2 % — SIGNIFICANT CHANGE UP (ref 43–77)
NRBC # BLD: 0 /100 WBCS — SIGNIFICANT CHANGE UP (ref 0–0)
PHOSPHATE SERPL-MCNC: 8.4 MG/DL — HIGH (ref 2.5–4.5)
PLATELET # BLD AUTO: 301 K/UL — SIGNIFICANT CHANGE UP (ref 150–400)
POTASSIUM SERPL-MCNC: 4.1 MMOL/L — SIGNIFICANT CHANGE UP (ref 3.5–5.3)
POTASSIUM SERPL-SCNC: 4.1 MMOL/L — SIGNIFICANT CHANGE UP (ref 3.5–5.3)
PROTHROM AB SERPL-ACNC: 12.4 SEC — SIGNIFICANT CHANGE UP (ref 9.5–13)
RBC # BLD: 3.55 M/UL — LOW (ref 4.2–5.8)
RBC # FLD: 15 % — HIGH (ref 10.3–14.5)
SODIUM SERPL-SCNC: 135 MMOL/L — SIGNIFICANT CHANGE UP (ref 135–145)
WBC # BLD: 11.15 K/UL — HIGH (ref 3.8–10.5)
WBC # FLD AUTO: 11.15 K/UL — HIGH (ref 3.8–10.5)

## 2023-10-05 PROCEDURE — 36600 WITHDRAWAL OF ARTERIAL BLOOD: CPT

## 2023-10-05 PROCEDURE — 93306 TTE W/DOPPLER COMPLETE: CPT

## 2023-10-05 PROCEDURE — 82310 ASSAY OF CALCIUM: CPT

## 2023-10-05 PROCEDURE — 82553 CREATINE MB FRACTION: CPT

## 2023-10-05 PROCEDURE — 82803 BLOOD GASES ANY COMBINATION: CPT

## 2023-10-05 PROCEDURE — 94760 N-INVAS EAR/PLS OXIMETRY 1: CPT

## 2023-10-05 PROCEDURE — 85730 THROMBOPLASTIN TIME PARTIAL: CPT

## 2023-10-05 PROCEDURE — 87641 MR-STAPH DNA AMP PROBE: CPT

## 2023-10-05 PROCEDURE — 87449 NOS EACH ORGANISM AG IA: CPT

## 2023-10-05 PROCEDURE — 83605 ASSAY OF LACTIC ACID: CPT

## 2023-10-05 PROCEDURE — 85027 COMPLETE CBC AUTOMATED: CPT

## 2023-10-05 PROCEDURE — 99232 SBSQ HOSP IP/OBS MODERATE 35: CPT

## 2023-10-05 PROCEDURE — 85025 COMPLETE CBC W/AUTO DIFF WBC: CPT

## 2023-10-05 PROCEDURE — 84100 ASSAY OF PHOSPHORUS: CPT

## 2023-10-05 PROCEDURE — 99239 HOSP IP/OBS DSCHRG MGMT >30: CPT | Mod: GC

## 2023-10-05 PROCEDURE — 93970 EXTREMITY STUDY: CPT

## 2023-10-05 PROCEDURE — 87116 MYCOBACTERIA CULTURE: CPT

## 2023-10-05 PROCEDURE — 84145 PROCALCITONIN (PCT): CPT

## 2023-10-05 PROCEDURE — 80053 COMPREHEN METABOLIC PANEL: CPT

## 2023-10-05 PROCEDURE — P9016: CPT

## 2023-10-05 PROCEDURE — 84550 ASSAY OF BLOOD/URIC ACID: CPT

## 2023-10-05 PROCEDURE — 86803 HEPATITIS C AB TEST: CPT

## 2023-10-05 PROCEDURE — 36430 TRANSFUSION BLD/BLD COMPNT: CPT

## 2023-10-05 PROCEDURE — 83540 ASSAY OF IRON: CPT

## 2023-10-05 PROCEDURE — 86706 HEP B SURFACE ANTIBODY: CPT

## 2023-10-05 PROCEDURE — 87086 URINE CULTURE/COLONY COUNT: CPT

## 2023-10-05 PROCEDURE — 71045 X-RAY EXAM CHEST 1 VIEW: CPT

## 2023-10-05 PROCEDURE — 87206 SMEAR FLUORESCENT/ACID STAI: CPT

## 2023-10-05 PROCEDURE — 99285 EMERGENCY DEPT VISIT HI MDM: CPT

## 2023-10-05 PROCEDURE — 82728 ASSAY OF FERRITIN: CPT

## 2023-10-05 PROCEDURE — 87640 STAPH A DNA AMP PROBE: CPT

## 2023-10-05 PROCEDURE — 86704 HEP B CORE ANTIBODY TOTAL: CPT

## 2023-10-05 PROCEDURE — 87340 HEPATITIS B SURFACE AG IA: CPT

## 2023-10-05 PROCEDURE — 86923 COMPATIBILITY TEST ELECTRIC: CPT

## 2023-10-05 PROCEDURE — 82550 ASSAY OF CK (CPK): CPT

## 2023-10-05 PROCEDURE — 80048 BASIC METABOLIC PNL TOTAL CA: CPT

## 2023-10-05 PROCEDURE — 83550 IRON BINDING TEST: CPT

## 2023-10-05 PROCEDURE — 36415 COLL VENOUS BLD VENIPUNCTURE: CPT

## 2023-10-05 PROCEDURE — 86850 RBC ANTIBODY SCREEN: CPT

## 2023-10-05 PROCEDURE — 86900 BLOOD TYPING SEROLOGIC ABO: CPT

## 2023-10-05 PROCEDURE — 94660 CPAP INITIATION&MGMT: CPT

## 2023-10-05 PROCEDURE — 85379 FIBRIN DEGRADATION QUANT: CPT

## 2023-10-05 PROCEDURE — 87899 AGENT NOS ASSAY W/OPTIC: CPT

## 2023-10-05 PROCEDURE — 83735 ASSAY OF MAGNESIUM: CPT

## 2023-10-05 PROCEDURE — 85610 PROTHROMBIN TIME: CPT

## 2023-10-05 PROCEDURE — 84484 ASSAY OF TROPONIN QUANT: CPT

## 2023-10-05 PROCEDURE — 86480 TB TEST CELL IMMUN MEASURE: CPT

## 2023-10-05 PROCEDURE — 82962 GLUCOSE BLOOD TEST: CPT

## 2023-10-05 PROCEDURE — 87015 SPECIMEN INFECT AGNT CONCNTJ: CPT

## 2023-10-05 PROCEDURE — 0225U NFCT DS DNA&RNA 21 SARSCOV2: CPT

## 2023-10-05 PROCEDURE — 86901 BLOOD TYPING SEROLOGIC RH(D): CPT

## 2023-10-05 PROCEDURE — 83036 HEMOGLOBIN GLYCOSYLATED A1C: CPT

## 2023-10-05 PROCEDURE — 81001 URINALYSIS AUTO W/SCOPE: CPT

## 2023-10-05 PROCEDURE — 82040 ASSAY OF SERUM ALBUMIN: CPT

## 2023-10-05 RX ORDER — CARVEDILOL PHOSPHATE 80 MG/1
1 CAPSULE, EXTENDED RELEASE ORAL
Refills: 0 | DISCHARGE

## 2023-10-05 RX ORDER — INSULIN GLARGINE 100 [IU]/ML
7 INJECTION, SOLUTION SUBCUTANEOUS
Refills: 0 | DISCHARGE

## 2023-10-05 RX ORDER — LABETALOL HCL 100 MG
1 TABLET ORAL
Qty: 90 | Refills: 0
Start: 2023-10-05 | End: 2023-11-03

## 2023-10-05 RX ORDER — INSULIN GLARGINE 100 [IU]/ML
7 INJECTION, SOLUTION SUBCUTANEOUS
Qty: 1 | Refills: 0
Start: 2023-10-05

## 2023-10-05 RX ORDER — CALCIUM ACETATE 667 MG
2 TABLET ORAL
Qty: 180 | Refills: 0
Start: 2023-10-05 | End: 2023-11-03

## 2023-10-05 RX ORDER — AMLODIPINE BESYLATE 2.5 MG/1
1 TABLET ORAL
Refills: 0 | DISCHARGE

## 2023-10-05 RX ADMIN — Medication 650 MILLIGRAM(S): at 05:21

## 2023-10-05 RX ADMIN — Medication 80 MILLIGRAM(S): at 05:20

## 2023-10-05 RX ADMIN — Medication 1: at 11:46

## 2023-10-05 RX ADMIN — CHLORHEXIDINE GLUCONATE 1 APPLICATION(S): 213 SOLUTION TOPICAL at 05:21

## 2023-10-05 RX ADMIN — Medication 1334 MILLIGRAM(S): at 12:29

## 2023-10-05 RX ADMIN — Medication 200 MILLIGRAM(S): at 05:20

## 2023-10-05 RX ADMIN — Medication 200 MILLIGRAM(S): at 13:01

## 2023-10-05 RX ADMIN — Medication 80 MILLIGRAM(S): at 13:02

## 2023-10-05 NOTE — PROGRESS NOTE ADULT - PROBLEM SELECTOR PLAN 6
Hgb has been trending down from ~10 in Feb to 8 in September  - Suspect anemia of CKD  - iron studies, b12, folate, FOBT, T&S performed   - No signs/sx of active bleeding at this time  -Transfuse as needed
Hgb has been trending down from ~10 in Feb to 8 in September  - Suspect anemia of CKD  - iron studies, b12, folate, FOBT, T&S performed   - No signs/sx of active bleeding at this time  -Transfuse as needed    s/p 1 unit pRBC with appropriate increase in H/H
Hgb has been trending down from ~10 in Feb to 8 in September  - Suspect anemia of CKD  - iron studies, b12, folate, FOBT, T&S performed   - No signs/sx of active bleeding at this time  -Transfuse as needed    s/p 1 unit pRBC with appropriate increase in H/H
Hgb has been trending down from ~10 in Feb to 8 in September  - Suspect anemia of CKD  - iron studies, b12, folate, FOBT, T&S performed   - No signs/sx of active bleeding at this time  -Transfuse as needed    s/p 1 unit pRBC

## 2023-10-05 NOTE — PROGRESS NOTE ADULT - SUBJECTIVE AND OBJECTIVE BOX
Patient is a 43y old  Male who presents with a chief complaint of Acute hypoxic respiratory failure (03 Oct 2023 11:44)  Patient seen in follow up for CKD 5.        PAST MEDICAL HISTORY:  Type 2 diabetes mellitus with complication, without long-term current use of insulin    Insulin dependent type 2 diabetes mellitus    Stage 4 chronic kidney disease    HTN (hypertension)    HLD (hyperlipidemia)    History of diabetic retinopathy      MEDICATIONS  (STANDING):  calcium acetate 1334 milliGRAM(s) Oral three times a day with meals  chlorhexidine 2% Cloths 1 Application(s) Topical <User Schedule>  dextrose 5%. 1000 milliLiter(s) (100 mL/Hr) IV Continuous <Continuous>  dextrose 5%. 1000 milliLiter(s) (50 mL/Hr) IV Continuous <Continuous>  dextrose 50% Injectable 25 Gram(s) IV Push once  dextrose 50% Injectable 25 Gram(s) IV Push once  dextrose 50% Injectable 12.5 Gram(s) IV Push once  epoetin zulay (PROCRIT) Injectable 80968 Unit(s) SubCutaneous <User Schedule>  glucagon  Injectable 1 milliGRAM(s) IntraMuscular once  influenza   Vaccine 0.5 milliLiter(s) IntraMuscular once  insulin glargine Injectable (LANTUS) 7 Unit(s) SubCutaneous at bedtime  insulin lispro (ADMELOG) corrective regimen sliding scale   SubCutaneous every 6 hours  labetalol 200 milliGRAM(s) Oral every 8 hours  sodium bicarbonate 650 milliGRAM(s) Oral two times a day  torsemide 80 milliGRAM(s) Oral two times a day    MEDICATIONS  (PRN):  AQUAPHOR (petrolatum Ointment) 1 Application(s) Topical three times a day PRN dry nostrils  dextrose Oral Gel 15 Gram(s) Oral once PRN Blood Glucose LESS THAN 70 milliGRAM(s)/deciliter    T(C): 36.3 (10-05-23 @ 11:36), Max: 36.9 (10-03-23 @ 16:41)  HR: 85 (10-05-23 @ 11:00) (75 - 88)  BP: 167/87 (10-05-23 @ 11:00) (130/76 - 173/92)  RR: 18 (10-05-23 @ 11:00)  SpO2: 98% (10-05-23 @ 11:00)  Wt(kg): --  I&O's Detail    04 Oct 2023 07:01  -  05 Oct 2023 07:00  --------------------------------------------------------  IN:    Furosemide: 2.5 mL    Oral Fluid: 420 mL  Total IN: 422.5 mL    OUT:    Indwelling Catheter - Urethral (mL): 1290 mL    Voided (mL): 2175 mL  Total OUT: 3465 mL    Total NET: -3042.5 mL                    PHYSICAL EXAM:  General: No distress  Respiratory: b/l air entry  Cardiovascular: S1 S2  Gastrointestinal: soft  Extremities: edema, improved                              LABORATORY:                        8.4    11.15 )-----------( 301      ( 05 Oct 2023 05:05 )             26.6     10-05    135  |  95<L>  |  116<H>  ----------------------------<  91  4.1   |  26  |  6.30<H>    Ca    8.4<L>      05 Oct 2023 05:05  Phos  8.4     10-05  Mg     2.2     10-05    TPro  6.4  /  Alb  2.4<L>  /  TBili  0.4  /  DBili  x   /  AST  40<H>  /  ALT  170<H>  /  AlkPhos  59  10-04    Sodium: 135 mmol/L (10-05 @ 05:05)  Sodium: 137 mmol/L (10-04 @ 05:42)  Sodium: 135 mmol/L (10-03 @ 14:21)    Potassium: 4.1 mmol/L (10-05 @ 05:05)  Potassium: 4.1 mmol/L (10-04 @ 05:42)  Potassium: 4.4 mmol/L (10-03 @ 14:21)    Hemoglobin: 8.4 g/dL (10-05 @ 05:05)  Hemoglobin: 8.1 g/dL (10-04 @ 05:42)  Hemoglobin: 6.8 g/dL (10-03 @ 05:41)    Creatinine, Serum 6.30 (10-05 @ 05:05)  Creatinine, Serum 6.50 (10-04 @ 05:42)  Creatinine, Serum 6.40 (10-03 @ 14:21)  Creatinine, Serum 6.40 (10-03 @ 05:41)        LIVER FUNCTIONS - ( 04 Oct 2023 05:42 )  Alb: 2.4 g/dL / Pro: 6.4 g/dL / ALK PHOS: 59 U/L / ALT: 170 U/L / AST: 40 U/L / GGT: x           Urinalysis Basic - ( 05 Oct 2023 05:05 )    Color: x / Appearance: x / SG: x / pH: x  Gluc: 91 mg/dL / Ketone: x  / Bili: x / Urobili: x   Blood: x / Protein: x / Nitrite: x   Leuk Esterase: x / RBC: x / WBC x   Sq Epi: x / Non Sq Epi: x / Bacteria: x           Patient is a 43y old  Male who presents with a chief complaint of Acute hypoxic respiratory failure (03 Oct 2023 11:44)  Patient seen in follow up for CKD 5.        PAST MEDICAL HISTORY:  Type 2 diabetes mellitus with complication, without long-term current use of insulin    Insulin dependent type 2 diabetes mellitus    Stage 4 chronic kidney disease    HTN (hypertension)    HLD (hyperlipidemia)    History of diabetic retinopathy      MEDICATIONS  (STANDING):  calcium acetate 1334 milliGRAM(s) Oral three times a day with meals  chlorhexidine 2% Cloths 1 Application(s) Topical <User Schedule>  dextrose 5%. 1000 milliLiter(s) (50 mL/Hr) IV Continuous <Continuous>  dextrose 5%. 1000 milliLiter(s) (100 mL/Hr) IV Continuous <Continuous>  dextrose 50% Injectable 12.5 Gram(s) IV Push once  dextrose 50% Injectable 25 Gram(s) IV Push once  dextrose 50% Injectable 25 Gram(s) IV Push once  epoetin zulay (PROCRIT) Injectable 05395 Unit(s) SubCutaneous <User Schedule>  glucagon  Injectable 1 milliGRAM(s) IntraMuscular once  influenza   Vaccine 0.5 milliLiter(s) IntraMuscular once  insulin glargine Injectable (LANTUS) 7 Unit(s) SubCutaneous at bedtime  insulin lispro (ADMELOG) corrective regimen sliding scale   SubCutaneous every 6 hours  labetalol 200 milliGRAM(s) Oral every 8 hours  sodium bicarbonate 650 milliGRAM(s) Oral two times a day  torsemide 80 milliGRAM(s) Oral two times a day    MEDICATIONS  (PRN):  AQUAPHOR (petrolatum Ointment) 1 Application(s) Topical three times a day PRN dry nostrils  dextrose Oral Gel 15 Gram(s) Oral once PRN Blood Glucose LESS THAN 70 milliGRAM(s)/deciliter    T(C): 36.3 (10-05-23 @ 11:36), Max: 36.9 (10-03-23 @ 16:41)  HR: 85 (10-05-23 @ 11:00) (75 - 88)  BP: 167/87 (10-05-23 @ 11:00) (130/76 - 173/92)  RR: 18 (10-05-23 @ 11:00)  SpO2: 98% (10-05-23 @ 11:00)  Wt(kg): --  I&O's Detail    04 Oct 2023 07:01  -  05 Oct 2023 07:00  --------------------------------------------------------  IN:    Furosemide: 2.5 mL    Oral Fluid: 420 mL  Total IN: 422.5 mL    OUT:    Indwelling Catheter - Urethral (mL): 1290 mL    Voided (mL): 2175 mL  Total OUT: 3465 mL    Total NET: -3042.5 mL                        PHYSICAL EXAM:  General: No distress  Respiratory: b/l air entry  Cardiovascular: S1 S2  Gastrointestinal: soft  Extremities: edema, improved                     LABORATORY:                        8.4    11.15 )-----------( 301      ( 05 Oct 2023 05:05 )             26.6     10-05    135  |  95<L>  |  116<H>  ----------------------------<  91  4.1   |  26  |  6.30<H>    Ca    8.4<L>      05 Oct 2023 05:05  Phos  8.4     10-05  Mg     2.2     10-05    TPro  6.4  /  Alb  2.4<L>  /  TBili  0.4  /  DBili  x   /  AST  40<H>  /  ALT  170<H>  /  AlkPhos  59  10-04    Sodium: 135 mmol/L (10-05 @ 05:05)  Sodium: 137 mmol/L (10-04 @ 05:42)  Sodium: 135 mmol/L (10-03 @ 14:21)    Potassium: 4.1 mmol/L (10-05 @ 05:05)  Potassium: 4.1 mmol/L (10-04 @ 05:42)  Potassium: 4.4 mmol/L (10-03 @ 14:21)    Hemoglobin: 8.4 g/dL (10-05 @ 05:05)  Hemoglobin: 8.1 g/dL (10-04 @ 05:42)  Hemoglobin: 6.8 g/dL (10-03 @ 05:41)    Creatinine, Serum 6.30 (10-05 @ 05:05)  Creatinine, Serum 6.50 (10-04 @ 05:42)  Creatinine, Serum 6.40 (10-03 @ 14:21)  Creatinine, Serum 6.40 (10-03 @ 05:41)        LIVER FUNCTIONS - ( 04 Oct 2023 05:42 )  Alb: 2.4 g/dL / Pro: 6.4 g/dL / ALK PHOS: 59 U/L / ALT: 170 U/L / AST: 40 U/L / GGT: x           Urinalysis Basic - ( 05 Oct 2023 05:05 )    Color: x / Appearance: x / SG: x / pH: x  Gluc: 91 mg/dL / Ketone: x  / Bili: x / Urobili: x   Blood: x / Protein: x / Nitrite: x   Leuk Esterase: x / RBC: x / WBC x   Sq Epi: x / Non Sq Epi: x / Bacteria: x

## 2023-10-05 NOTE — PROGRESS NOTE ADULT - ASSESSMENT
Pt is a 42 y/o M pmhx of DM2 on insulin w/ diabetic retinopathy/neuropahy, CKD stage IV on transplant list, HTN, HLD, HFpEF on torsemide, transferred from [facility] for possible HD, with SOB requiring BiPAP. Admitted to ICU for acute hypoxic respiratory failure 2/2 pulmonary edema, and renal failure with GABI on CKD.     Plan:   Neuro: Awake and alert at baseline      CV: HFpEF, suspected volume overload, improving  - TTE: LVEF 54%, LA severely dilated, moderate MR with multiple MR jets, mild-moderate TR, Dilated IVC  - decrease lasix gtt to 5 mL/hr  - continue labetalol 200 mg PO q8 hr for HTN      Pulm: Acute hypoxic respiratory failure secondary to pulmonary edema, resolved  - tolerating RA  - Positive for enterorhinovirus      GI: Diet: continue renal diet  - LFTs downtrending       Renal: GABI on CKD likely 2/2 diabetic nephropathy  - Pt has had profuse urine output, 5 L x 24 hours. decrease lasix gtt to 5 mL/hr  - Nephro Dr. Coulter consulted, will discuss plan for HD with pt's outpatient nephrologist  - electrolytes stable      ID: - BCx NGTD  - U/A neg, UCx NGTD  - positive for Enterorhinovirus  - Sputum Cx: no acid fast bacilli  - Quantiferon: indeterminate  - hepatitis panel negative, + for Hep B surface antibody      Endo: Glucose within goal range  - Hgb A1C 6.0, pt takes insulin at home  - continue Lantus 7 u qhs, Admelog 6 u premeal TID, and ISS      Heme: - anemia of chronic disease  - 10/3: Hgb 6.8, pt given 1 unit pRBC  - Hgb increased appropriately  - no active bleeding  - FU iron studies  - continue Heparin for DVT PPX   - D dimer elevated  - LE doppler negative for DVT Pt is a 44 y/o M pmhx of DM2 on insulin w/ diabetic retinopathy/neuropahy, CKD stage IV on transplant list, HTN, HLD, HFpEF on torsemide, transferred from [place] [facility] for possible HD, with SOB requiring BiPAP. Admitted to ICU for acute hypoxic respiratory failure 2/2 pulmonary edema, and renal failure with GABI on CKD.     Plan:   Neuro: Awake and alert at baseline      CV: HFpEF, suspected volume overload, resolved  - TTE: LVEF 54%, LA severely dilated, moderate MR with multiple MR jets, mild-moderate TR, Dilated IVC  - Continue Torsemide 80 mg PO BID  - continue labetalol 200 mg PO q8 hr for HTN      Pulm: Acute hypoxic respiratory failure secondary to pulmonary edema, resolved  - tolerating RA  - Positive for enterorhinovirus      GI: Diet: continue renal diet      Renal: GABI on CKD likely 2/2 diabetic nephropathy, stable  - started on torsemide 80 mg PO BID  - Cr stable at 6.3 today  - Nephro Dr. Coulter consulted, pt stable for dc home, with close FU with his nephrologist       ID: - BCx NGTD  - U/A neg, UCx NGTD  - positive for Enterorhinovirus  - Sputum Cx: no acid fast bacilli, FU Cx  - Quantiferon: indeterminate  - hepatitis panel negative, + for Hep B surface antibody      Endo: Glucose within goal range  - Hgb A1C 6.0, pt takes insulin at home  - continue Lantus 7 u qhs, Admelog 6 u premeal TID, and ISS      Heme: - anemia of chronic disease  - 10/3: Hgb 6.8, pt given 1 unit pRBC  - Hgb increased appropriately  - no active bleeding  - FU iron studies  - continue Heparin for DVT PPX   - D dimer elevated  - LE doppler negative for DVT Pt is a 44 y/o M pmhx of DM2 on insulin w/ diabetic retinopathy/neuropahy, CKD stage IV on transplant list, HTN, HLD, HFpEF on torsemide, transferred from [facility] ED for possible HD, with SOB requiring BiPAP. Admitted to ICU for acute hypoxic respiratory failure 2/2 pulmonary edema, and renal failure with GABI on CKD. Stable for discharge home with close FU with nephrologist and PCP.    Plan:   Neuro: Awake and alert at baseline      CV: HFpEF, suspected volume overload, resolved  - TTE: LVEF 54%, LA severely dilated, moderate MR with multiple MR jets, mild-moderate TR, Dilated IVC  - Continue Torsemide 80 mg PO BID  - continue labetalol 200 mg PO q8 hr for HTN      Pulm: Acute hypoxic respiratory failure secondary to pulmonary edema, resolved  - tolerating RA  - Positive for enterorhinovirus      GI: Diet: continue renal diet      Renal: GABI on CKD likely 2/2 diabetic nephropathy, stable  - started on torsemide 80 mg PO BID  - Cr stable at 6.3 today, no need for HD at this time  - Nephro Dr. Coulter consulted, pt stable for dc home today, with close FU with his nephrologist within 1 week      ID: - BCx NGTD  - U/A neg, UCx NGTD  - positive for Enterorhinovirus  - Sputum Cx: no acid fast bacilli, FU Cx  - Quantiferon: indeterminate  - Pt informed to follow up with his PCP for further workup of indeterminate Quantiferon test, including repeat Quantiferon and PPD      Endo: Glucose within goal range  - Hgb A1C 6.0, pt takes insulin at home  - continue Lantus 7 u qhs, Admelog 6 u premeal TID, and ISS      Heme: - anemia of chronic disease  - 10/3: Hgb 6.8, pt given 1 unit pRBC  - Hgb increased appropriately  - no active bleeding  - continue Heparin for DVT PPX   - D dimer elevated  - LE doppler negative for DVT

## 2023-10-05 NOTE — PROGRESS NOTE ADULT - PROBLEM SELECTOR PLAN 8
Chronic  Continue home coreg 3.125mg BID + amlodipine 10mg  Started on Labetalol   monitor vitals
Chronic, hypertensive on admission  Continue home coreg 3.125mg BID + amlodipine 10mg/d
Chronic  Continue home coreg 3.125mg BID + amlodipine 10mg  Started on Labetalol   monitor vitals
Chronic, hypertensive on admission  Continue home coreg 3.125mg BID + amlodipine 10mg  Started on Labetalol

## 2023-10-05 NOTE — DISCHARGE NOTE NURSING/CASE MANAGEMENT/SOCIAL WORK - NSDCPEFALRISK_GEN_ALL_CORE
For information on Fall & Injury Prevention, visit: [email or web address] OR  [email or web address] OR  https://www.cdc.gov/steadi/patient.html

## 2023-10-05 NOTE — PROGRESS NOTE ADULT - PROBLEM SELECTOR PLAN 7
Chronic, uncontrolled  - Hold home januvia  - Continue basal insulin  - Continue ISS while inpatient   - Management per ICU
Chronic, uncontrolled  - Hold home januvia  - Continue basal insulin, ISS  - Management per ICU
Chronic, a1c 5.8   - Hold home januvia  - Continue basal insulin, ISS  - Management per ICU
Chronic, a1c 5.8   - Hold home januvia  - Continue basal insulin, ISS  - Management per ICU

## 2023-10-05 NOTE — PROGRESS NOTE ADULT - PROBLEM SELECTOR PLAN 2
Secondary to renal failure/HFpEF  - Noted ECHO done for pre-transplant evaluation - normal EF  - Continue IV diuretics as above   - Daily weight's, Strict I's and O's  - Trop slightly elevated at 83 likely demand in setting of resp distress/renal failure  - Trend trops to peak and follow up ECHO  - Management per ICU

## 2023-10-05 NOTE — PROGRESS NOTE ADULT - PROBLEM SELECTOR PLAN 3
Met sepsis criteria on admission: HR, WBC, source: ?enterovirus   - He is clinically hypervolemic with anasarca - may need dialysis if not improving with diuretics  - will hold off on sepsis fluids given concern for volume overload in the setting of pulmonary edema 2/2 CHF vs CKD  - Followup AM CBC
Met sepsis criteria: HR, WBC, source: PNA, respiratory rate   - likely 2/2 AHRF due to PNA vs acute pulmonary edema vs PE  - He is clinically hypervolemic with anasarca - may need dialysis if not improving with diuretics  - will hold off on sepsis fluids given concern for volume overload in the setting of pulmonary edema 2/2 CHF vs CKD  - Followup AM CBC  - Nontoxic appearing, low suspicion for infection. Off Abx
Met sepsis criteria: HR, WBC, source: PNA, respiratory rate   - likely 2/2 AHRF due to PNA vs acute pulmonary edema vs PE  - He is clinically hypervolemic with anasarca - may need dialysis if not improving with diuretics  - will hold off on sepsis fluids given concern for volume overload in the setting of pulmonary edema 2/2 CHF vs CKD  - Followup AM CBC  - Nontoxic appearing, low suspicion for infection. Off Abx
Met sepsis criteria on admission: HR, WBC, source: ?enterovirus   - He is clinically hypervolemic with anasarca - may need dialysis if not improving with diuretics  - will hold off on sepsis fluids given concern for volume overload in the setting of pulmonary edema 2/2 CHF vs CKD  - Followup AM CBC

## 2023-10-05 NOTE — PROGRESS NOTE ADULT - REASON FOR ADMISSION
Acute hypoxic respiratory failure

## 2023-10-05 NOTE — PROGRESS NOTE ADULT - PROBLEM SELECTOR PROBLEM 7
Insulin dependent type 2 diabetes mellitus

## 2023-10-05 NOTE — DISCHARGE NOTE NURSING/CASE MANAGEMENT/SOCIAL WORK - PATIENT PORTAL LINK FT
You can access the FollowMyHealth Patient Portal offered by [facility] by registering at the following website: [email or web address]. By joining [facility]’s FollowMyHealth portal, you will also be able to view your health information using other applications (apps) compatible with our system.

## 2023-10-05 NOTE — PROGRESS NOTE ADULT - PROBLEM SELECTOR PLAN 9
Hold statin in setting of transaminitis and elevated CK

## 2023-10-05 NOTE — PROGRESS NOTE ADULT - PROBLEM SELECTOR PLAN 10
VTE prophylaxis: subcutaneous unfractionated heparin

## 2023-10-05 NOTE — PROGRESS NOTE ADULT - TIME BILLING
I spent 35 minutes providing medical care for this patient. This includes reviewing labs, consultant notes, vital signs, ins and outs, medication list, any imaging obtained, examining and assessing patient, discussing with patient and/or HCP or representative of patient. This does not include any procedure related time.
Reviewing chart notes and data, reviewing telemetry monitor records, face to face time counseling the patient, communicating with ICU Dr Haile, coordinating care with SW/CM
Reviewing chart notes and data, reviewing telemetry monitor records, face to face time counseling the patient
Reviewing chart notes and data, reviewing telemetry monitor records, face to face time counseling the patient, communicating with Dr. Coulter renal , coordinating care with SW/CM
Reviewing chart notes and data, reviewing telemetry monitor records, face to face time counseling the patient

## 2023-10-05 NOTE — PROGRESS NOTE ADULT - PROBLEM SELECTOR PROBLEM 6
Problem: Breathing Pattern Ineffective  Goal: Effective Breathing Pattern  Outcome: Ongoing, Progressing     
Anemia secondary to renal failure

## 2023-10-05 NOTE — PROGRESS NOTE ADULT - PROBLEM SELECTOR PLAN 5
Suspect congestive hepatopathy vs statin induced myopathy  - Hold statin in setting of transaminitis and elevated CK  - Continue diuretics and trend CMP  - avoid hepatotoxic medications
Suspect congestive hepatopathy  - Hold statin in setting of transaminitis and elevated CK  - Continue diuretics and trend CMP  - avoid hepatotoxic medications
Suspect congestive hepatopathy  - Hold statin in setting of transaminitis and elevated CK  - Continue diuretics and trend CMP  - avoid hepatotoxic medications
Suspect congestive hepatopathy vs statin induced myopathy  - Hold statin in setting of transaminitis and elevated CK  - Continue diuretics and trend CMP  - avoid hepatotoxic medications

## 2023-10-05 NOTE — PROGRESS NOTE ADULT - PROBLEM SELECTOR PLAN 1
Hypoxic on presentation requiring NIPPV with BiLevel PAP   - AHRF likely 2/2 acute pulmonary edema from diastolic CHF/ GABI on CKD   - Continue Lasix gtt  - Marylin renal consulted, recommendations appreciated. Possible need for HD in the near future  - Stop abx. Low concern for infectious etiology   - positive enterovirus  - Followup quantiferon   - ID and Cardiology consulted recommendations appreciated
Hypoxic on presentation requiring NIPPV with BiLevel PAP   - AHRF likely 2/2 acute pulmonary edema from diastolic CHF/ GABI on CKD   - Off Lasix gtt - continue Torsemide 80mg BID  - Marylin renal consulted, recommendations appreciated. Possible need for HD in the near future. Permacath planned  - Stop abx. Low concern for infectious etiology   - positive enterovirus  - Followup quantiferon   - ID and Cardiology consulted recommendations appreciated
Hypoxic on presentation requiring NIPPV with BiLevel PAP   - CXR: b/l patchy opacities R>L; multifocal pneumonia vs asymmetric pulmonary edema  - elevated proBNP 21k; covid negative  - AHRF likely 2/2 PNA vs acute pulmonary edema from CHF/GABI on CKD vs possible PE  - Continue Lasix gtt  - Marylin renal consulted, recommendations appreciated  - Stop abx. Low concern for infectious etiology   - positive enterovirus  - Followup quantiferon   - Check 2D ECHO, LE dopplers to r/o DVT given recent long flight  - ID and Cardiology consulted recommendations appreciated
Hypoxic on presentation requiring NIPPV with BiLevel PAP   - CXR: b/l patchy opacities R>L; multifocal pneumonia vs asymmetric pulmonary edema  - AHRF likely 2/2 PNA vs acute pulmonary edema from CHF/GABI on CKD vs possible PE  - Continue Lasix gtt  - Marylin renal consulted, recommendations appreciated  - Stop abx. Low concern for infectious etiology   - positive enterovirus  - Followup quantiferon   - ID and Cardiology consulted recommendations appreciated

## 2023-10-05 NOTE — PROGRESS NOTE ADULT - PROVIDER SPECIALTY LIST ADULT
Nephrology
Critical Care
Nephrology
Critical Care
Nephrology
Hospitalist

## 2023-10-05 NOTE — PROGRESS NOTE ADULT - SUBJECTIVE AND OBJECTIVE BOX
Patient is a 43y old  Male who presents with a chief complaint of Acute hypoxic respiratory failure (05 Oct 2023 07:51)      INTERVAL HPI/OVERNIGHT EVENTS: Patient seen and examined at bedside.   Off Lasix gtt - started on Torsemide 80 mg PO BID.   Davis catheter removed.   Monitor urine output - 3465 x 24 hours.   Patient NPO after midnight pending permacath placement.       MEDICATIONS  (STANDING):  calcium acetate 1334 milliGRAM(s) Oral three times a day with meals  chlorhexidine 2% Cloths 1 Application(s) Topical <User Schedule>  dextrose 5%. 1000 milliLiter(s) (50 mL/Hr) IV Continuous <Continuous>  dextrose 5%. 1000 milliLiter(s) (100 mL/Hr) IV Continuous <Continuous>  dextrose 50% Injectable 12.5 Gram(s) IV Push once  dextrose 50% Injectable 25 Gram(s) IV Push once  dextrose 50% Injectable 25 Gram(s) IV Push once  epoetin zulay (PROCRIT) Injectable 36557 Unit(s) SubCutaneous <User Schedule>  glucagon  Injectable 1 milliGRAM(s) IntraMuscular once  influenza   Vaccine 0.5 milliLiter(s) IntraMuscular once  insulin glargine Injectable (LANTUS) 7 Unit(s) SubCutaneous at bedtime  insulin lispro (ADMELOG) corrective regimen sliding scale   SubCutaneous every 6 hours  labetalol 200 milliGRAM(s) Oral every 8 hours  sodium bicarbonate 650 milliGRAM(s) Oral two times a day  torsemide 80 milliGRAM(s) Oral two times a day    MEDICATIONS  (PRN):  AQUAPHOR (petrolatum Ointment) 1 Application(s) Topical three times a day PRN dry nostrils  dextrose Oral Gel 15 Gram(s) Oral once PRN Blood Glucose LESS THAN 70 milliGRAM(s)/deciliter      Allergies    No Known Allergies    Intolerances    Vital Signs Last 24 Hrs  T(C): 36.2 (05 Oct 2023 08:22), Max: 36.8 (04 Oct 2023 20:31)  T(F): 97.2 (05 Oct 2023 08:22), Max: 98.2 (04 Oct 2023 20:31)  HR: 77 (05 Oct 2023 10:00) (75 - 87)  BP: 153/81 (05 Oct 2023 10:00) (141/78 - 173/92)  BP(mean): 108 (05 Oct 2023 10:00) (95 - 123)  RR: 15 (05 Oct 2023 10:00) (10 - 30)  SpO2: 97% (05 Oct 2023 10:00) (94% - 97%)    Parameters below as of 04 Oct 2023 20:00  Patient On (Oxygen Delivery Method): room air      I&O's Summary    04 Oct 2023 07:01  -  05 Oct 2023 07:00  --------------------------------------------------------  IN: 422.5 mL / OUT: 3465 mL / NET: -3042.5 mL      BMI (kg/m2): 35.9 (10-01-23 @ 22:12), 44.9 (10-01-23 @ 18:23)    PHYSICAL EXAM:  GENERAL: NAD  HEENT:  AT/NC, anicteric, moist mucous membranes, EOMI, PERRL, conjunctiva and sclera clear  CHEST/LUNG: diminished breath sounds at bases overall cta b/l, poor inspiratory effort  HEART:  RRR, S1, S2, no murmurs; trace pitting edema  ABDOMEN:  BS+, soft, nontender, nondistended, obese  MSK/EXTREMITIES: palpable peripheral pulses, no clubbing or cyanosis  NERVOUS SYSTEM: A&Ox3 grossly moves all extremities   PSYCH: Appropriate affect, Alert & Awake; fair judgement      LABS: Personally reviewed  CBC                        8.4    11.15 )-----------( 301      ( 05 Oct 2023 05:05 )             26.6     CMP  10-05    135  |  95  |  116  ----------------------------<  91  4.1   |  26  |  6.30    Ca    8.4      05 Oct 2023 05:05  Phos  8.4     10-05  Mg     2.2     10-05    TPro  6.4  /  Alb  2.4  /  TBili  0.4  /  DBili  x   /  AST  40  /  ALT  170  /  AlkPhos  59  10-04          PT/INR - ( 05 Oct 2023 05:05 )   PT: 12.4 sec;   INR: 1.06 ratio         PTT - ( 05 Oct 2023 05:05 )  PTT:30.5 sec                        POCT Blood Glucose.: 110 mg/dL (05 Oct 2023 05:17)  POCT Blood Glucose.: 110 mg/dL (04 Oct 2023 23:50)  POCT Blood Glucose.: 150 mg/dL (04 Oct 2023 17:41)  POCT Blood Glucose.: 175 mg/dL (04 Oct 2023 12:07)      Urinalysis Basic - ( 05 Oct 2023 05:05 )    Color: x / Appearance: x / SG: x / pH: x  Gluc: 91 mg/dL / Ketone: x  / Bili: x / Urobili: x   Blood: x / Protein: x / Nitrite: x   Leuk Esterase: x / RBC: x / WBC x   Sq Epi: x / Non Sq Epi: x / Bacteria: x        Culture - Acid Fast - Sputum w/Smear (collected 02 Oct 2023 13:00)  Source: .Sputum Sputum  Preliminary Report (04 Oct 2023 15:06):    Culture is being performed.    Culture - Urine (collected 01 Oct 2023 22:45)  Source: Catheterized Catheterized  Final Report (03 Oct 2023 06:49):    No growth    Culture - Blood (collected 01 Oct 2023 19:15)  Source: .Blood Blood  Preliminary Report (04 Oct 2023 14:01):    No growth at 48 Hours    Culture - Blood (collected 01 Oct 2023 19:15)  Source: .Blood Blood  Preliminary Report (04 Oct 2023 14:01):    No growth at 48 Hours            Culture - Acid Fast - Sputum w/Smear (collected 10-02-23 @ 13:00)  Source: .Sputum Sputum  Preliminary Report (10-04-23 @ 15:06):    Culture is being performed.    Culture - Urine (collected 10-01-23 @ 22:45)  Source: Catheterized Catheterized  Final Report (10-03-23 @ 06:49):    No growth    Culture - Blood (collected 10-01-23 @ 19:15)  Source: .Blood Blood  Preliminary Report (10-04-23 @ 14:01):    No growth at 48 Hours    Culture - Blood (collected 10-01-23 @ 19:15)  Source: .Blood Blood  Preliminary Report (10-04-23 @ 14:01):    No growth at 48 Hours        RADIOLOGY & ADDITIONAL TESTS: Personally reviewed.     Consultant(s) Notes Reviewed:  [x] YES  [ ] NO   Discussed with SW/TINA, RN

## 2023-10-05 NOTE — PROGRESS NOTE ADULT - PROBLEM SELECTOR PLAN 4
BLCr ~4, has been progressively worsening with Cr 5.7 in 9/2023  - Continue IV diuretics per renal and ICU  - Assess for possible hemodialysis   - Management per ICU  -check BMP, Mag, phos, ionized Ca
BLCr ~4, has been progressively worsening with Cr 5.7 in 9/2023  - Continue IV diuretics per renal and ICU  - Assess for possible hemodialysis - no urgent dialytic indication at this time  - Management per ICU  FU 1 pm BMP, Mag, phos, ionized Ca
BLCr ~4, has been progressively worsening with Cr 5.7 in 9/2023  - Continue IV diuretics per renal and ICU  - Assess for possible hemodialysis - no urgent dialytic indication at this time  - Management per ICU  FU 1 pm BMP, Mag, phos, ionized Ca
BLCr ~4, has been progressively worsening with Cr 5.7 in 9/2023  - Continue IV diuretics per renal and ICU  - Assess for possible hemodialysis   - Management per ICU  -check BMP, Mag, phos, ionized Ca

## 2023-10-05 NOTE — PROGRESS NOTE ADULT - SUBJECTIVE AND OBJECTIVE BOX
INTERVAL HPI/OVERNIGHT EVENTS:  No events overnight. Last dose heparin 10 pm last night. hold further AC pending need for permacath if HD is needed. Pt NPO after midnight.     SUBJECTIVE: Patient seen and examined at bedside.     ROS:  CV: Denies chest pain  Resp: Denies SOB  GI: Denies abdominal pain, constipation, diarrhea, nausea, vomiting  : Denies dysuria  ID: Denies fevers, chills  MSK: Denies joint pain     OBJECTIVE:    VITAL SIGNS:  ICU Vital Signs Last 24 Hrs  T(C): 36.4 (05 Oct 2023 04:55), Max: 36.8 (04 Oct 2023 20:31)  T(F): 97.6 (05 Oct 2023 04:55), Max: 98.2 (04 Oct 2023 20:31)  HR: 79 (05 Oct 2023 06:00) (77 - 87)  BP: 155/82 (05 Oct 2023 06:00) (141/78 - 173/92)  BP(mean): 111 (05 Oct 2023 06:00) (95 - 123)  ABP: --  ABP(mean): --  RR: 17 (05 Oct 2023 06:00) (10 - 30)  SpO2: 97% (05 Oct 2023 06:00) (94% - 97%)    O2 Parameters below as of 04 Oct 2023 20:00  Patient On (Oxygen Delivery Method): room air              10-04 @ 07:01  -  10-05 @ 07:00  --------------------------------------------------------  IN: 422.5 mL / OUT: 3465 mL / NET: -3042.5 mL      CAPILLARY BLOOD GLUCOSE      POCT Blood Glucose.: 110 mg/dL (05 Oct 2023 05:17)      PHYSICAL EXAM:  General: NAD, comfortable  HEENT: NCAT, clear conjunctiva, no scleral icterus  Respiratory: CTA b/l, no wheezing, rhonchi, rales  Cardiovascular: RRR, normal S1S2, no M/R/G  Abdomen: soft, NT/ND, bowel sounds present  Extremities: no LE edema or calf tenderness  Neurology: awake and alert    MEDICATIONS:  MEDICATIONS  (STANDING):  calcium acetate 1334 milliGRAM(s) Oral three times a day with meals  chlorhexidine 2% Cloths 1 Application(s) Topical <User Schedule>  dextrose 5%. 1000 milliLiter(s) (50 mL/Hr) IV Continuous <Continuous>  dextrose 5%. 1000 milliLiter(s) (100 mL/Hr) IV Continuous <Continuous>  dextrose 50% Injectable 12.5 Gram(s) IV Push once  dextrose 50% Injectable 25 Gram(s) IV Push once  dextrose 50% Injectable 25 Gram(s) IV Push once  epoetin zulay (PROCRIT) Injectable 38593 Unit(s) SubCutaneous <User Schedule>  glucagon  Injectable 1 milliGRAM(s) IntraMuscular once  influenza   Vaccine 0.5 milliLiter(s) IntraMuscular once  insulin glargine Injectable (LANTUS) 7 Unit(s) SubCutaneous at bedtime  insulin lispro (ADMELOG) corrective regimen sliding scale   SubCutaneous every 6 hours  labetalol 200 milliGRAM(s) Oral every 8 hours  sodium bicarbonate 650 milliGRAM(s) Oral two times a day  torsemide 80 milliGRAM(s) Oral two times a day    MEDICATIONS  (PRN):  AQUAPHOR (petrolatum Ointment) 1 Application(s) Topical three times a day PRN dry nostrils  dextrose Oral Gel 15 Gram(s) Oral once PRN Blood Glucose LESS THAN 70 milliGRAM(s)/deciliter      ALLERGIES:  Allergies    No Known Allergies    Intolerances        LABS:                        8.4    11.15 )-----------( 301      ( 05 Oct 2023 05:05 )             26.6     10-04    137  |  99  |  116<H>  ----------------------------<  127<H>  4.1   |  25  |  6.50<H>    Ca    8.2<L>      04 Oct 2023 05:42  Phos  8.9     10-04  Mg     2.1     10-04    TPro  6.4  /  Alb  2.4<L>  /  TBili  0.4  /  DBili  x   /  AST  40<H>  /  ALT  170<H>  /  AlkPhos  59  10-04    PT/INR - ( 05 Oct 2023 05:05 )   PT: 12.4 sec;   INR: 1.06 ratio         PTT - ( 05 Oct 2023 05:05 )  PTT:30.5 sec  Urinalysis Basic - ( 04 Oct 2023 05:42 )    Color: x / Appearance: x / SG: x / pH: x  Gluc: 127 mg/dL / Ketone: x  / Bili: x / Urobili: x   Blood: x / Protein: x / Nitrite: x   Leuk Esterase: x / RBC: x / WBC x   Sq Epi: x / Non Sq Epi: x / Bacteria: x        RADIOLOGY & ADDITIONAL TESTS:       CENTRAL LINE: N        DATE INSERTED:             REMOVE: N  DOLAN: Y                       DATE INSERTED:              REMOVE: Y/N  A-LINE: N                       DATE INSERTED:              REMOVE: Y/N      GLOBAL ISSUE/BEST PRACTICE  Analgesia: Y  Sedation: Y  HOB elevation: yes  Stress ulcer prophylaxis: Y  VTE prophylaxis: Y  Glycemic control: Y  Nutrition: Y    CODE STATUS: Full Code INTERVAL HPI/OVERNIGHT EVENTS:  No events overnight. Last dose heparin 10 pm last night. hold further AC pending need for permacath if HD is needed. Pt NPO after midnight.     SUBJECTIVE: Patient seen and examined at bedside. He feels well, continues to have productive cough. denies SOB, CP, HA, abd pain, N.    ROS:  CV: Denies chest pain  Resp: + cough. Denies SOB  GI: Denies abdominal pain, constipation, diarrhea, nausea, vomiting  ID: Denies fevers, chills  MSK: Denies joint pain     OBJECTIVE:    VITAL SIGNS:  ICU Vital Signs Last 24 Hrs  T(C): 36.4 (05 Oct 2023 04:55), Max: 36.8 (04 Oct 2023 20:31)  T(F): 97.6 (05 Oct 2023 04:55), Max: 98.2 (04 Oct 2023 20:31)  HR: 79 (05 Oct 2023 06:00) (77 - 87)  BP: 155/82 (05 Oct 2023 06:00) (141/78 - 173/92)  BP(mean): 111 (05 Oct 2023 06:00) (95 - 123)  ABP: --  ABP(mean): --  RR: 17 (05 Oct 2023 06:00) (10 - 30)  SpO2: 97% (05 Oct 2023 06:00) (94% - 97%)    O2 Parameters below as of 04 Oct 2023 20:00  Patient On (Oxygen Delivery Method): room air              10-04 @ 07:01  -  10-05 @ 07:00  --------------------------------------------------------  IN: 422.5 mL / OUT: 3465 mL / NET: -3042.5 mL      CAPILLARY BLOOD GLUCOSE      POCT Blood Glucose.: 110 mg/dL (05 Oct 2023 05:17)      PHYSICAL EXAM:  General: NAD, comfortable  HEENT: NCAT, clear conjunctiva, no scleral icterus  Respiratory: CTA b/l, no wheezing, rhonchi, rales  Cardiovascular: RRR, normal S1S2, no M/R/G  Abdomen: soft, NT/ND, bowel sounds present  Extremities: no LE edema or calf tenderness  Neurology: awake and alert    MEDICATIONS:  MEDICATIONS  (STANDING):  calcium acetate 1334 milliGRAM(s) Oral three times a day with meals  chlorhexidine 2% Cloths 1 Application(s) Topical <User Schedule>  dextrose 5%. 1000 milliLiter(s) (50 mL/Hr) IV Continuous <Continuous>  dextrose 5%. 1000 milliLiter(s) (100 mL/Hr) IV Continuous <Continuous>  dextrose 50% Injectable 12.5 Gram(s) IV Push once  dextrose 50% Injectable 25 Gram(s) IV Push once  dextrose 50% Injectable 25 Gram(s) IV Push once  epoetin zulay (PROCRIT) Injectable 26024 Unit(s) SubCutaneous <User Schedule>  glucagon  Injectable 1 milliGRAM(s) IntraMuscular once  influenza   Vaccine 0.5 milliLiter(s) IntraMuscular once  insulin glargine Injectable (LANTUS) 7 Unit(s) SubCutaneous at bedtime  insulin lispro (ADMELOG) corrective regimen sliding scale   SubCutaneous every 6 hours  labetalol 200 milliGRAM(s) Oral every 8 hours  sodium bicarbonate 650 milliGRAM(s) Oral two times a day  torsemide 80 milliGRAM(s) Oral two times a day    MEDICATIONS  (PRN):  AQUAPHOR (petrolatum Ointment) 1 Application(s) Topical three times a day PRN dry nostrils  dextrose Oral Gel 15 Gram(s) Oral once PRN Blood Glucose LESS THAN 70 milliGRAM(s)/deciliter      ALLERGIES:  Allergies    No Known Allergies    Intolerances        LABS:                        8.4    11.15 )-----------( 301      ( 05 Oct 2023 05:05 )             26.6     10-04    137  |  99  |  116<H>  ----------------------------<  127<H>  4.1   |  25  |  6.50<H>    Ca    8.2<L>      04 Oct 2023 05:42  Phos  8.9     10-04  Mg     2.1     10-04    TPro  6.4  /  Alb  2.4<L>  /  TBili  0.4  /  DBili  x   /  AST  40<H>  /  ALT  170<H>  /  AlkPhos  59  10-04    PT/INR - ( 05 Oct 2023 05:05 )   PT: 12.4 sec;   INR: 1.06 ratio         PTT - ( 05 Oct 2023 05:05 )  PTT:30.5 sec  Urinalysis Basic - ( 04 Oct 2023 05:42 )    Color: x / Appearance: x / SG: x / pH: x  Gluc: 127 mg/dL / Ketone: x  / Bili: x / Urobili: x   Blood: x / Protein: x / Nitrite: x   Leuk Esterase: x / RBC: x / WBC x   Sq Epi: x / Non Sq Epi: x / Bacteria: x        RADIOLOGY & ADDITIONAL TESTS:       CENTRAL LINE: N        DATE INSERTED:             REMOVE: N  DOLAN: Y                       DATE INSERTED:              REMOVE: Y/N  A-LINE: N                       DATE INSERTED:              REMOVE: Y/N      GLOBAL ISSUE/BEST PRACTICE  Analgesia: Y  Sedation: Y  HOB elevation: yes  Stress ulcer prophylaxis: Y  VTE prophylaxis: Y  Glycemic control: Y  Nutrition: Y    CODE STATUS: Full Code INTERVAL HPI/OVERNIGHT EVENTS: Yesterday d/c'd lasix gtt and began torsemide 80 mg PO BID. Dolan catheter was removed. Pt had good urine output of 3465 x 24 hours.  Last dose heparin 10 pm last night, and hold further AC pending need for permacath if HD is needed. Pt NPO after midnight pending permacath placement.     SUBJECTIVE: Patient seen and examined at bedside. He feels well, continues to have productive cough. denies SOB, CP, HA, abd pain, N.    ROS:  CV: Denies chest pain  Resp: + cough. Denies SOB  GI: Denies abdominal pain, constipation, diarrhea, nausea, vomiting  ID: Denies fevers, chills  MSK: Denies joint pain     OBJECTIVE:    VITAL SIGNS:  ICU Vital Signs Last 24 Hrs  T(C): 36.4 (05 Oct 2023 04:55), Max: 36.8 (04 Oct 2023 20:31)  T(F): 97.6 (05 Oct 2023 04:55), Max: 98.2 (04 Oct 2023 20:31)  HR: 79 (05 Oct 2023 06:00) (77 - 87)  BP: 155/82 (05 Oct 2023 06:00) (141/78 - 173/92)  BP(mean): 111 (05 Oct 2023 06:00) (95 - 123)  ABP: --  ABP(mean): --  RR: 17 (05 Oct 2023 06:00) (10 - 30)  SpO2: 97% (05 Oct 2023 06:00) (94% - 97%)    O2 Parameters below as of 04 Oct 2023 20:00  Patient On (Oxygen Delivery Method): room air              10-04 @ 07:01  -  10-05 @ 07:00  --------------------------------------------------------  IN: 422.5 mL / OUT: 3465 mL / NET: -3042.5 mL      CAPILLARY BLOOD GLUCOSE      POCT Blood Glucose.: 110 mg/dL (05 Oct 2023 05:17)      PHYSICAL EXAM:  General: NAD, comfortable  HEENT: NCAT  Respiratory: CTA b/l, no wheezing, rhonchi, rales  Cardiovascular: RRR, normal S1S2, no M/R/G  Abdomen: soft, NT/ND, bowel sounds present  Extremities: Bilateral 1+ pitting LE edema. no calf tenderness  Neurology: awake and alert    MEDICATIONS:  MEDICATIONS  (STANDING):  calcium acetate 1334 milliGRAM(s) Oral three times a day with meals  chlorhexidine 2% Cloths 1 Application(s) Topical <User Schedule>  dextrose 5%. 1000 milliLiter(s) (50 mL/Hr) IV Continuous <Continuous>  dextrose 5%. 1000 milliLiter(s) (100 mL/Hr) IV Continuous <Continuous>  dextrose 50% Injectable 12.5 Gram(s) IV Push once  dextrose 50% Injectable 25 Gram(s) IV Push once  dextrose 50% Injectable 25 Gram(s) IV Push once  epoetin zulay (PROCRIT) Injectable 23249 Unit(s) SubCutaneous <User Schedule>  glucagon  Injectable 1 milliGRAM(s) IntraMuscular once  influenza   Vaccine 0.5 milliLiter(s) IntraMuscular once  insulin glargine Injectable (LANTUS) 7 Unit(s) SubCutaneous at bedtime  insulin lispro (ADMELOG) corrective regimen sliding scale   SubCutaneous every 6 hours  labetalol 200 milliGRAM(s) Oral every 8 hours  sodium bicarbonate 650 milliGRAM(s) Oral two times a day  torsemide 80 milliGRAM(s) Oral two times a day    MEDICATIONS  (PRN):  AQUAPHOR (petrolatum Ointment) 1 Application(s) Topical three times a day PRN dry nostrils  dextrose Oral Gel 15 Gram(s) Oral once PRN Blood Glucose LESS THAN 70 milliGRAM(s)/deciliter      ALLERGIES:  Allergies    No Known Allergies    Intolerances        LABS:                        8.4    11.15 )-----------( 301      ( 05 Oct 2023 05:05 )             26.6     10-04    137  |  99  |  116<H>  ----------------------------<  127<H>  4.1   |  25  |  6.50<H>    Ca    8.2<L>      04 Oct 2023 05:42  Phos  8.9     10-04  Mg     2.1     10-04    TPro  6.4  /  Alb  2.4<L>  /  TBili  0.4  /  DBili  x   /  AST  40<H>  /  ALT  170<H>  /  AlkPhos  59  10-04    PT/INR - ( 05 Oct 2023 05:05 )   PT: 12.4 sec;   INR: 1.06 ratio         PTT - ( 05 Oct 2023 05:05 )  PTT:30.5 sec  Urinalysis Basic - ( 04 Oct 2023 05:42 )    Color: x / Appearance: x / SG: x / pH: x  Gluc: 127 mg/dL / Ketone: x  / Bili: x / Urobili: x   Blood: x / Protein: x / Nitrite: x   Leuk Esterase: x / RBC: x / WBC x   Sq Epi: x / Non Sq Epi: x / Bacteria: x        RADIOLOGY & ADDITIONAL TESTS:       CENTRAL LINE: N          DOLAN: Y                         A-LINE: N                             GLOBAL ISSUE/BEST PRACTICE  Analgesia: N  Sedation: N  HOB elevation: yes  Stress ulcer prophylaxis: N  VTE prophylaxis: Y  Glycemic control: Y  Nutrition: Y    CODE STATUS: Full Code INTERVAL HPI/OVERNIGHT EVENTS: Yesterday d/c'd lasix gtt and began torsemide 80 mg PO BID. Dolan catheter was removed. Pt had good urine output of 3465 x 24 hours.  Last dose heparin 10 pm last night, and hold further AC pending need for permacath if HD is needed. Pt NPO after midnight pending permacath placement.     SUBJECTIVE: Patient seen and examined at bedside. He feels well, continues to have productive cough. denies SOB, CP, HA, abd pain, N.    ROS:  CV: Denies chest pain  Resp: + cough. Denies SOB  GI: Denies abdominal pain, constipation, diarrhea, nausea, vomiting  ID: Denies fevers, chills  MSK: Denies joint pain     OBJECTIVE:    VITAL SIGNS:  ICU Vital Signs Last 24 Hrs  T(C): 36.4 (05 Oct 2023 04:55), Max: 36.8 (04 Oct 2023 20:31)  T(F): 97.6 (05 Oct 2023 04:55), Max: 98.2 (04 Oct 2023 20:31)  HR: 79 (05 Oct 2023 06:00) (77 - 87)  BP: 155/82 (05 Oct 2023 06:00) (141/78 - 173/92)  BP(mean): 111 (05 Oct 2023 06:00) (95 - 123)  ABP: --  ABP(mean): --  RR: 17 (05 Oct 2023 06:00) (10 - 30)  SpO2: 97% (05 Oct 2023 06:00) (94% - 97%)    O2 Parameters below as of 04 Oct 2023 20:00  Patient On (Oxygen Delivery Method): room air              10-04 @ 07:01  -  10-05 @ 07:00  --------------------------------------------------------  IN: 422.5 mL / OUT: 3465 mL / NET: -3042.5 mL      CAPILLARY BLOOD GLUCOSE      POCT Blood Glucose.: 110 mg/dL (05 Oct 2023 05:17)      PHYSICAL EXAM:  General: NAD, comfortable  HEENT: NCAT  Respiratory: CTA b/l, no wheezing, rhonchi, rales  Cardiovascular: RRR, normal S1S2, no M/R/G  Abdomen: soft, NT/ND, bowel sounds present  Extremities: Bilateral 1+ pitting LE edema. no calf tenderness  Neurology: awake and alert    MEDICATIONS:  MEDICATIONS  (STANDING):  calcium acetate 1334 milliGRAM(s) Oral three times a day with meals  chlorhexidine 2% Cloths 1 Application(s) Topical <User Schedule>  dextrose 5%. 1000 milliLiter(s) (50 mL/Hr) IV Continuous <Continuous>  dextrose 5%. 1000 milliLiter(s) (100 mL/Hr) IV Continuous <Continuous>  dextrose 50% Injectable 12.5 Gram(s) IV Push once  dextrose 50% Injectable 25 Gram(s) IV Push once  dextrose 50% Injectable 25 Gram(s) IV Push once  epoetin zulay (PROCRIT) Injectable 56544 Unit(s) SubCutaneous <User Schedule>  glucagon  Injectable 1 milliGRAM(s) IntraMuscular once  influenza   Vaccine 0.5 milliLiter(s) IntraMuscular once  insulin glargine Injectable (LANTUS) 7 Unit(s) SubCutaneous at bedtime  insulin lispro (ADMELOG) corrective regimen sliding scale   SubCutaneous every 6 hours  labetalol 200 milliGRAM(s) Oral every 8 hours  sodium bicarbonate 650 milliGRAM(s) Oral two times a day  torsemide 80 milliGRAM(s) Oral two times a day    MEDICATIONS  (PRN):  AQUAPHOR (petrolatum Ointment) 1 Application(s) Topical three times a day PRN dry nostrils  dextrose Oral Gel 15 Gram(s) Oral once PRN Blood Glucose LESS THAN 70 milliGRAM(s)/deciliter      ALLERGIES:  Allergies    No Known Allergies    Intolerances        LABS:                        8.4    11.15 )-----------( 301      ( 05 Oct 2023 05:05 )             26.6     10-04    137  |  99  |  116<H>  ----------------------------<  127<H>  4.1   |  25  |  6.50<H>    Ca    8.2<L>      04 Oct 2023 05:42  Phos  8.9     10-04  Mg     2.1     10-04    TPro  6.4  /  Alb  2.4<L>  /  TBili  0.4  /  DBili  x   /  AST  40<H>  /  ALT  170<H>  /  AlkPhos  59  10-04    PT/INR - ( 05 Oct 2023 05:05 )   PT: 12.4 sec;   INR: 1.06 ratio         PTT - ( 05 Oct 2023 05:05 )  PTT:30.5 sec  Urinalysis Basic - ( 04 Oct 2023 05:42 )    Color: x / Appearance: x / SG: x / pH: x  Gluc: 127 mg/dL / Ketone: x  / Bili: x / Urobili: x   Blood: x / Protein: x / Nitrite: x   Leuk Esterase: x / RBC: x / WBC x   Sq Epi: x / Non Sq Epi: x / Bacteria: x        RADIOLOGY & ADDITIONAL TESTS:       CENTRAL LINE: N          DOLAN: N                         A-LINE: N                             GLOBAL ISSUE/BEST PRACTICE  Analgesia: N  Sedation: N  HOB elevation: yes  Stress ulcer prophylaxis: N  VTE prophylaxis: Y  Glycemic control: Y  Nutrition: Y    CODE STATUS: Full Code

## 2023-10-05 NOTE — PROGRESS NOTE ADULT - ASSESSMENT
CKD 5, Diabetic nephropathy  Dyspnea: Viral illness, ? fluid overload  Anemia  Diabetes  Hyperphosphatemia    10/02/23: Progression of CKD. On lasix drip. Monitor UO. No indication for urgent hemodialysis. Add procrit. Check iron studies.   Add calcium acetate when started on PO feeds. Monitor blood sugar levels. Insulin coverage as needed.   ICU management. Will follow electrolytes and renal function trend. Avoid nephrotoxic meds as possible. Avoid ACEI, ARB, NSAIDs and IV contrast.   10/03/23: Clinically better. Good diuresis. Rising creatinine trend with mandated diuresis. Will monitor for 24 hours. D/w pt regarding possible need for HD in the next 24-48 hours vs out pt follow up with pt's nephrologist Dr. Paniagua to initiate HD/PD as out pt. ICU management.   10/04/23: Stable renal indices. Good diuresis. Fluid status much better. PO fluid restriction. D/w pt and Dr. Paniagua today. Will plan for d/c if stable on PO diuretics if not will initiate HD tomorrow.   IR contacted for possible perma cath placement tomorrow. D/w ICU team.   10/05/23: Clinically stable. Tolerating PO diuretics. Ok for d/c from renal stand point with close out pt follow up with Dr. Paniagua. Pt aware that he will need RRT in the near future.   Pt will need repeat Quantiferon Gold as out pt. Pt advised on PO fluid restriction. D/w Dr. Paniagua over the phone, D/w ICU team.

## 2023-10-05 NOTE — PROGRESS NOTE ADULT - ATTENDING COMMENTS
Potassium is not on current or past medication list   
Mr. Zach Ramos is a 43 year old male pmhx of DM2 on insulin w/ diabetic retinopathy/neuropahy, CKD stage IV on transplant list, HTN, HLD, HFpEF on torsemide who presented to  ED for SOB. Was placed on BiPAP in ED w/ improvement, transferred to [facility] for possible HD.     Neuro:  -Not in any pain. Mental status at baseline.    CV:  -Volume overloaded state which has significantly improved. Patient put out 10L with diuresis.  -Labetalol 200mg q 8hrs with holding parameters for HTN.    Respiratory:  -Hypoxic respiratory failure 2/2 fluid overloaded state has resolved. Patient is on room air.   -Rhino-entero virus positive.  -Will monitor off antibiotics.    GI:  -Renal diet.  -LFTs mildly elevated, improving.     Renal:  -Worsening creatinine 2/2 diabetic nephropathy. Listed for transplant.  -Nephrology following.  -Continuing lasix drip, but decreased dosing given good urine output.  -Sodium bicarb PO.  -High phos- c/w phoslo.    -Patient had plans to start peritoneal dialysis. Awaiting recs from nephrology.  No urgent indication for HD today.    Endocrine:  -Hemoglobin a1c 5.9%.  -Increased lantus to home dose 7 units. Added pre-meals.  -ISS, FS qac.    Heme:  -Anemia of chronic disease.  -Transfuse <7. S/p 1 unit 10/3/23 with good response.  -elevated ddimer. DVT study negative. RV smaller than LV so lower concern for PE.  -Heparin subq.    ID:  -Rhino/enterovirus positive.  -No e/o bacterial infection at this time.  -Monitoring off antibiotics.   -Quant gold indeterminate. Low suspicion. First AFB sputum culture negative.     Ethics:  -Full Code
Mr. Zach Ramos is a 43 year old male pmhx of DM2 on insulin w/ diabetic retinopathy/neuropahy, CKD stage IV on transplant list, HTN, HLD, HFpEF on torsemide who presented to  ED for SOB. Was placed on BiPAP in ED w/ improvement, transferred to [facility] for possible HD.     Neuro:  -Not in any pain. Mental status at baseline.    CV:  -Volume overloaded state.  -Continue with lasix gtt.  -Labetalol 200mg q 8hrs with holding parameters for HTN.    Respiratory:  -Hypoxic respiratory failure 2/2 fluid overloaded state. Continue diuresis with lasix gtt.   -Rhino-entero virus positive.  -Will monitor off antibiotics.    GI:  -Renal diet.  -LFTs mildly elevated. Will continue to trend. Likely congestive hepatopathy.    Renal:  -Worsening creatinine 2/2 diabetic nephropathy. Listed for transplant.  -Nephrology following.  -Continuing lasix drip.   -Sodium bicarb PO.  -High phos- c/w phoslo.      Heme:  -Anemia of chronic disease.  -Transfuse <7.  -elevated ddimer. DVT study negative. RV smaller than LV so lower concern for PE.  -Heparin subq.    Ethics:  -Full Code    I spent 35 minutes providing critical care for this patient. This includes reviewing labs, consultant notes, vital signs, ins and outs, medication list, any imaging obtained, examining and assessing patient, discussing with patient and/or HCP or representative of patient. This does not include any procedure related time.
Mr. Zach Ramos is a 43 year old male pmhx of DM2 on insulin w/ diabetic retinopathy/neuropahy, CKD stage IV on transplant list, HTN, HLD, HFpEF on torsemide who presented to  ED for SOB. Was placed on BiPAP in ED w/ improvement, transferred to [facility] for possible HD.     Neuro:  -Not in any pain. Mental status at baseline.    CV:  -Volume overloaded state which has significantly improved. Patient put out 10L with diuresis. Changed to torsemide yesterday with continued adequate diuresis.  -Labetalol 200mg q 8hrs with holding parameters for HTN.    Respiratory:  -Hypoxic respiratory failure 2/2 fluid overloaded state has resolved. Patient is on room air.   -Rhino-entero virus positive.  -Will monitor off antibiotics.    GI:  -Renal diet.  -LFTs mildly elevated, improving.     Renal:  -Worsening creatinine 2/2 diabetic nephropathy. Listed for transplant.  -Nephrology following.  -Changed to torsemide PO yesterday with adequate diuresis.   -Sodium bicarb PO.  -High phos- c/w phoslo.    -Patient had plans to start peritoneal dialysis. Patient stable for discharge and follow up with his nephrologist closely.     Endocrine:  -Hemoglobin a1c 5.9%.  -Increased lantus to home dose 7 units. Added pre-meals.  -ISS, FS qac.    Heme:  -Anemia of chronic disease.  -Transfuse <7. S/p 1 unit 10/3/23 with good response.  -elevated ddimer. DVT study negative. RV smaller than LV so lower concern for PE.  -Heparin subq.    ID:  -Rhino/enterovirus positive.  -No e/o bacterial infection at this time.  -Monitoring off antibiotics.   -Quant gold indeterminate. Low suspicion. First AFB sputum culture negative.     Ethics:  -Full Code    Dispo: to home with close nephrology follow up.
Mr. Zach Ramos is a 43 year old male pmhx of DM2 on insulin w/ diabetic retinopathy/neuropahy, CKD stage IV on transplant list, HTN, HLD, HFpEF on torsemide who presented to  ED for SOB. Was placed on BiPAP in ED w/ improvement, transferred to [facility]\"" for possible HD.     Neuro:  -Not in any pain. Mental status at baseline.    CV:  -Volume overloaded state.  -Continue with lasix gtt. Diuresing well.  -Labetalol 200mg q 8hrs with holding parameters for HTN.    Respiratory:  -Hypoxic respiratory failure 2/2 fluid overloaded state. Continue diuresis with lasix gtt. Saturating well on 4L nasal cannula.  -Rhino-entero virus positive.  -Will monitor off antibiotics.    GI:  -Renal diet.  -LFTs mildly elevated. Will continue to trend. Likely congestive hepatopathy.    Renal:  -Worsening creatinine 2/2 diabetic nephropathy. Listed for transplant.  -Nephrology following.  -Continuing lasix drip.   -Sodium bicarb PO.  -High phos- c/w phoslo.    -Patient had plans to start peritoneal dialysis. Nephrologist will reach out to pt's to ascertain plan. No indication for urgent HD today.    Endocrine:  -Hemoglobin a1c 5.9%.  -Increased lantus to home dose 7 units. Added pre-meals.  -ISS, FS qac.    Heme:  -Anemia of chronic disease.  -Transfuse <7. S/p 1 unit today.  -elevated ddimer. DVT study negative. RV smaller than LV so lower concern for PE.  -Heparin subq.    ID:  -Rhino/enterovirus positive.  -No e/o bacterial infection at this time.  -Monitoring off antibiotics.     Ethics:  -Full Code

## 2023-10-05 NOTE — PROGRESS NOTE ADULT - PROBLEM/PLAN-5
From: Sebastian Meyers  To: Sukhjinder Cristobal  Sent: 5/27/2021 9:12 PM CDT  Subject: Imaging Question    This message is being sent by Katya Ba on behalf of Sebastian Meyers.    Hi-  We saw you quite a while ago via video visit and Arjun was supposed to get an MRI. The only person who schedules MRI with sedation has played phone tag with me repeatedly and then completely stopped returning my messages. His ophthalmologist was pretty adamant that he get this and made it clear it should have been done already when we saw her last. Is there any way you guys are able to have the specific person who schedules MRI with sedation reach out to me? Unless he doesn't need the sedation?   Thanks,  Katya Ba  
DISPLAY PLAN FREE TEXT

## 2023-10-07 LAB
CULTURE RESULTS: SIGNIFICANT CHANGE UP
CULTURE RESULTS: SIGNIFICANT CHANGE UP
SPECIMEN SOURCE: SIGNIFICANT CHANGE UP
SPECIMEN SOURCE: SIGNIFICANT CHANGE UP

## 2023-10-09 ENCOUNTER — APPOINTMENT (OUTPATIENT)
Dept: NEPHROLOGY | Facility: CLINIC | Age: 43
End: 2023-10-09
Payer: COMMERCIAL

## 2023-10-09 ENCOUNTER — APPOINTMENT (OUTPATIENT)
Dept: INTERNAL MEDICINE | Facility: CLINIC | Age: 43
End: 2023-10-09
Payer: COMMERCIAL

## 2023-10-09 VITALS
BODY MASS INDEX: 31.06 KG/M2 | HEART RATE: 99 BPM | WEIGHT: 242 LBS | OXYGEN SATURATION: 95 % | DIASTOLIC BLOOD PRESSURE: 85 MMHG | SYSTOLIC BLOOD PRESSURE: 128 MMHG | HEIGHT: 74 IN

## 2023-10-09 VITALS
DIASTOLIC BLOOD PRESSURE: 92 MMHG | TEMPERATURE: 97.5 F | BODY MASS INDEX: 31.12 KG/M2 | WEIGHT: 242.5 LBS | OXYGEN SATURATION: 97 % | HEART RATE: 97 BPM | HEIGHT: 74 IN | SYSTOLIC BLOOD PRESSURE: 155 MMHG

## 2023-10-09 DIAGNOSIS — Z09 ENCOUNTER FOR FOLLOW-UP EXAMINATION AFTER COMPLETED TREATMENT FOR CONDITIONS OTHER THAN MALIGNANT NEOPLASM: ICD-10-CM

## 2023-10-09 LAB — HBA1C MFR BLD HPLC: 5.8

## 2023-10-09 PROCEDURE — 99496 TRANSJ CARE MGMT HIGH F2F 7D: CPT

## 2023-10-09 PROCEDURE — 99214 OFFICE O/P EST MOD 30 MIN: CPT | Mod: 25

## 2023-10-09 PROCEDURE — 83036 HEMOGLOBIN GLYCOSYLATED A1C: CPT | Mod: QW

## 2023-10-09 PROCEDURE — 96372 THER/PROPH/DIAG INJ SC/IM: CPT

## 2023-10-09 RX ORDER — ERYTHROPOIETIN 10000 [IU]/ML
10000 INJECTION, SOLUTION INTRAVENOUS; SUBCUTANEOUS
Qty: 1 | Refills: 0 | Status: COMPLETED | OUTPATIENT
Start: 2023-10-09

## 2023-10-09 RX ORDER — AMLODIPINE BESYLATE 10 MG/1
10 TABLET ORAL
Qty: 90 | Refills: 3 | Status: DISCONTINUED | COMMUNITY
Start: 2023-03-10 | End: 2023-10-09

## 2023-10-09 RX ADMIN — ERYTHROPOIETIN 0 UNIT/ML: 10000 INJECTION, SOLUTION INTRAVENOUS; SUBCUTANEOUS at 00:00

## 2023-10-10 LAB
ALBUMIN SERPL ELPH-MCNC: 3.5 G/DL
ANION GAP SERPL CALC-SCNC: 15 MMOL/L
BUN SERPL-MCNC: 85 MG/DL
CALCIUM SERPL-MCNC: 7.8 MG/DL
CHLORIDE SERPL-SCNC: 95 MMOL/L
CO2 SERPL-SCNC: 23 MMOL/L
CREAT SERPL-MCNC: 5.8 MG/DL
CREAT SPEC-SCNC: 59 MG/DL
EGFR: 12 ML/MIN/1.73M2
FERRITIN SERPL-MCNC: 513 NG/ML
GLUCOSE SERPL-MCNC: 130 MG/DL
HCT VFR BLD CALC: 28.3 %
HGB BLD-MCNC: 8.7 G/DL
IRON SATN MFR SERPL: 11 %
IRON SERPL-MCNC: 33 UG/DL
MCHC RBC-ENTMCNC: 23.3 PG
MCHC RBC-ENTMCNC: 30.7 GM/DL
MCV RBC AUTO: 75.9 FL
MICROALBUMIN 24H UR DL<=1MG/L-MCNC: 227.1 MG/DL
MICROALBUMIN/CREAT 24H UR-RTO: 3876 MG/G
PHOSPHATE SERPL-MCNC: 6.6 MG/DL
PLATELET # BLD AUTO: 348 K/UL
POTASSIUM SERPL-SCNC: 3.8 MMOL/L
RBC # BLD: 3.73 M/UL
RBC # FLD: 15.3 %
SODIUM SERPL-SCNC: 133 MMOL/L
TIBC SERPL-MCNC: 306 UG/DL
UIBC SERPL-MCNC: 273 UG/DL
WBC # FLD AUTO: 13.45 K/UL

## 2023-10-11 ENCOUNTER — NON-APPOINTMENT (OUTPATIENT)
Age: 43
End: 2023-10-11

## 2023-10-11 LAB
M TB IFN-G BLD-IMP: POSITIVE
QUANTIFERON TB PLUS MITOGEN MINUS NIL: 2.1 IU/ML
QUANTIFERON TB PLUS NIL: 0.03 IU/ML
QUANTIFERON TB PLUS TB1 MINUS NIL: 0.06 IU/ML
QUANTIFERON TB PLUS TB2 MINUS NIL: 0.35 IU/ML

## 2023-10-12 ENCOUNTER — APPOINTMENT (OUTPATIENT)
Dept: WOUND CARE | Facility: CLINIC | Age: 43
End: 2023-10-12

## 2023-10-12 ENCOUNTER — APPOINTMENT (OUTPATIENT)
Dept: TRANSPLANT | Facility: CLINIC | Age: 43
End: 2023-10-12

## 2023-10-12 ENCOUNTER — INPATIENT (INPATIENT)
Facility: HOSPITAL | Age: 43
LOS: 2 days | Discharge: HOME | End: 2023-10-15
Attending: INTERNAL MEDICINE | Admitting: INTERNAL MEDICINE
Payer: COMMERCIAL

## 2023-10-12 VITALS
RESPIRATION RATE: 18 BRPM | OXYGEN SATURATION: 100 % | DIASTOLIC BLOOD PRESSURE: 76 MMHG | SYSTOLIC BLOOD PRESSURE: 121 MMHG | HEIGHT: 74 IN | HEART RATE: 90 BPM | TEMPERATURE: 98 F

## 2023-10-12 DIAGNOSIS — Z98.890 OTHER SPECIFIED POSTPROCEDURAL STATES: Chronic | ICD-10-CM

## 2023-10-12 DIAGNOSIS — R68.89 OTHER GENERAL SYMPTOMS AND SIGNS: ICD-10-CM

## 2023-10-12 LAB
ALBUMIN SERPL ELPH-MCNC: 3.3 G/DL — SIGNIFICANT CHANGE UP (ref 3.3–5)
ALP SERPL-CCNC: 69 U/L — SIGNIFICANT CHANGE UP (ref 40–120)
ALT FLD-CCNC: 33 U/L — SIGNIFICANT CHANGE UP (ref 4–41)
ANION GAP SERPL CALC-SCNC: 13 MMOL/L — SIGNIFICANT CHANGE UP (ref 7–14)
AST SERPL-CCNC: 24 U/L — SIGNIFICANT CHANGE UP (ref 4–40)
B PERT DNA SPEC QL NAA+PROBE: SIGNIFICANT CHANGE UP
B PERT+PARAPERT DNA PNL SPEC NAA+PROBE: SIGNIFICANT CHANGE UP
BASE EXCESS BLDV CALC-SCNC: -2.8 MMOL/L — LOW (ref -2–3)
BASOPHILS # BLD AUTO: 0.07 K/UL — SIGNIFICANT CHANGE UP (ref 0–0.2)
BASOPHILS NFR BLD AUTO: 0.5 % — SIGNIFICANT CHANGE UP (ref 0–2)
BILIRUB SERPL-MCNC: 0.3 MG/DL — SIGNIFICANT CHANGE UP (ref 0.2–1.2)
BORDETELLA PARAPERTUSSIS (RAPRVP): SIGNIFICANT CHANGE UP
BUN SERPL-MCNC: 77 MG/DL — HIGH (ref 7–23)
C PNEUM DNA SPEC QL NAA+PROBE: SIGNIFICANT CHANGE UP
CA-I SERPL-SCNC: 0.97 MMOL/L — LOW (ref 1.15–1.33)
CALCIUM SERPL-MCNC: 7.6 MG/DL — LOW (ref 8.4–10.5)
CHLORIDE BLDV-SCNC: 98 MMOL/L — SIGNIFICANT CHANGE UP (ref 96–108)
CHLORIDE SERPL-SCNC: 97 MMOL/L — LOW (ref 98–107)
CO2 BLDV-SCNC: 23.9 MMOL/L — SIGNIFICANT CHANGE UP (ref 22–26)
CO2 SERPL-SCNC: 22 MMOL/L — SIGNIFICANT CHANGE UP (ref 22–31)
CREAT SERPL-MCNC: 5.46 MG/DL — HIGH (ref 0.5–1.3)
EGFR: 13 ML/MIN/1.73M2 — LOW
EOSINOPHIL # BLD AUTO: 0.8 K/UL — HIGH (ref 0–0.5)
EOSINOPHIL NFR BLD AUTO: 5.2 % — SIGNIFICANT CHANGE UP (ref 0–6)
FLUAV SUBTYP SPEC NAA+PROBE: SIGNIFICANT CHANGE UP
FLUBV RNA SPEC QL NAA+PROBE: SIGNIFICANT CHANGE UP
GAS PNL BLDV: 131 MMOL/L — LOW (ref 136–145)
GAS PNL BLDV: SIGNIFICANT CHANGE UP
GLUCOSE BLDC GLUCOMTR-MCNC: 166 MG/DL — HIGH (ref 70–99)
GLUCOSE BLDC GLUCOMTR-MCNC: 179 MG/DL — HIGH (ref 70–99)
GLUCOSE BLDV-MCNC: 132 MG/DL — HIGH (ref 70–99)
GLUCOSE SERPL-MCNC: 131 MG/DL — HIGH (ref 70–99)
HADV DNA SPEC QL NAA+PROBE: SIGNIFICANT CHANGE UP
HCO3 BLDV-SCNC: 23 MMOL/L — SIGNIFICANT CHANGE UP (ref 22–29)
HCOV 229E RNA SPEC QL NAA+PROBE: SIGNIFICANT CHANGE UP
HCOV HKU1 RNA SPEC QL NAA+PROBE: SIGNIFICANT CHANGE UP
HCOV NL63 RNA SPEC QL NAA+PROBE: SIGNIFICANT CHANGE UP
HCOV OC43 RNA SPEC QL NAA+PROBE: SIGNIFICANT CHANGE UP
HCT VFR BLD CALC: 27.3 % — LOW (ref 39–50)
HCT VFR BLDA CALC: 27 % — LOW (ref 39–51)
HGB BLD CALC-MCNC: 9 G/DL — LOW (ref 12.6–17.4)
HGB BLD-MCNC: 8.7 G/DL — LOW (ref 13–17)
HMPV RNA SPEC QL NAA+PROBE: SIGNIFICANT CHANGE UP
HPIV1 RNA SPEC QL NAA+PROBE: SIGNIFICANT CHANGE UP
HPIV2 RNA SPEC QL NAA+PROBE: SIGNIFICANT CHANGE UP
HPIV3 RNA SPEC QL NAA+PROBE: SIGNIFICANT CHANGE UP
HPIV4 RNA SPEC QL NAA+PROBE: SIGNIFICANT CHANGE UP
IANC: 11.97 K/UL — HIGH (ref 1.8–7.4)
IMM GRANULOCYTES NFR BLD AUTO: 0.6 % — SIGNIFICANT CHANGE UP (ref 0–0.9)
LACTATE BLDV-MCNC: 0.8 MMOL/L — SIGNIFICANT CHANGE UP (ref 0.5–2)
LYMPHOCYTES # BLD AUTO: 1.27 K/UL — SIGNIFICANT CHANGE UP (ref 1–3.3)
LYMPHOCYTES # BLD AUTO: 8.3 % — LOW (ref 13–44)
M PNEUMO DNA SPEC QL NAA+PROBE: SIGNIFICANT CHANGE UP
MCHC RBC-ENTMCNC: 23.6 PG — LOW (ref 27–34)
MCHC RBC-ENTMCNC: 31.9 GM/DL — LOW (ref 32–36)
MCV RBC AUTO: 74.2 FL — LOW (ref 80–100)
MONOCYTES # BLD AUTO: 1.04 K/UL — HIGH (ref 0–0.9)
MONOCYTES NFR BLD AUTO: 6.8 % — SIGNIFICANT CHANGE UP (ref 2–14)
NEUTROPHILS # BLD AUTO: 11.97 K/UL — HIGH (ref 1.8–7.4)
NEUTROPHILS NFR BLD AUTO: 78.6 % — HIGH (ref 43–77)
NRBC # BLD: 0 /100 WBCS — SIGNIFICANT CHANGE UP (ref 0–0)
NRBC # FLD: 0 K/UL — SIGNIFICANT CHANGE UP (ref 0–0)
PCO2 BLDV: 41 MMHG — LOW (ref 42–55)
PH BLDV: 7.35 — SIGNIFICANT CHANGE UP (ref 7.32–7.43)
PLATELET # BLD AUTO: 351 K/UL — SIGNIFICANT CHANGE UP (ref 150–400)
PO2 BLDV: 86 MMHG — HIGH (ref 25–45)
POTASSIUM BLDV-SCNC: 4.1 MMOL/L — SIGNIFICANT CHANGE UP (ref 3.5–5.1)
POTASSIUM SERPL-MCNC: 4.2 MMOL/L — SIGNIFICANT CHANGE UP (ref 3.5–5.3)
POTASSIUM SERPL-SCNC: 4.2 MMOL/L — SIGNIFICANT CHANGE UP (ref 3.5–5.3)
PROT SERPL-MCNC: 7 G/DL — SIGNIFICANT CHANGE UP (ref 6–8.3)
RAPID RVP RESULT: SIGNIFICANT CHANGE UP
RBC # BLD: 3.68 M/UL — LOW (ref 4.2–5.8)
RBC # FLD: 15.2 % — HIGH (ref 10.3–14.5)
RSV RNA SPEC QL NAA+PROBE: SIGNIFICANT CHANGE UP
RV+EV RNA SPEC QL NAA+PROBE: SIGNIFICANT CHANGE UP
SAO2 % BLDV: 97.8 % — HIGH (ref 67–88)
SARS-COV-2 RNA SPEC QL NAA+PROBE: SIGNIFICANT CHANGE UP
SODIUM SERPL-SCNC: 132 MMOL/L — LOW (ref 135–145)
WBC # BLD: 15.24 K/UL — HIGH (ref 3.8–10.5)
WBC # FLD AUTO: 15.24 K/UL — HIGH (ref 3.8–10.5)

## 2023-10-12 PROCEDURE — 99223 1ST HOSP IP/OBS HIGH 75: CPT

## 2023-10-12 PROCEDURE — 99285 EMERGENCY DEPT VISIT HI MDM: CPT

## 2023-10-12 PROCEDURE — 71250 CT THORAX DX C-: CPT | Mod: 26

## 2023-10-12 PROCEDURE — 99222 1ST HOSP IP/OBS MODERATE 55: CPT

## 2023-10-12 PROCEDURE — 93010 ELECTROCARDIOGRAM REPORT: CPT

## 2023-10-12 RX ORDER — INFLUENZA VIRUS VACCINE 15; 15; 15; 15 UG/.5ML; UG/.5ML; UG/.5ML; UG/.5ML
0.5 SUSPENSION INTRAMUSCULAR ONCE
Refills: 0 | Status: DISCONTINUED | OUTPATIENT
Start: 2023-10-12 | End: 2023-10-15

## 2023-10-12 NOTE — ED PROVIDER NOTE - PROGRESS NOTE DETAILS
Lisa Head, PGY1    ID consulted, will assess  3:03pm Lisa Head, PGY1     ID recommending admission for 3 repeat negative sputum cultures.

## 2023-10-12 NOTE — ED PROVIDER NOTE - CLINICAL SUMMARY MEDICAL DECISION MAKING FREE TEXT BOX
44 yo M w PMHx of CKD, DM on insulin presents to ED sent by PMD to r/o TB b/c he was c/o SOB and bloody cough. Pt states he recently returned from [facility] on Sept 28th, was admitted to [place] ICU for acute respitory failure 2/2 RSV. On CXR In [place], with questionable TB, so tested sputum culture which was negative. However upon DC, PMD did quantiferon on Monday, which returned positive today, and suggested coming to ED for CT chest. Pt c/o 2 weeks of bloody cough, intermittent night sweats, fevers. Denies nausea, vomiting, dizziness, chest pain, SOB, abdominal pain, dysuria, hematuria. VSS. Clinically stable. Physical exam unremarkable, unlabored breaths, in no acute distress. Will obtain cbc/cmp for anemia/lytes, CT chest to r/o TB. 44 yo M w PMHx of CKD, DM on insulin presents to ED sent by PMD to r/o TB b/c he was c/o SOB and bloody cough. Pt states he recently returned from [facility] on Sept 28th, was admitted to [place] ICU for acute respitory failure 2/2 RSV. On CXR In [place], with questionable TB, so tested sputum culture which was negative. However upon DC, PMD did quantiferon on Monday, which returned positive today, and suggested coming to ED for CT chest. Pt c/o 2 weeks of bloody cough, intermittent night sweats, fevers. Did not receive BCG vaccine. Denies nausea, vomiting, dizziness, chest pain, SOB, abdominal pain, dysuria, hematuria. VSS. Clinically stable. Physical exam unremarkable, unlabored breaths, in no acute distress. Suspicion for TB vs. viral illness vs. cancer. Will obtain cbc/cmp for anemia/lytes, CT chest to r/o TB. 44 yo M w PMHx of CKD, DM on insulin presents to ED sent by PMD to r/o TB b/c he was c/o SOB and bloody cough. Pt states he recently returned from [facility] on Sept 28th, was admitted to [place] ICU for acute respitory failure 2/2 RSV. On CXR In [place], with questionable TB, so tested sputum culture which was negative. However upon DC, PMD did quantiferon on Monday, which returned positive today, and suggested coming to ED for CT chest. Pt c/o 2 weeks of bloody cough, intermittent night sweats, fevers. Did not receive BCG vaccine. Denies nausea, vomiting, dizziness, chest pain, SOB, abdominal pain, dysuria, hematuria. VSS. Clinically stable. Physical exam unremarkable, unlabored breaths, in no acute distress. Suspicion for TB vs. viral illness vs. cancer. Will obtain cbc/cmp for anemia/lytes, CT chest to r/o TB. Dispo pending ID recs

## 2023-10-12 NOTE — PATIENT PROFILE ADULT - FALL HARM RISK - HARM RISK INTERVENTIONS

## 2023-10-12 NOTE — H&P ADULT - PROBLEM SELECTOR PLAN 1
Pt with an indeterminate Quant Gold result on 10/2/23 after presenting to an OSH on 10/1/23 with shortness of breath, fevers, night sweats, nonproductive cough, and coffee ground emesis following a 2-week vacation to [facility] and [facility] [place]. Pt with a positive Quant Gold result from Monday as outpatient. Pt currently only with nonproductive cough, no other symptoms.  - ID consulted on admission, appreciate recs  - CT chest noncontrast with Pt with an indeterminate Quant Gold result on 10/2/23 after presenting to an OSH on 10/1/23 with shortness of breath, fevers, night sweats, nonproductive cough, and coffee ground emesis following a 2-week vacation to [facility] and [facility] [place]. Pt with a positive Quant Gold result from Monday as outpatient. Pt currently only with nonproductive cough, no other symptoms. Pt with history of latent TB, treated ~30 years ago. Positive Quant Gold likely indicative of prior exposure to TB, not necessarily indicative of active TB.   - ID consulted on admission, appreciate recs  - CT chest noncontrast showing marked interval improvement in airspace disease compared to chest Xray of 10/1/2023, with a residual cluster of nodular opacities within the medial right lower lobe  - Per ID, obtain 2 more sputums for AFB, and if 2 AFB smears/cultures are negative, then can discontinue TB for isolation, as pt with negative CT chest  - For now, keep airborne isolation in place

## 2023-10-12 NOTE — H&P ADULT - PROBLEM SELECTOR PLAN 2
CT chest noncontrast showing small pericardial effusion CT chest noncontrast showing small pericardial effusion. TTE on 10/2/23 demonstrating trace pericardial effusion. Can be from volume overload, though given recent viral infection with entero/rhinovirus, pericardial effusion can be in setting of viral pericarditis. Lower suspicion for TB pericarditis. However, pt with no chest pain or shortness of breath and EKG with no diffuse LEXII.  - Repeat TTE ordered   - Check ESR, CRP, and hs-trop  - Will hold off on starting treatment for possible pericarditis at this time

## 2023-10-12 NOTE — ED ADULT TRIAGE NOTE - AS TEMP SITE
Pt mother is requesting letter from office with Pt underlying medical conditions in order to schedule a COVID vaccine. Please advise.   oral

## 2023-10-12 NOTE — H&P ADULT - PROBLEM SELECTOR PLAN 6
SCr 5.46 on admission, appears to be at baseline.   - Monitor SCr and urine output  - Avoid NSAIDs, contrast, and nephrotoxic agents  - Check serum Ca and Phos  - C/w home torsemide 80 mg BID  - C/w calcium acetate 1667 mg TID   - C/w sodium bicarb 650 mg BID  - Fluid restrict to 1200 cc/day  - Renally dose meds

## 2023-10-12 NOTE — H&P ADULT - NSHPLABSRESULTS_GEN_ALL_CORE
EKG personally reviewed.  NSR at 95 bpm, no acute ischemic changes, QTc 490 ms.    Labs personally reviewed.                        8.7    15.24 )-----------( 351      ( 12 Oct 2023 14:05 )             27.3     10-12    132<L>  |  97<L>  |  77<H>  ----------------------------<  131<H>  4.2   |  22  |  5.46<H>    Ca    7.6<L>      12 Oct 2023 14:05    TPro  7.0  /  Alb  3.3  /  TBili  0.3  /  DBili  x   /  AST  24  /  ALT  33  /  AlkPhos  69  10-12    VBG pH 7.35, lactate 0.8, pCO2 41    Imaging personally reviewed.  ACC: [id number] EXAM:  CT CHEST   ORDERED BY: NEYDA MESSINA   PROCEDURE DATE:  10/12/2023      INTERPRETATION:  CLINICAL INFORMATION: Shortness of breath and bloody   cough , positive Quantiferon assay, no history of BCG vaccine    COMPARISON: Chest x-ray 10/1/2023    CONTRAST/COMPLICATIONS:  IV Contrast: None  Oral Contrast: None  Complications: None    PROCEDURE:  CT of the Chest was performed.  Sagittal and coronal reformats were performed.    FINDINGS:  LUNGS AND AIRWAYS: Patent central airways.  Marked interval improvement   in airspace disease compared to chest x-ray of 10/1/2023, with a residual   cluster of nodular opacities within the medial right lower lobe.  PLEURA: Trace right pleural effusion  MEDIASTINUM AND JJ: A few calcified mediastinal lymph nodes. No   enlarged lymph nodes.  VESSELS: Within normal limits.  HEART: Heart is enlarged. Small pericardial effusion.  CHEST WALL AND LOWER NECK: Within normal limits.  VISUALIZED UPPER ABDOMEN: Bilateral perinephric fat stranding is nonspecific.  BONES: Within normal limits.    IMPRESSION:  1.  Marked interval improvement in airspace disease compared to chest   x-ray 10/1/2023, with a nonspecific residual cluster of nodular opacities   within the medial right lower lobe.  2.  Small calcified mediastinal lymph nodes suggest prior granulomatous   infection.  3.  Cardiomegaly with small pericardial effusion.

## 2023-10-12 NOTE — ED ADULT NURSE NOTE - OBJECTIVE STATEMENT
Facilitator RN- Patient received in stretcher. AOX4. Respirations even and unlabored. Spontaneous movement of all extremities noted. Presents to ER c/o " my TB test resulted positive" As per patient he was recently admitted to [place] [facility] for PNA and RSV. Patient reports he was DCed on Friday and was called today with result. Patient report some chest tightness but denies pain, fevers, chills, N/V/D, bloody sputum or night sweats. As per patient he recently returned for [facility] about 2 weeks ago. IV placed. Labs drawn. Endorsed to primary RN. Comfort and safety maintained. All current care needs met. Care plan continued Farhat HOWARD

## 2023-10-12 NOTE — ED PROVIDER NOTE - NSICDXPASTMEDICALHX_GEN_ALL_CORE_FT
PAST MEDICAL HISTORY:  History of diabetic retinopathy     HLD (hyperlipidemia)     HTN (hypertension)     Insulin dependent type 2 diabetes mellitus     Stage 4 chronic kidney disease     Type 2 diabetes mellitus with complication, without long-term current use of insulin

## 2023-10-12 NOTE — PATIENT PROFILE ADULT - INTERNATIONAL TRAVEL DAYS 1
Has The Cancer Been Biopsied Before?: has been previously biopsied
Who Is Your Referring Provider?: Dr. Sammie Subramanian
When Was Your Biopsy?: 02/13/2020
Year Removed: 1900
7-14 days (Gabriele)

## 2023-10-12 NOTE — H&P ADULT - PROBLEM SELECTOR PROBLEM 6
Type 2 diabetes mellitus treated with insulin Stage 5 chronic kidney disease not on chronic dialysis

## 2023-10-12 NOTE — H&P ADULT - PROBLEM SELECTOR PLAN 3
WBC 15.24 on admission WBC 15.24 on admission. Pt afebrile with no localizing S/S of infection. Possibly from pericarditis as above.  - Trend CBC with diff  - Monitor off antibiotics for now

## 2023-10-12 NOTE — H&P ADULT - NSHPPHYSICALEXAM_GEN_ALL_CORE
Vital Signs Last 24 Hrs  T(C): 36.7 (13 Oct 2023 01:37), Max: 36.9 (12 Oct 2023 17:14)  T(F): 98 (13 Oct 2023 01:37), Max: 98.5 (12 Oct 2023 17:14)  HR: 89 (13 Oct 2023 01:37) (89 - 100)  BP: 124/78 (13 Oct 2023 01:37) (121/76 - 146/90)  BP(mean): --  RR: 18 (13 Oct 2023 01:37) (18 - 18)  SpO2: 98% (13 Oct 2023 01:37) (98% - 100%)    PHYSICAL EXAM:  General: Awake and alert.  No acute distress.  Head: Normocephalic, atraumatic.    Eyes: PERRL.  EOMI.  No scleral icterus.  No conjunctival pallor.  Mouth: Moist MM.  No oropharyngeal exudates.    Neck: Supple.  Full range of motion.  No JVD.  No LAD.  No thyromegaly.  Trachea midline.    Heart: RRR.  Normal S1 and S2.  No murmurs, rubs, or gallops.  Trace pitting LE edema b/l.   Lungs: Nonlabored breathing.  Good inspiratory effort.  CTAB.  No wheezes, crackles, or rhonchi.    Abdomen: BS+, soft, nontender with no rebound or guarding, nondistended.  No hepatomegaly.   Skin: Warm and dry.  No rashes.   Extremities: No cyanosis.  2+ peripheral pulses b/l.  Musculoskeletal: No joint deformities.  No spinal or paraspinal tenderness.  Neuro: A&Ox3.  CN II-XII intact.  5/5 motor strength in UE and LE b/l.  Tactile sensation intact in UE and LE b/l.  No focal deficits.  Psychiatric: Normal mood and full affect.

## 2023-10-12 NOTE — CONSULT NOTE ADULT - ASSESSMENT
43 M T2DM and CKD presents today to r/o TB.  Positive quanteferon gold  Chest xray abnormal 10/2/2023  1 Sputum for AFB has negative smear and negative culture at 1 week from 10/2/2023  Leukocytosis noted    REC:  Pt with positive quantiferon gold, indicative of prior exposure to TB.  Not necessarily indicative of active TB.   No clear findings of TB b/c pt had reason for abnormal xray with viral infection last admission and AFB sputum is negative to date with negative smear.    1)Would get CT chest noncontrast  2) Would get 2 more sputums for AFB. If 2 smears are negative and CT chest not concerning for TB can d/c isolation.  3) For now, would keep airborn isolation in place.  4) Pt with leukocytosis - trend WBC.    ID will follow.  I am away tomorrow but my associate will see pt.  Discussed at length with ED team    Latisha Brown MD  [phone] (pager)  [phone] (office)

## 2023-10-12 NOTE — CONSULT NOTE ADULT - SUBJECTIVE AND OBJECTIVE BOX
Patient is a 43y old  Male who presents with a chief complaint of r/o TB    HPI:  43 M T2DM and CKD presents today to r/o TB.  Originally born [place], moved to [place] 1985ish. Travels often and returned from 2 week trip to [facility] end of September. On return had cough, hemoptysis and was admitted to [facility] for entero/rhinovirus with pneumonia and hypoxia.  Quant gold done there was indeterminant.  Repeated outpt and was positive. Told to come to ER.    Now no fevers, chills, night sweats, wt loss, lymphadenopathy. Has some residual cough. Had abnormal chest xray in [place]. CT chest not done.    Never treated for TB in past. No known exposures.  No more hemoptysis he tells.  Lives alone  Single  Works in IT    PAST MEDICAL & SURGICAL HISTORY:  Type 2 diabetes mellitus with complication, without long-term current use of insulin      Insulin dependent type 2 diabetes mellitus      Stage 4 chronic kidney disease      HTN (hypertension)      HLD (hyperlipidemia)      History of diabetic retinopathy      PSH - Right foot surgery      History of incision and drainage          Social history: no smoking, occ etoh, no drugs    FAMILY HISTORY:  Family history of insulin dependent diabetes mellitus (Mother)      Allergies    No Known Allergies    Intolerances        Antimicrobials:          Vital Signs Last 24 Hrs  T(C): 36.8 (12 Oct 2023 12:10), Max: 36.8 (12 Oct 2023 12:10)  T(F): 98.3 (12 Oct 2023 12:10), Max: 98.3 (12 Oct 2023 12:10)  HR: 90 (12 Oct 2023 12:10) (90 - 90)  BP: 121/76 (12 Oct 2023 12:10) (121/76 - 121/76)  BP(mean): --  RR: 18 (12 Oct 2023 12:10) (18 - 18)  SpO2: 100% (12 Oct 2023 12:10) (100% - 100%)    Parameters below as of 12 Oct 2023 12:10  Patient On (Oxygen Delivery Method): room air      EXAM:  GEN: NAD, AOX3  HEENT: EOMI, neck supple  CV: S1S2 RRR  Pulm: CTA b/l   Abd: +BS, soft  Ext + edema b/l, scar right ankle                            8.7    15.24 )-----------( 351      ( 12 Oct 2023 14:05 )             27.3         10-12    132<L>  |  97<L>  |  77<H>  ----------------------------<  131<H>  4.2   |  22  |  5.46<H>    Ca    7.6<L>      12 Oct 2023 14:05    TPro  7.0  /  Alb  3.3  /  TBili  0.3  /  DBili  x   /  AST  24  /  ALT  33  /  AlkPhos  69  10-12      RECENT CULTURES:      MICROBIOLOGY:  Respiratory Viral Panel with COVID-19 by KARLA (10.12.23 @ 14:05)   Rapid RVP Result: NotDetecCulture - Acid Fast - Sputum w/Smear . (10.02.23 @ 13:00)   Specimen Source: .Sputum Sputum  Acid Fast Bacilli Smear:   No acid-fast bacilli seen by fluorochrome stain  Culture Results:   No acid-fast bacilli isolated at 1 week. ****Acid-fast cultures are held   for 6 weeks.****          Radiology:      CT chest pending  < from: Xray Chest 1 View- PORTABLE-Urgent (Xray Chest 1 View- PORTABLE-Urgent .) (10.01.23 @ 21:10) >    IMPRESSION: Bilateral infiltrates right much greater than left.    < end of copied text >

## 2023-10-12 NOTE — ED ADULT NURSE REASSESSMENT NOTE - NS ED NURSE REASSESS COMMENT FT1
Report given by Day RN. Pt sitting up in bed comfortably. Pt denies any pain or discomfort. Report given to Floor. Pt waiting for transport.
Primary RN: Patient started by yves RN, A&Ox4, ambulatory, denies any complaints of pain, chest pain, SOB, N/V/D, abdominal pain, fevers, chills, headache, dizziness at this time. RR equal and unlabored. Care plan continued. Comfort measures provided. Safety maintained. Awaiting bed assignment.

## 2023-10-12 NOTE — H&P ADULT - PROBLEM SELECTOR PLAN 4
Serum Na 132. Pt appears euvolemic on exam.   - C/w home torsemide 80 mg BID  - Monitor serum Na  - If hyponatremia worsens, check serum osm, urine osm, and urine Na

## 2023-10-12 NOTE — H&P ADULT - PROBLEM SELECTOR PLAN 5
QTc prolonged at 490 ms on admission EKG.   - Monitor QTc with serial EKGs  - Avoid QT prolonging agents  - Monitor electrolytes and keep serum K >4, Mg >2

## 2023-10-12 NOTE — ED PROVIDER NOTE - PHYSICAL EXAMINATION
Gen: AAOx3, non-toxic  Head: NCAT  HEENT: EOMI, oral mucosa moist, normal conjunctiva  Lung: unlabored breaths  CV: RRR, no murmurs, rubs or gallops  Abd: soft, NTND, no guarding, no CVA tenderness  MSK: no visible deformities  Neuro: No focal sensory or motor deficits  Skin: Warm, well perfused, no rash  Psych: normal affect.

## 2023-10-12 NOTE — H&P ADULT - ASSESSMENT
44 yo man with history of type 2 DM (on insulin), CKD stage 5 (being worked up for possible transplant), HTN, HLD, moderate mitral regurg, latent TB (treated ~30 years ago), and recent ICU admission at [facility] (10/1-10/5/23) for acute hypoxic respiratory failure requiring BiPAP, possibly 2/2 pulm edema vs. PNA, presents after being sent in by his nephrologist for a positive Quant Gold that was drawn on Monday, admitted for r/o active pulm TB. 42 yo man with history of type 2 DM (on insulin), CKD stage 5 (being worked up for possible transplant), HTN, HLD, moderate mitral regurg, latent TB (treated ~30 years ago), and recent ICU admission at [facility] (10/1-10/5/23) for acute hypoxic respiratory failure requiring BiPAP, possibly 2/2 pulm edema vs. PNA, presents after being sent in by his nephrologist for a positive Quant Gold that was drawn on Monday, admitted for r/o active TB.

## 2023-10-12 NOTE — ED ADULT NURSE NOTE - CHIEF COMPLAINT QUOTE
Pt sent by PMD for r/o TB. Positive Quantiferon Gold test. Cough x2 weeks. Bloody sputum on 9/30. Reports night sweats. Resp even and unlabored. Mike BLACKBURN, sick contacts. Returned from [facility] 2 weeks ago.

## 2023-10-12 NOTE — ED PROVIDER NOTE - OBJECTIVE STATEMENT
42 yo M w PMHx of CKD, DM on insulin presents to ED sent by PMD to r/o TB b/c he was c/o SOB and bloody cough. Pt states he recently returned from [facility] on Sept 28th, was admitted to [place] ICU for acute respitory failure 2/2 RSV. On CXR In [place], with questionable TB, so tested sputum culture which was negative. However upon DC, PMD did quantiferon on Monday, which returned positive today, and suggested coming to ED for CT chest. Pt c/o 2 weeks of bloody cough, intermittent night sweats, fevers. Denies nausea, vomiting, dizziness, chest pain, SOB, abdominal pain, dysuria, hematuria. 42 yo M w PMHx of CKD, DM on insulin presents to ED sent by PMD to r/o TB b/c he was c/o SOB and bloody cough. Pt states he recently returned from [facility] on Sept 28th, was admitted to [place] ICU for acute respitory failure 2/2 RSV. On CXR In [place], with questionable TB, so tested sputum culture which was negative. However upon DC, PMD did quantiferon on Monday, which returned positive today, and suggested coming to ED for CT chest. Pt c/o 2 weeks of bloody cough, intermittent night sweats, fevers. Did not receive BCG vaccine. Denies nausea, vomiting, dizziness, chest pain, SOB, abdominal pain, dysuria, hematuria.

## 2023-10-12 NOTE — H&P ADULT - NSHPSOCIALHISTORY_GEN_ALL_CORE
Denies any tobacco or illicit drug use. Drinks alcohol occasionally.  Works in IT at [employer].

## 2023-10-12 NOTE — H&P ADULT - NSHPREVIEWOFSYSTEMS_GEN_ALL_CORE
Constitutional: No generalized weakness, fevers, chills, or weight loss  Eyes: No visual changes, double vision, or eye pain  Ears, Nose, Mouth, Throat: No runny nose, sinus pain, ear pain, tinnitus, sore throat, dysphagia, or odynophagia  Cardiovascular: No chest pain, palpitations, or LE edema  Respiratory: +Residual nonproductive cough. No wheezing, hemoptysis, or shortness of breath.  Gastrointestinal: No abdominal pain, nausea/vomiting, diarrhea/constipation, hematemesis, melena, or BRBPR  Genitourinary: No dysuria, frequency, urgency, or hematuria  Musculoskeletal: No neck pain or back pain. No joint pain, swelling, or decreased ROM.  Skin: No pruritus, rashes, lesions, or wounds  Neurologic: No syncope, seizures, headache, paresthesias, numbness, or limb weakness  Psychiatric: No depression, anxiety, difficulty concentrating, anhedonia, or lack of energy  Endocrine: No heat/cold intolerance, mood swings, sweats, polydipsia, or polyuria  Hematologic/lymphatic: No purpura, petechia, or prolonged or excessive bleeding after dental extraction / injury  Allergic/Immunologic: No anaphylaxis or allergic response to materials, foods, animals    Positives and pertinent negatives noted and all other systems negative.

## 2023-10-12 NOTE — H&P ADULT - HISTORY OF PRESENT ILLNESS
44 yo man with history of type 2 DM (on insulin), CKD stage 5, HFpEF, HTN, HLD, and recent admission at [place],     44 yo M w PMHx of CKD, DM on insulin presents to ED sent by PMD to r/o TB b/c he was c/o SOB and bloody cough. 44 yo man with history of type 2 DM (on insulin), CKD stage 5 (being worked up for possible transplant), HTN, HLD, moderate mitral regurg, and recent ICU admission at [facility] (10/1-10/5/23) for acute hypoxic respiratory failure requiring BiPAP, possibly 2/2 pulm edema vs. multifocal PNA, presents after being sent in by his nephrologist for a positive Quant Gold that was drawn on Monday. Pt had returned from a 2-week vacation to [facility] [place] ~2 weeks ago, and upon his return, started having shortness of breath, fevers, night sweats, nonproductive cough, and coffee ground emesis. Pt initially presented to [place] ED but was subsequently transferred to the ICU at [facility] after being found to be in acute hypoxic respiratory failure. During his admission at [facility], pt had an indeterminate Quant Gold with AFB smear and culture that have been   44 yo M w PMHx of CKD, DM on insulin presents to ED sent by PMD to r/o TB b/c he was c/o SOB and bloody cough. 44 yo man with history of type 2 DM (on insulin), CKD stage 5 (being worked up for possible transplant), HTN, HLD, moderate mitral regurg, and recent ICU admission at [facility] (10/1-10/5/23) for acute hypoxic respiratory failure requiring BiPAP, possibly 2/2 pulm edema vs. PNA, presents after being sent in by his nephrologist for a positive Quant Gold that was drawn on Monday. Pt had returned from a 2-week vacation to [facility] and [facility] ~2 weeks ago, and upon his return, started having shortness of breath, fevers, night sweats, nonproductive cough, and coffee ground emesis. Pt initially presented to [place] ED but was subsequently transferred to the ICU at [facility] for acute hypoxic respiratory failure. During his admission at [facility], pt had an indeterminate Quant Gold and AFB smear and culture with no growth thus far at 1 week. Pt was aggressively diuresed with Lasix gtt and briefly placed on antibiotics with Zosyn, was also found to have entero/rhinovirus infection. Pt symptomatically improved with diuresis.  44 yo M w PMHx of CKD, DM on insulin presents to ED sent by PMD to r/o TB b/c he was c/o SOB and bloody cough. 44 yo man with history of type 2 DM (on insulin), CKD stage 5 (being worked up for possible transplant), HTN, HLD, moderate mitral regurg, latent TB (treated ~30 years ago), and recent ICU admission at [facility] (10/1-10/5/23) for acute hypoxic respiratory failure requiring BiPAP, possibly 2/2 pulm edema vs. PNA, presents after being sent in by his nephrologist for a positive Quant Gold that was drawn on Monday. Pt had returned from a 2-week vacation to [facility] and [facility] ~2 weeks ago, and upon his return, started having shortness of breath, fevers, night sweats, nonproductive cough, and coffee ground emesis. Pt initially presented to [place] ED but was subsequently transferred to the ICU at [facility] for acute hypoxic respiratory failure. During his admission at [facility], pt had a CXR with b/l patchy opacities (R > L), an indeterminate Quant Gold, and an AFB smear and culture with no growth thus far at 1 week. Pt was aggressively diuresed with Lasix gtt and briefly placed on antibiotics with Zosyn (2 days total), was also found to have entero/rhinovirus infection. Pt symptomatically improved with diuresis, with pt reporting that he now only has a residual nonproductive cough. No additional episodes of shortness of breath, fevers, night sweats, or coffee ground emesis since his discharge from [facility]. Pt also denies any chest pain, hemoptysis, weight loss, palpitations, abdominal pain, nausea, diarrhea, constipation, melena, BRBPR, or urinary symptoms. Pt went for a follow-up appointment with his nephrologist on Monday and had a repeat Quant Gold performed during the visit that resulted positive on Wednesday, so pt was recommended to go to the ED for a CT chest by his nephrologist.    In the ED,  T 98.3-98.5, HR 90, -124/76-79, RR 18, SpO2 100% RA.  CT chest noncontrast: Marked interval improvement in airspace disease compared to chest x-ray of 10/1/2023, with a residual cluster of nodular opacities within the medial right lower lobe.

## 2023-10-12 NOTE — ED ADULT NURSE NOTE - NSICDXPASTSURGICALHX_GEN_ALL_CORE_FT
PAST SURGICAL HISTORY:  History of incision and drainage     No significant past surgical history

## 2023-10-12 NOTE — H&P ADULT - PROBLEM SELECTOR PLAN 8
BPs in acceptable range on admission. Pt on Coreg and torsemide at home.  - C/w home Coreg 6.25 mg BID  - C/w home torsemide 80 mg BID  - Monitor VS q4hrs for now

## 2023-10-12 NOTE — ED PROVIDER NOTE - ATTENDING CONTRIBUTION TO CARE
Naman Terrell DO:  patient seen and evaluated with the resident.  I was present for key portions of the History & Physical, and I agree with the Impression & Plan. 44 yo m pmh CKD, DM on insulin presents to ED sent by PMD to r/o TB , pw Blood-tinged cough.  Of note patient was recently admitted here for multifocal pneumonia in setting of recent return from [place].  Had indeterminate QuantiFERON.  While outpatient he had a positive QuantiFERON.  Sent in here for CT scan.  Denies N/C, worsening symptoms.  Reports chronic symptoms.  Patient is HDS, well-appearing, PE as noted.  Will contact PCP, check labs, likely requires admission.

## 2023-10-12 NOTE — ED ADULT NURSE NOTE - NSFALLUNIVINTERV_ED_ALL_ED
Bed/Stretcher in lowest position, wheels locked, appropriate side rails in place/Call bell, personal items and telephone in reach/Instruct patient to call for assistance before getting out of bed/chair/stretcher/Non-slip footwear applied when patient is off stretcher/[place] to call system/Physically safe environment - no spills, clutter or unnecessary equipment/Purposeful proactive rounding/Room/bathroom lighting operational, light cord in reach

## 2023-10-12 NOTE — H&P ADULT - NSICDXPASTMEDICALHX_GEN_ALL_CORE_FT
PAST MEDICAL HISTORY:  History of diabetic retinopathy     HLD (hyperlipidemia)     HTN (hypertension)     Insulin dependent type 2 diabetes mellitus     Latent tuberculosis     Stage 5 chronic kidney disease not on chronic dialysis

## 2023-10-12 NOTE — H&P ADULT - PROBLEM SELECTOR PLAN 7
Pt on insulin glargine (Semglee) 6 u qHS and Januvia at home. A1c on 10/3/23 was 5.9%.   - Hold Januvia while inpatient  - C/w insulin glargine (Lantus) 5 u qHS for now and adjust dose as necessary  - Start low-dose ISS qAC TID and qHS

## 2023-10-12 NOTE — ED ADULT TRIAGE NOTE - CHIEF COMPLAINT QUOTE
Pt sent by PMD for r/o TB. Positive Quantiferon Gold test. Cough x2 weeks. Bloody sputum on 9/30. Reports night sweats. Resp even and unlabored. Mike BLACKBURN, sick contacts. Returned from [facility] 2 weeks ago.

## 2023-10-13 DIAGNOSIS — E78.5 HYPERLIPIDEMIA, UNSPECIFIED: ICD-10-CM

## 2023-10-13 DIAGNOSIS — I31.39 OTHER PERICARDIAL EFFUSION (NONINFLAMMATORY): ICD-10-CM

## 2023-10-13 DIAGNOSIS — I10 ESSENTIAL (PRIMARY) HYPERTENSION: ICD-10-CM

## 2023-10-13 DIAGNOSIS — R94.31 ABNORMAL ELECTROCARDIOGRAM [ECG] [EKG]: ICD-10-CM

## 2023-10-13 DIAGNOSIS — Z29.9 ENCOUNTER FOR PROPHYLACTIC MEASURES, UNSPECIFIED: ICD-10-CM

## 2023-10-13 DIAGNOSIS — E87.1 HYPO-OSMOLALITY AND HYPONATREMIA: ICD-10-CM

## 2023-10-13 DIAGNOSIS — N18.5 CHRONIC KIDNEY DISEASE, STAGE 5: ICD-10-CM

## 2023-10-13 DIAGNOSIS — E11.9 TYPE 2 DIABETES MELLITUS WITHOUT COMPLICATIONS: ICD-10-CM

## 2023-10-13 DIAGNOSIS — R76.12 NONSPECIFIC REACTION TO CELL MEDIATED IMMUNITY MEASUREMENT OF GAMMA INTERFERON ANTIGEN RESPONSE WITHOUT ACTIVE TUBERCULOSIS: ICD-10-CM

## 2023-10-13 DIAGNOSIS — D72.829 ELEVATED WHITE BLOOD CELL COUNT, UNSPECIFIED: ICD-10-CM

## 2023-10-13 LAB
ALBUMIN SERPL ELPH-MCNC: 3.1 G/DL — LOW (ref 3.3–5)
ALP SERPL-CCNC: 60 U/L — SIGNIFICANT CHANGE UP (ref 40–120)
ALT FLD-CCNC: 27 U/L — SIGNIFICANT CHANGE UP (ref 4–41)
ANION GAP SERPL CALC-SCNC: 13 MMOL/L — SIGNIFICANT CHANGE UP (ref 7–14)
APTT BLD: 31.3 SEC — SIGNIFICANT CHANGE UP (ref 24.5–35.6)
AST SERPL-CCNC: 21 U/L — SIGNIFICANT CHANGE UP (ref 4–40)
BASOPHILS # BLD AUTO: 0.05 K/UL — SIGNIFICANT CHANGE UP (ref 0–0.2)
BASOPHILS NFR BLD AUTO: 0.5 % — SIGNIFICANT CHANGE UP (ref 0–2)
BILIRUB SERPL-MCNC: <0.2 MG/DL — SIGNIFICANT CHANGE UP (ref 0.2–1.2)
BUN SERPL-MCNC: 74 MG/DL — HIGH (ref 7–23)
CALCIUM SERPL-MCNC: 7.7 MG/DL — LOW (ref 8.4–10.5)
CHLORIDE SERPL-SCNC: 101 MMOL/L — SIGNIFICANT CHANGE UP (ref 98–107)
CO2 SERPL-SCNC: 24 MMOL/L — SIGNIFICANT CHANGE UP (ref 22–31)
CREAT SERPL-MCNC: 5.51 MG/DL — HIGH (ref 0.5–1.3)
CRP SERPL-MCNC: 7.6 MG/L — HIGH
EGFR: 12 ML/MIN/1.73M2 — LOW
EOSINOPHIL # BLD AUTO: 0.69 K/UL — HIGH (ref 0–0.5)
EOSINOPHIL NFR BLD AUTO: 6.3 % — HIGH (ref 0–6)
ERYTHROCYTE [SEDIMENTATION RATE] IN BLOOD: 94 MM/HR — HIGH (ref 1–15)
GLUCOSE BLDC GLUCOMTR-MCNC: 118 MG/DL — HIGH (ref 70–99)
GLUCOSE BLDC GLUCOMTR-MCNC: 121 MG/DL — HIGH (ref 70–99)
GLUCOSE BLDC GLUCOMTR-MCNC: 135 MG/DL — HIGH (ref 70–99)
GLUCOSE BLDC GLUCOMTR-MCNC: 141 MG/DL — HIGH (ref 70–99)
GLUCOSE BLDC GLUCOMTR-MCNC: 146 MG/DL — HIGH (ref 70–99)
GLUCOSE BLDC GLUCOMTR-MCNC: 184 MG/DL — HIGH (ref 70–99)
GLUCOSE SERPL-MCNC: 118 MG/DL — HIGH (ref 70–99)
HCT VFR BLD CALC: 24.9 % — LOW (ref 39–50)
HGB BLD-MCNC: 8 G/DL — LOW (ref 13–17)
IANC: 8.2 K/UL — HIGH (ref 1.8–7.4)
IMM GRANULOCYTES NFR BLD AUTO: 0.4 % — SIGNIFICANT CHANGE UP (ref 0–0.9)
INR BLD: 1.11 RATIO — SIGNIFICANT CHANGE UP (ref 0.85–1.18)
LYMPHOCYTES # BLD AUTO: 1.13 K/UL — SIGNIFICANT CHANGE UP (ref 1–3.3)
LYMPHOCYTES # BLD AUTO: 10.3 % — LOW (ref 13–44)
MAGNESIUM SERPL-MCNC: 1.7 MG/DL — SIGNIFICANT CHANGE UP (ref 1.6–2.6)
MCHC RBC-ENTMCNC: 23.7 PG — LOW (ref 27–34)
MCHC RBC-ENTMCNC: 32.1 GM/DL — SIGNIFICANT CHANGE UP (ref 32–36)
MCV RBC AUTO: 73.7 FL — LOW (ref 80–100)
MONOCYTES # BLD AUTO: 0.89 K/UL — SIGNIFICANT CHANGE UP (ref 0–0.9)
MONOCYTES NFR BLD AUTO: 8.1 % — SIGNIFICANT CHANGE UP (ref 2–14)
NEUTROPHILS # BLD AUTO: 8.2 K/UL — HIGH (ref 1.8–7.4)
NEUTROPHILS NFR BLD AUTO: 74.4 % — SIGNIFICANT CHANGE UP (ref 43–77)
NRBC # BLD: 0 /100 WBCS — SIGNIFICANT CHANGE UP (ref 0–0)
NRBC # FLD: 0 K/UL — SIGNIFICANT CHANGE UP (ref 0–0)
PHOSPHATE SERPL-MCNC: 5.5 MG/DL — HIGH (ref 2.5–4.5)
PLATELET # BLD AUTO: 310 K/UL — SIGNIFICANT CHANGE UP (ref 150–400)
POTASSIUM SERPL-MCNC: 3.4 MMOL/L — LOW (ref 3.5–5.3)
POTASSIUM SERPL-SCNC: 3.4 MMOL/L — LOW (ref 3.5–5.3)
PROT SERPL-MCNC: 6.4 G/DL — SIGNIFICANT CHANGE UP (ref 6–8.3)
PROTHROM AB SERPL-ACNC: 12.5 SEC — SIGNIFICANT CHANGE UP (ref 9.5–13)
RBC # BLD: 3.38 M/UL — LOW (ref 4.2–5.8)
RBC # FLD: 15.3 % — HIGH (ref 10.3–14.5)
SODIUM SERPL-SCNC: 138 MMOL/L — SIGNIFICANT CHANGE UP (ref 135–145)
TROPONIN T, HIGH SENSITIVITY RESULT: 263 NG/L — CRITICAL HIGH
WBC # BLD: 11 K/UL — HIGH (ref 3.8–10.5)
WBC # FLD AUTO: 11 K/UL — HIGH (ref 3.8–10.5)

## 2023-10-13 PROCEDURE — 99232 SBSQ HOSP IP/OBS MODERATE 35: CPT

## 2023-10-13 PROCEDURE — 93306 TTE W/DOPPLER COMPLETE: CPT | Mod: 26

## 2023-10-13 PROCEDURE — 93010 ELECTROCARDIOGRAM REPORT: CPT

## 2023-10-13 RX ORDER — INSULIN GLARGINE 100 [IU]/ML
5 INJECTION, SOLUTION SUBCUTANEOUS ONCE
Refills: 0 | Status: COMPLETED | OUTPATIENT
Start: 2023-10-13 | End: 2023-10-13

## 2023-10-13 RX ORDER — GLUCAGON INJECTION, SOLUTION 0.5 MG/.1ML
1 INJECTION, SOLUTION SUBCUTANEOUS ONCE
Refills: 0 | Status: DISCONTINUED | OUTPATIENT
Start: 2023-10-13 | End: 2023-10-15

## 2023-10-13 RX ORDER — INSULIN GLARGINE 100 [IU]/ML
5 INJECTION, SOLUTION SUBCUTANEOUS AT BEDTIME
Refills: 0 | Status: DISCONTINUED | OUTPATIENT
Start: 2023-10-13 | End: 2023-10-15

## 2023-10-13 RX ORDER — SODIUM BICARBONATE 1 MEQ/ML
650 SYRINGE (ML) INTRAVENOUS
Refills: 0 | Status: DISCONTINUED | OUTPATIENT
Start: 2023-10-13 | End: 2023-10-15

## 2023-10-13 RX ORDER — SODIUM CHLORIDE 9 MG/ML
1000 INJECTION, SOLUTION INTRAVENOUS
Refills: 0 | Status: DISCONTINUED | OUTPATIENT
Start: 2023-10-13 | End: 2023-10-15

## 2023-10-13 RX ORDER — DEXTROSE 50 % IN WATER 50 %
12.5 SYRINGE (ML) INTRAVENOUS ONCE
Refills: 0 | Status: DISCONTINUED | OUTPATIENT
Start: 2023-10-13 | End: 2023-10-15

## 2023-10-13 RX ORDER — DEXTROSE 50 % IN WATER 50 %
15 SYRINGE (ML) INTRAVENOUS ONCE
Refills: 0 | Status: DISCONTINUED | OUTPATIENT
Start: 2023-10-13 | End: 2023-10-15

## 2023-10-13 RX ORDER — ATORVASTATIN CALCIUM 80 MG/1
40 TABLET, FILM COATED ORAL AT BEDTIME
Refills: 0 | Status: DISCONTINUED | OUTPATIENT
Start: 2023-10-13 | End: 2023-10-15

## 2023-10-13 RX ORDER — INSULIN LISPRO 100/ML
VIAL (ML) SUBCUTANEOUS AT BEDTIME
Refills: 0 | Status: DISCONTINUED | OUTPATIENT
Start: 2023-10-13 | End: 2023-10-15

## 2023-10-13 RX ORDER — HEPARIN SODIUM 5000 [USP'U]/ML
5000 INJECTION INTRAVENOUS; SUBCUTANEOUS EVERY 8 HOURS
Refills: 0 | Status: DISCONTINUED | OUTPATIENT
Start: 2023-10-13 | End: 2023-10-15

## 2023-10-13 RX ORDER — CALCIUM ACETATE 667 MG
1334 TABLET ORAL
Refills: 0 | Status: DISCONTINUED | OUTPATIENT
Start: 2023-10-13 | End: 2023-10-15

## 2023-10-13 RX ORDER — CARVEDILOL PHOSPHATE 80 MG/1
6.25 CAPSULE, EXTENDED RELEASE ORAL EVERY 12 HOURS
Refills: 0 | Status: DISCONTINUED | OUTPATIENT
Start: 2023-10-13 | End: 2023-10-15

## 2023-10-13 RX ORDER — INSULIN LISPRO 100/ML
VIAL (ML) SUBCUTANEOUS
Refills: 0 | Status: DISCONTINUED | OUTPATIENT
Start: 2023-10-13 | End: 2023-10-15

## 2023-10-13 RX ORDER — SODIUM CHLORIDE 9 MG/ML
4 INJECTION INTRAMUSCULAR; INTRAVENOUS; SUBCUTANEOUS EVERY 8 HOURS
Refills: 0 | Status: COMPLETED | OUTPATIENT
Start: 2023-10-13 | End: 2023-10-14

## 2023-10-13 RX ORDER — DEXTROSE 50 % IN WATER 50 %
25 SYRINGE (ML) INTRAVENOUS ONCE
Refills: 0 | Status: DISCONTINUED | OUTPATIENT
Start: 2023-10-13 | End: 2023-10-15

## 2023-10-13 RX ORDER — ROSUVASTATIN CALCIUM 5 MG/1
1 TABLET ORAL
Refills: 0 | DISCHARGE

## 2023-10-13 RX ADMIN — HEPARIN SODIUM 5000 UNIT(S): 5000 INJECTION INTRAVENOUS; SUBCUTANEOUS at 21:32

## 2023-10-13 RX ADMIN — Medication 1334 MILLIGRAM(S): at 09:28

## 2023-10-13 RX ADMIN — CARVEDILOL PHOSPHATE 6.25 MILLIGRAM(S): 80 CAPSULE, EXTENDED RELEASE ORAL at 09:59

## 2023-10-13 RX ADMIN — Medication 650 MILLIGRAM(S): at 06:12

## 2023-10-13 RX ADMIN — Medication 1334 MILLIGRAM(S): at 13:34

## 2023-10-13 RX ADMIN — Medication 80 MILLIGRAM(S): at 01:37

## 2023-10-13 RX ADMIN — CARVEDILOL PHOSPHATE 6.25 MILLIGRAM(S): 80 CAPSULE, EXTENDED RELEASE ORAL at 01:37

## 2023-10-13 RX ADMIN — Medication 1334 MILLIGRAM(S): at 17:21

## 2023-10-13 RX ADMIN — ATORVASTATIN CALCIUM 40 MILLIGRAM(S): 80 TABLET, FILM COATED ORAL at 21:32

## 2023-10-13 RX ADMIN — INSULIN GLARGINE 5 UNIT(S): 100 INJECTION, SOLUTION SUBCUTANEOUS at 21:31

## 2023-10-13 RX ADMIN — SODIUM CHLORIDE 4 MILLILITER(S): 9 INJECTION INTRAMUSCULAR; INTRAVENOUS; SUBCUTANEOUS at 16:02

## 2023-10-13 RX ADMIN — INSULIN GLARGINE 5 UNIT(S): 100 INJECTION, SOLUTION SUBCUTANEOUS at 01:43

## 2023-10-13 RX ADMIN — HEPARIN SODIUM 5000 UNIT(S): 5000 INJECTION INTRAVENOUS; SUBCUTANEOUS at 13:34

## 2023-10-13 RX ADMIN — SODIUM CHLORIDE 4 MILLILITER(S): 9 INJECTION INTRAMUSCULAR; INTRAVENOUS; SUBCUTANEOUS at 23:32

## 2023-10-13 RX ADMIN — Medication 80 MILLIGRAM(S): at 13:33

## 2023-10-13 RX ADMIN — CARVEDILOL PHOSPHATE 6.25 MILLIGRAM(S): 80 CAPSULE, EXTENDED RELEASE ORAL at 18:28

## 2023-10-13 RX ADMIN — Medication 650 MILLIGRAM(S): at 18:27

## 2023-10-13 NOTE — PROGRESS NOTE ADULT - PROBLEM SELECTOR PLAN 6
Pt on insulin glargine (Semglee) 6u qHS and Januvia at home. A1c on 10/3/23 was 5.9%.   - Hold Januvia while inpatient  - C/w insulin glargine (Lantus) 5u qHS for now and adjust dose as necessary  - Start low-dose ISS qAC TID and qHS

## 2023-10-14 LAB
DEPRECATED M TB RPOB XXX QL NAA+PROBE: SIGNIFICANT CHANGE UP
GLUCOSE BLDC GLUCOMTR-MCNC: 111 MG/DL — HIGH (ref 70–99)
GLUCOSE BLDC GLUCOMTR-MCNC: 116 MG/DL — HIGH (ref 70–99)
GLUCOSE BLDC GLUCOMTR-MCNC: 132 MG/DL — HIGH (ref 70–99)
GLUCOSE BLDC GLUCOMTR-MCNC: 142 MG/DL — HIGH (ref 70–99)
M TB CMPLX DNA SPT/BRO QL NAA+PROBE: SIGNIFICANT CHANGE UP
M TB CMPLX DNA SPT/BRO QL NAA+PROBE: SIGNIFICANT CHANGE UP
MTB RIF RES GENE SPT NAA+PROBE: SIGNIFICANT CHANGE UP
NIGHT BLUE STAIN TISS: SIGNIFICANT CHANGE UP
SPECIMEN SOURCE: SIGNIFICANT CHANGE UP
TROPONIN T, HIGH SENSITIVITY RESULT: 261 NG/L — CRITICAL HIGH

## 2023-10-14 PROCEDURE — 99232 SBSQ HOSP IP/OBS MODERATE 35: CPT

## 2023-10-14 RX ORDER — ACETAMINOPHEN 500 MG
650 TABLET ORAL EVERY 6 HOURS
Refills: 0 | Status: DISCONTINUED | OUTPATIENT
Start: 2023-10-14 | End: 2023-10-15

## 2023-10-14 RX ADMIN — CARVEDILOL PHOSPHATE 6.25 MILLIGRAM(S): 80 CAPSULE, EXTENDED RELEASE ORAL at 17:44

## 2023-10-14 RX ADMIN — ATORVASTATIN CALCIUM 40 MILLIGRAM(S): 80 TABLET, FILM COATED ORAL at 21:53

## 2023-10-14 RX ADMIN — INSULIN GLARGINE 5 UNIT(S): 100 INJECTION, SOLUTION SUBCUTANEOUS at 21:53

## 2023-10-14 RX ADMIN — Medication 1334 MILLIGRAM(S): at 13:23

## 2023-10-14 RX ADMIN — Medication 1334 MILLIGRAM(S): at 17:44

## 2023-10-14 RX ADMIN — Medication 80 MILLIGRAM(S): at 13:23

## 2023-10-14 RX ADMIN — Medication 80 MILLIGRAM(S): at 00:38

## 2023-10-14 RX ADMIN — HEPARIN SODIUM 5000 UNIT(S): 5000 INJECTION INTRAVENOUS; SUBCUTANEOUS at 05:01

## 2023-10-14 RX ADMIN — Medication 650 MILLIGRAM(S): at 05:01

## 2023-10-14 RX ADMIN — Medication 650 MILLIGRAM(S): at 17:44

## 2023-10-14 RX ADMIN — CARVEDILOL PHOSPHATE 6.25 MILLIGRAM(S): 80 CAPSULE, EXTENDED RELEASE ORAL at 05:01

## 2023-10-14 RX ADMIN — HEPARIN SODIUM 5000 UNIT(S): 5000 INJECTION INTRAVENOUS; SUBCUTANEOUS at 13:23

## 2023-10-14 RX ADMIN — SODIUM CHLORIDE 4 MILLILITER(S): 9 INJECTION INTRAMUSCULAR; INTRAVENOUS; SUBCUTANEOUS at 07:07

## 2023-10-14 RX ADMIN — Medication 1334 MILLIGRAM(S): at 09:43

## 2023-10-14 RX ADMIN — HEPARIN SODIUM 5000 UNIT(S): 5000 INJECTION INTRAVENOUS; SUBCUTANEOUS at 21:53

## 2023-10-14 NOTE — PROGRESS NOTE ADULT - SUBJECTIVE AND OBJECTIVE BOX
Infectious Diseases Follow Up:    Patient is a 43y old  Male who presents with a chief complaint of R/o active TB (13 Oct 2023 13:57)      Interval History/ROS:  No acute changes. Pt denies F/C/hemoptysis/SOB/night sweats   Has mild cough, improved overall       Allergies  No Known Allergies        ANTIMICROBIALS:      Current Abx:     Previous Abx     OTHER MEDS:  MEDICATIONS  (STANDING):  atorvastatin 40 at bedtime  carvedilol 6.25 every 12 hours  dextrose 50% Injectable 25 once  dextrose 50% Injectable 12.5 once  dextrose 50% Injectable 25 once  dextrose Oral Gel 15 once PRN  glucagon  Injectable 1 once  heparin   Injectable 5000 every 8 hours  influenza   Vaccine 0.5 once  insulin glargine Injectable (LANTUS) 5 at bedtime  insulin lispro (ADMELOG) corrective regimen sliding scale  at bedtime  insulin lispro (ADMELOG) corrective regimen sliding scale  three times a day before meals  sodium chloride 3%  Inhalation 4 every 8 hours  torsemide 80 every 12 hours      Vital Signs Last 24 Hrs  T(C): 36.6 (13 Oct 2023 13:54), Max: 36.9 (12 Oct 2023 21:11)  T(F): 97.9 (13 Oct 2023 13:54), Max: 98.4 (12 Oct 2023 21:11)  HR: 88 (13 Oct 2023 16:06) (85 - 100)  BP: 107/71 (13 Oct 2023 13:54) (107/71 - 146/90)  BP(mean): --  RR: 18 (13 Oct 2023 13:54) (18 - 18)  SpO2: 99% (13 Oct 2023 16:06) (97% - 99%)    Parameters below as of 13 Oct 2023 16:06  Patient On (Oxygen Delivery Method): room air        PHYSICAL EXAM:  GENERAL: NAD, well-developed  HEAD:  Atraumatic, Normocephalic  EYES: EOMI, PERRLA, conjunctiva and sclera clear  NECK: Supple, No JVD  CHEST/LUNG: Clear to auscultation bilaterally; No wheeze  HEART: Regular rate and rhythm; No murmurs, rubs, or gallops  ABDOMEN: Soft, Nontender, Nondistended; Bowel sounds present  EXTREMITIES:  2+ Peripheral Pulses, No clubbing, cyanosis, or edema  PSYCH: AAOx3  NEUROLOGY: non-focal  SKIN: No rashes or lesions                          8.0    11.00 )-----------( 310      ( 13 Oct 2023 07:30 )             24.9       10-13    138  |  101  |  74<H>  ----------------------------<  118<H>  3.4<L>   |  24  |  5.51<H>    Ca    7.7<L>      13 Oct 2023 07:30  Phos  5.5     10-13  Mg     1.70     10-13    TPro  6.4  /  Alb  3.1<L>  /  TBili  <0.2  /  DBili  x   /  AST  21  /  ALT  27  /  AlkPhos  60  10-13      Urinalysis Basic - ( 13 Oct 2023 07:30 )    Color: x / Appearance: x / SG: x / pH: x  Gluc: 118 mg/dL / Ketone: x  / Bili: x / Urobili: x   Blood: x / Protein: x / Nitrite: x   Leuk Esterase: x / RBC: x / WBC x   Sq Epi: x / Non Sq Epi: x / Bacteria: x        MICROBIOLOGY:  v  .Sputum Sputum  10-02-23   No acid-fast bacilli isolated at 1 week. ****Acid-fast cultures are held  for 6 weeks.****  --  --      Catheterized Catheterized  10-01-23   No growth  --  --      .Blood Blood  10-01-23   No growth at 5 days  --  --          Rapid RVP Result: NotDetec (10-12 @ 14:05)        RADIOLOGY:  < from: CT Chest No Cont (10.12.23 @ 19:04) >  IMPRESSION:  1.  Marked interval improvement in airspace disease compared to chest   x-ray 10/1/2023, with a nonspecific residual cluster of nodular opacities   within the medial right lower lobe.  2.  Small calcified mediastinal lymph nodes suggest prior granulomatous   infection.  3.  Cardiomegaly with small pericardial effusion.      
[facility] Division of Hospital Medicine  Amina Brewer MD  Available on [employer] TEAMS    SUBJECTIVE / OVERNIGHT EVENTS: Patient seen and examined. Denies chest pain, SOB or worsening cough. Discussed need for negative sputum AFB for discharge home      MEDICATIONS  (STANDING):  atorvastatin 40 milliGRAM(s) Oral at bedtime  calcium acetate 1334 milliGRAM(s) Oral three times a day with meals  carvedilol 6.25 milliGRAM(s) Oral every 12 hours  dextrose 5%. 1000 milliLiter(s) (50 mL/Hr) IV Continuous <Continuous>  dextrose 5%. 1000 milliLiter(s) (100 mL/Hr) IV Continuous <Continuous>  dextrose 50% Injectable 25 Gram(s) IV Push once  dextrose 50% Injectable 12.5 Gram(s) IV Push once  dextrose 50% Injectable 25 Gram(s) IV Push once  glucagon  Injectable 1 milliGRAM(s) IntraMuscular once  heparin   Injectable 5000 Unit(s) SubCutaneous every 8 hours  influenza   Vaccine 0.5 milliLiter(s) IntraMuscular once  insulin glargine Injectable (LANTUS) 5 Unit(s) SubCutaneous at bedtime  insulin lispro (ADMELOG) corrective regimen sliding scale   SubCutaneous at bedtime  insulin lispro (ADMELOG) corrective regimen sliding scale   SubCutaneous three times a day before meals  sodium bicarbonate 650 milliGRAM(s) Oral two times a day  torsemide 80 milliGRAM(s) Oral every 12 hours    MEDICATIONS  (PRN):  acetaminophen     Tablet .. 650 milliGRAM(s) Oral every 6 hours PRN Temp greater or equal to 38C (100.4F), Mild Pain (1 - 3), Moderate Pain (4 - 6)  dextrose Oral Gel 15 Gram(s) Oral once PRN Blood Glucose LESS THAN 70 milliGRAM(s)/deciliter      I&O's Summary    13 Oct 2023 07:01  -  14 Oct 2023 07:00  --------------------------------------------------------  IN: 1300 mL / OUT: 950 mL / NET: 350 mL        PHYSICAL EXAM:  Vital Signs Last 24 Hrs  T(C): 36.7 (14 Oct 2023 13:30), Max: 36.9 (14 Oct 2023 05:00)  T(F): 98 (14 Oct 2023 13:30), Max: 98.4 (14 Oct 2023 05:00)  HR: 94 (14 Oct 2023 13:30) (86 - 94)  BP: 134/85 (14 Oct 2023 13:30) (110/88 - 134/85)  BP(mean): --  RR: 16 (14 Oct 2023 13:30) (16 - 18)  SpO2: 97% (14 Oct 2023 13:30) (97% - 100%)    Parameters below as of 14 Oct 2023 07:34  Patient On (Oxygen Delivery Method): room air      CONSTITUTIONAL: NAD, well-developed, well-groomed  RESPIRATORY: Normal respiratory effort; lungs are clear to auscultation bilaterally; No wheezes or rales  CARDIOVASCULAR: Regular rate and rhythm; Normal S1 and S2; No murmurs, rubs, or gallops; No lower extremity edema  ABDOMEN: Soft, Nontender, Nondistended; Bowel sounds present  MUSCULOSKELETAL:  No clubbing or cyanosis of digits; No joint swelling or tenderness to palpation  PSYCH: AAOx3 (oriented to person, place, and time); affect appropriate  NEUROLOGY: No focal deficits   SKIN: No rashes; No palpable lesions    LABS:                        8.0    11.00 )-----------( 310      ( 13 Oct 2023 07:30 )             24.9     10-13    138  |  101  |  74<H>  ----------------------------<  118<H>  3.4<L>   |  24  |  5.51<H>    Ca    7.7<L>      13 Oct 2023 07:30  Phos  5.5     10-13  Mg     1.70     10-13    TPro  6.4  /  Alb  3.1<L>  /  TBili  <0.2  /  DBili  x   /  AST  21  /  ALT  27  /  AlkPhos  60  10-13    PT/INR - ( 13 Oct 2023 07:30 )   PT: 12.5 sec;   INR: 1.11 ratio         PTT - ( 13 Oct 2023 07:30 )  PTT:31.3 sec      Urinalysis Basic - ( 13 Oct 2023 07:30 )    Color: x / Appearance: x / SG: x / pH: x  Gluc: 118 mg/dL / Ketone: x  / Bili: x / Urobili: x   Blood: x / Protein: x / Nitrite: x   Leuk Esterase: x / RBC: x / WBC x   Sq Epi: x / Non Sq Epi: x / Bacteria: x        SARS-CoV-2: NotDetec (12 Oct 2023 14:05)  SARS-CoV-2: NotDetec (01 Oct 2023 22:45)    
[facility] Division of Hospital Medicine  Amina Brewer MD  Available on [employer] TEAMS    SUBJECTIVE / OVERNIGHT EVENTS: Patient seen and examined. Reports that he overall feels fine, denies fevers, chills, worsening productive cough or night sweats recently, states he had all this prior to his previous hospital stay. States he is not able to bring up much sputum at all. Discussed will try to give him something to help with sputum induction.       MEDICATIONS  (STANDING):  atorvastatin 40 milliGRAM(s) Oral at bedtime  calcium acetate 1334 milliGRAM(s) Oral three times a day with meals  carvedilol 6.25 milliGRAM(s) Oral every 12 hours  dextrose 5%. 1000 milliLiter(s) (50 mL/Hr) IV Continuous <Continuous>  dextrose 5%. 1000 milliLiter(s) (100 mL/Hr) IV Continuous <Continuous>  dextrose 50% Injectable 12.5 Gram(s) IV Push once  dextrose 50% Injectable 25 Gram(s) IV Push once  dextrose 50% Injectable 25 Gram(s) IV Push once  glucagon  Injectable 1 milliGRAM(s) IntraMuscular once  heparin   Injectable 5000 Unit(s) SubCutaneous every 8 hours  influenza   Vaccine 0.5 milliLiter(s) IntraMuscular once  insulin glargine Injectable (LANTUS) 5 Unit(s) SubCutaneous at bedtime  insulin lispro (ADMELOG) corrective regimen sliding scale   SubCutaneous at bedtime  insulin lispro (ADMELOG) corrective regimen sliding scale   SubCutaneous three times a day before meals  sodium bicarbonate 650 milliGRAM(s) Oral two times a day  sodium chloride 3%  Inhalation 4 milliLiter(s) Inhalation every 8 hours  torsemide 80 milliGRAM(s) Oral every 12 hours    MEDICATIONS  (PRN):  dextrose Oral Gel 15 Gram(s) Oral once PRN Blood Glucose LESS THAN 70 milliGRAM(s)/deciliter      I&O's Summary    12 Oct 2023 07:01  -  13 Oct 2023 07:00  --------------------------------------------------------  IN: 360 mL / OUT: 0 mL / NET: 360 mL        PHYSICAL EXAM:  Vital Signs Last 24 Hrs  T(C): 36.8 (13 Oct 2023 09:06), Max: 36.9 (12 Oct 2023 17:14)  T(F): 98.2 (13 Oct 2023 09:06), Max: 98.5 (12 Oct 2023 17:14)  HR: 93 (13 Oct 2023 09:06) (85 - 100)  BP: 115/71 (13 Oct 2023 09:06) (115/71 - 146/90)  BP(mean): --  RR: 18 (13 Oct 2023 09:06) (18 - 18)  SpO2: 97% (13 Oct 2023 09:06) (97% - 100%)    Parameters below as of 13 Oct 2023 09:06  Patient On (Oxygen Delivery Method): room air      CONSTITUTIONAL: NAD, well-developed, well-groomed  EYES: Conjunctiva and sclera clear  ENMT: Moist oral mucosa   RESPIRATORY: Normal respiratory effort; lungs are clear to auscultation bilaterally; No wheezes or rales  CARDIOVASCULAR: Regular rate and rhythm; Normal S1 and S2; No murmurs, rubs, or gallops; No lower extremity edema  ABDOMEN: Soft, Nontender, Nondistended; Bowel sounds present  MUSCULOSKELETAL:  No clubbing or cyanosis of digits; No joint swelling or tenderness to palpation  PSYCH: AAOx3 (oriented to person, place, and time); affect appropriate  NEUROLOGY: No focal deficits   SKIN: No rashes; No palpable lesions    LABS:                        8.0    11.00 )-----------( 310      ( 13 Oct 2023 07:30 )             24.9     10-13    138  |  101  |  74<H>  ----------------------------<  118<H>  3.4<L>   |  24  |  5.51<H>    Ca    7.7<L>      13 Oct 2023 07:30  Phos  5.5     10-13  Mg     1.70     10-13    TPro  6.4  /  Alb  3.1<L>  /  TBili  <0.2  /  DBili  x   /  AST  21  /  ALT  27  /  AlkPhos  60  10-13    PT/INR - ( 13 Oct 2023 07:30 )   PT: 12.5 sec;   INR: 1.11 ratio         PTT - ( 13 Oct 2023 07:30 )  PTT:31.3 sec      Urinalysis Basic - ( 13 Oct 2023 07:30 )    Color: x / Appearance: x / SG: x / pH: x  Gluc: 118 mg/dL / Ketone: x  / Bili: x / Urobili: x   Blood: x / Protein: x / Nitrite: x   Leuk Esterase: x / RBC: x / WBC x   Sq Epi: x / Non Sq Epi: x / Bacteria: x        SARS-CoV-2: NotDetec (12 Oct 2023 14:05)  SARS-CoV-2: NotDetec (01 Oct 2023 22:45)      RADIOLOGY & ADDITIONAL TESTS:  New Results Reviewed Today:   New Imaging Personally Reviewed Today:  New Electrocardiogram Personally Reviewed Today:  Prior or Outpatient Records Reviewed Today:    COMMUNICATION:  Care Discussed with Consultants/Other Providers and Details of Discussion:  Discussions with Patient/Family:  PCP Communication:

## 2023-10-14 NOTE — PROGRESS NOTE ADULT - ASSESSMENT
43M with history of type 2 DM (on insulin), CKD stage 5 (being worked up for possible transplant), HTN, HLD, moderate mitral regurg, latent TB (treated ~30 years ago), and recent ICU admission at [place] (10/1-10/5/23) for acute hypoxic respiratory failure requiring BiPAP, possibly 2/2 pulm edema vs. PNA, presents after being sent in by his nephrologist for a positive Quant Gold that was drawn on Monday, admitted for r/o active TB.
43 M T2DM and CKD presents today to r/o TB. Pt with h/o latent TB, treated 30 years ago for several months.     Work up:  Positive QuantiFeron gold  Chest xray abnormal 10/2/2023  1 Sputum for AFB has negative smear and negative culture at 1 week from 10/2/2023    CT Chest  Marked interval improvement in airspace disease compared to chest   x-ray 10/1/2023, with a nonspecific residual cluster of nodular opacities   within the medial right lower lobe.  Small calcified mediastinal lymph nodes suggest prior granulomatous   infection.    Overall  Pt with positive QuantiFeron gold, indicative of prior exposure to TB.  Not necessarily indicative of active TB. Pt w/ h/o treated latent TB  No clear findings of TB b/c pt had reason for abnormal xray with viral infection last admission and AFB sputum is negative to date with negative smear.  CT with less c/f for active TB    Plan  1) Would get 2 more sputums for AFB and MTB PCR (ordered), If negative, can d/c isolation   2) WBC improved     Thank you for this consult. Inpatient ID team will follow.    Danny Atkinson M.D.  Attending Physician  Division of Infectious Diseases  Department of Medicine    Please contact through MS Teams message.  Office: [phone] (after 5 PM or weekend)          
43M with history of type 2 DM (on insulin), CKD stage 5 (being worked up for possible transplant), HTN, HLD, moderate mitral regurg, latent TB (treated ~30 years ago), and recent ICU admission at [place] (10/1-10/5/23) for acute hypoxic respiratory failure requiring BiPAP, possibly 2/2 pulm edema vs. PNA, presents after being sent in by his nephrologist for a positive Quant Gold that was drawn on Monday, admitted for r/o active TB.

## 2023-10-14 NOTE — PROGRESS NOTE ADULT - PROBLEM SELECTOR PLAN 2
CT chest noncontrast showing small pericardial effusion. TTE on 10/2/23 demonstrating trace pericardial effusion. Can be from volume overload or from uremia given CKD. Given recent viral infection with entero/rhinovirus, pericardial effusion can be in setting of viral pericarditis. Lower suspicion for TB pericarditis. However, pt with no chest pain or shortness of breath and EKG with no diffuse LEXII.  - Repeat TTE ordered   - hs-trop noted to be elevated. Patient has no active chest pain, suspect this is elevated in setting of CKD. Will trend trop in AM and if stable no further work-up
CT chest noncontrast showing small pericardial effusion. TTE on 10/2/23 demonstrating trace pericardial effusion. Can be from volume overload or from uremia given CKD. Given recent viral infection with entero/rhinovirus, pericardial effusion can be in setting of viral pericarditis. Lower suspicion for TB pericarditis. However, pt with no chest pain or shortness of breath and EKG with no diffuse LEXII.  - TTE reviewed, trace pericardial effusion, normal LVSF   - hs-trop noted to be elevated but delta is stable. Patient has NO active chest pain, suspect this is elevated in setting of CKD.

## 2023-10-14 NOTE — PROGRESS NOTE ADULT - PROBLEM SELECTOR PLAN 5
SCr 5.46 on admission, appears to be at baseline.   - Monitor SCr and urine output  - Avoid NSAIDs, contrast, and nephrotoxic agents  - Check serum Ca and Phos  - C/w home torsemide 80 mg BID  - C/w calcium acetate 1667 mg TID   - C/w sodium bicarb 650 mg BID  - Fluid restrict to 1200 cc/day  - Renally dose meds
SCr 5.46 on admission, appears to be at baseline.   - Monitor SCr and urine output  - Avoid NSAIDs, contrast, and nephrotoxic agents  - Check serum Ca and Phos  - C/w home torsemide 80 mg BID  - C/w calcium acetate 1667 mg TID   - C/w sodium bicarb 650 mg BID  - Fluid restrict to 1200 cc/day  - Renally dose meds

## 2023-10-14 NOTE — PROGRESS NOTE ADULT - PROBLEM SELECTOR PLAN 3
WBC 15.24 on admission --> 11.   - Pt afebrile with no localizing S/S of infection.   - Trend CBC with diff  - Monitor off antibiotics for now
WBC 15.24 on admission --> 11.   - Pt afebrile with no localizing S/S of infection.   - Trend CBC with diff  - Monitor off antibiotics for now

## 2023-10-14 NOTE — PROGRESS NOTE ADULT - PROBLEM SELECTOR PLAN 9
- DVT ppx: HSQ  - Diet: Renal restrictions  - Dispo: Home pending clinical course
- DVT ppx: HSQ  - Diet: Renal restrictions  - Dispo: Home pending negative AFB

## 2023-10-14 NOTE — PROVIDER CONTACT NOTE (OTHER) - ASSESSMENT
AOX4, VSS,afebrile, (+) dry non-productive cough, denies SOB or difficulty breathing, no use of accessory muscle, denies chest pain or discomfort.

## 2023-10-14 NOTE — PROVIDER CONTACT NOTE (OTHER) - SITUATION
Pt. w/ dry non-productive cough, unable to provide sample for sputum for ordered test but will try/ [id number]

## 2023-10-14 NOTE — PROGRESS NOTE ADULT - PROBLEM SELECTOR PLAN 1
Pt with an indeterminate Quant Gold result on 10/2/23 after presenting to an OSH on 10/1/23 with shortness of breath, fevers, night sweats, nonproductive cough, and coffee ground emesis following a 2-week vacation to [facility]. Pt with a positive Quant Gold result from Monday as outpatient. Pt currently only with nonproductive cough, no other symptoms. Pt with history of latent TB, treated ~30 years ago. Positive Quant Gold likely indicative of prior exposure to TB, not necessarily indicative of active TB.   - ID consulted on admission, appreciate recs  - CT chest noncontrast showing marked interval improvement in airspace disease compared to chest Xray of 10/1/2023, with a residual cluster of nodular opacities within the medial right lower lobe  - Per ID, obtain 2 more sputums for AFB, and if 2 AFB smears/cultures are negative, then can discontinue TB for isolation, as pt with negative CT chest. Collected and in lab, will follow-up   - For now, keep airborne isolation in place
Pt with an indeterminate Quant Gold result on 10/2/23 after presenting to an OSH on 10/1/23 with shortness of breath, fevers, night sweats, nonproductive cough, and coffee ground emesis following a 2-week vacation to [facility]. Pt with a positive Quant Gold result from Monday as outpatient. Pt currently only with nonproductive cough, no other symptoms. Pt with history of latent TB, treated ~30 years ago. Positive Quant Gold likely indicative of prior exposure to TB, not necessarily indicative of active TB.   - ID consulted on admission, appreciate recs  - CT chest noncontrast showing marked interval improvement in airspace disease compared to chest Xray of 10/1/2023, with a residual cluster of nodular opacities within the medial right lower lobe  - Per ID, obtain 2 more sputums for AFB, and if 2 AFB smears/cultures are negative, then can discontinue TB for isolation, as pt with negative CT chest. Will add hypertonic saline to help with sputum induction.   - For now, keep airborne isolation in place

## 2023-10-14 NOTE — PROGRESS NOTE ADULT - PROBLEM SELECTOR PLAN 4
QTc prolonged at 490 ms on admission EKG.   - Monitor QTc with serial EKGs  - Avoid QT prolonging agents  - Monitor electrolytes and keep serum K >4, Mg >2
QTc prolonged at 490 ms on admission EKG.   - Monitor QTc with serial EKGs  - Avoid QT prolonging agents  - Monitor electrolytes and keep serum K >4, Mg >2

## 2023-10-14 NOTE — PROGRESS NOTE ADULT - PROBLEM SELECTOR PLAN 8
- C/w atorvastatin 40 mg qHS (therapeutic interchange for rosuvastatin 10 mg qHS)
- C/w atorvastatin 40 mg qHS (therapeutic interchange for rosuvastatin 10 mg qHS)

## 2023-10-15 ENCOUNTER — TRANSCRIPTION ENCOUNTER (OUTPATIENT)
Age: 43
End: 2023-10-15

## 2023-10-15 VITALS
SYSTOLIC BLOOD PRESSURE: 100 MMHG | TEMPERATURE: 98 F | DIASTOLIC BLOOD PRESSURE: 66 MMHG | HEART RATE: 92 BPM | RESPIRATION RATE: 18 BRPM | OXYGEN SATURATION: 100 %

## 2023-10-15 LAB
ALBUMIN SERPL ELPH-MCNC: 3 G/DL — LOW (ref 3.3–5)
ALP SERPL-CCNC: 60 U/L — SIGNIFICANT CHANGE UP (ref 40–120)
ALT FLD-CCNC: 22 U/L — SIGNIFICANT CHANGE UP (ref 4–41)
ANION GAP SERPL CALC-SCNC: 18 MMOL/L — HIGH (ref 7–14)
AST SERPL-CCNC: 22 U/L — SIGNIFICANT CHANGE UP (ref 4–40)
BASOPHILS # BLD AUTO: 0.09 K/UL — SIGNIFICANT CHANGE UP (ref 0–0.2)
BASOPHILS NFR BLD AUTO: 0.9 % — SIGNIFICANT CHANGE UP (ref 0–2)
BILIRUB SERPL-MCNC: 0.3 MG/DL — SIGNIFICANT CHANGE UP (ref 0.2–1.2)
BLD GP AB SCN SERPL QL: NEGATIVE — SIGNIFICANT CHANGE UP
BUN SERPL-MCNC: 74 MG/DL — HIGH (ref 7–23)
CALCIUM SERPL-MCNC: 8.2 MG/DL — LOW (ref 8.4–10.5)
CHLORIDE SERPL-SCNC: 97 MMOL/L — LOW (ref 98–107)
CO2 SERPL-SCNC: 20 MMOL/L — LOW (ref 22–31)
CREAT SERPL-MCNC: 5.28 MG/DL — HIGH (ref 0.5–1.3)
EGFR: 13 ML/MIN/1.73M2 — LOW
EOSINOPHIL # BLD AUTO: 0.57 K/UL — HIGH (ref 0–0.5)
EOSINOPHIL NFR BLD AUTO: 5.4 % — SIGNIFICANT CHANGE UP (ref 0–6)
GLUCOSE BLDC GLUCOMTR-MCNC: 111 MG/DL — HIGH (ref 70–99)
GLUCOSE SERPL-MCNC: 98 MG/DL — SIGNIFICANT CHANGE UP (ref 70–99)
HCT VFR BLD CALC: 27.4 % — LOW (ref 39–50)
HGB BLD-MCNC: 8.6 G/DL — LOW (ref 13–17)
IANC: 7.4 K/UL — SIGNIFICANT CHANGE UP (ref 1.8–7.4)
IMM GRANULOCYTES NFR BLD AUTO: 0.4 % — SIGNIFICANT CHANGE UP (ref 0–0.9)
LYMPHOCYTES # BLD AUTO: 1.61 K/UL — SIGNIFICANT CHANGE UP (ref 1–3.3)
LYMPHOCYTES # BLD AUTO: 15.2 % — SIGNIFICANT CHANGE UP (ref 13–44)
MAGNESIUM SERPL-MCNC: 1.7 MG/DL — SIGNIFICANT CHANGE UP (ref 1.6–2.6)
MCHC RBC-ENTMCNC: 23.2 PG — LOW (ref 27–34)
MCHC RBC-ENTMCNC: 31.4 GM/DL — LOW (ref 32–36)
MCV RBC AUTO: 73.9 FL — LOW (ref 80–100)
MONOCYTES # BLD AUTO: 0.85 K/UL — SIGNIFICANT CHANGE UP (ref 0–0.9)
MONOCYTES NFR BLD AUTO: 8 % — SIGNIFICANT CHANGE UP (ref 2–14)
NEUTROPHILS # BLD AUTO: 7.4 K/UL — SIGNIFICANT CHANGE UP (ref 1.8–7.4)
NEUTROPHILS NFR BLD AUTO: 70.1 % — SIGNIFICANT CHANGE UP (ref 43–77)
NRBC # BLD: 0 /100 WBCS — SIGNIFICANT CHANGE UP (ref 0–0)
NRBC # FLD: 0 K/UL — SIGNIFICANT CHANGE UP (ref 0–0)
PHOSPHATE SERPL-MCNC: 6.4 MG/DL — HIGH (ref 2.5–4.5)
PLATELET # BLD AUTO: 379 K/UL — SIGNIFICANT CHANGE UP (ref 150–400)
POTASSIUM SERPL-MCNC: 4 MMOL/L — SIGNIFICANT CHANGE UP (ref 3.5–5.3)
POTASSIUM SERPL-SCNC: 4 MMOL/L — SIGNIFICANT CHANGE UP (ref 3.5–5.3)
PROT SERPL-MCNC: 6.5 G/DL — SIGNIFICANT CHANGE UP (ref 6–8.3)
RBC # BLD: 3.71 M/UL — LOW (ref 4.2–5.8)
RBC # FLD: 15 % — HIGH (ref 10.3–14.5)
RH IG SCN BLD-IMP: POSITIVE — SIGNIFICANT CHANGE UP
SODIUM SERPL-SCNC: 135 MMOL/L — SIGNIFICANT CHANGE UP (ref 135–145)
WBC # BLD: 10.56 K/UL — HIGH (ref 3.8–10.5)
WBC # FLD AUTO: 10.56 K/UL — HIGH (ref 3.8–10.5)

## 2023-10-15 PROCEDURE — 99239 HOSP IP/OBS DSCHRG MGMT >30: CPT

## 2023-10-15 RX ADMIN — CARVEDILOL PHOSPHATE 6.25 MILLIGRAM(S): 80 CAPSULE, EXTENDED RELEASE ORAL at 06:11

## 2023-10-15 RX ADMIN — HEPARIN SODIUM 5000 UNIT(S): 5000 INJECTION INTRAVENOUS; SUBCUTANEOUS at 05:42

## 2023-10-15 RX ADMIN — Medication 80 MILLIGRAM(S): at 01:28

## 2023-10-15 RX ADMIN — Medication 650 MILLIGRAM(S): at 05:42

## 2023-10-15 RX ADMIN — Medication 1334 MILLIGRAM(S): at 08:49

## 2023-10-15 NOTE — DISCHARGE NOTE NURSING/CASE MANAGEMENT/SOCIAL WORK - NSDCPNINST_GEN_ALL_CORE
Please return to ED if you develop any nausea, vomiting, diarrhea or temp of 100.4 and greater. Notify the provider if you develop any pus like drainage from incision sites or increased level of pain.

## 2023-10-15 NOTE — DISCHARGE NOTE PROVIDER - HOSPITAL COURSE
43M with history of type 2 DM (on insulin), CKD stage 5 (being worked up for possible transplant), HTN, HLD, moderate mitral regurg, latent TB (treated ~30 years ago), and recent ICU admission at [facility] (10/1-10/5/23) for acute hypoxic respiratory failure requiring BiPAP, possibly 2/2 pulm edema vs. PNA, presents after being sent in by his nephrologist for a positive Quant Gold that was drawn on Monday, admitted for r/o active TB.    Patient without active symptoms: no fevers, productive cough or night sweats. CT chest done with improvement from prior.   Seen by ID and recommended to have test 2 sputum AFB, which came back negative, as well as MTB PCR, which was also negative.   No concern for active pulmonary TB.   Patient stable for discharge home with outpatient follow-up.

## 2023-10-15 NOTE — DISCHARGE NOTE PROVIDER - NSDCCPCAREPLAN_GEN_ALL_CORE_FT
PRINCIPAL DISCHARGE DIAGNOSIS  Diagnosis: Positive QuantiFERON-TB Gold test  Assessment and Plan of Treatment: You had a positive quant-TB Gold test, which tests for prior TB infection. You were seen by the infectious disease doctors. You had sputum and PCR checked which were NEGATIVE for active pulmonary TB. Please continue to follow-up with your primary care doctor.   Return to the ER if you experience fevers, night sweats or productive cough.      SECONDARY DISCHARGE DIAGNOSES  Diagnosis: Stage 5 chronic kidney disease not on chronic dialysis  Assessment and Plan of Treatment: Please continue to take your medication as prescribed. Please follow-up closely with your nephrologist for further management of your chronic kidney disease.

## 2023-10-15 NOTE — DISCHARGE NOTE PROVIDER - NSDCFUSCHEDAPPT_GEN_ALL_CORE_FT
Haylee Lares  [facility] Physician [facility]  NEPHRO [address]  Scheduled Appointment: 10/16/2023    Rani Paniagua  [facility]  NEPHRO [address]  Scheduled Appointment: 12/14/2023

## 2023-10-15 NOTE — CHART NOTE - NSCHARTNOTEFT_GEN_A_CORE
Patient seen and examined. Please see discharge note for full physical examination and discharge details. Patient is stable for discharge today.     Amina Brewer MD  Hospitalist  Pager #: [phone]

## 2023-10-15 NOTE — DISCHARGE NOTE PROVIDER - CARE PROVIDER_API CALL
Rani Paniagua  Nephrology  [address] [place] [address], [address] [place] [address]  Phone: [phone]  Fax: [phone]  Established Patient  Follow Up Time:

## 2023-10-15 NOTE — DISCHARGE NOTE PROVIDER - NSDCMRMEDTOKEN_GEN_ALL_CORE_FT
calcium acetate 667 mg oral tablet: 2 tab(s) orally 3 times a day (with meals)  carvedilol 6.25 mg oral tablet: 1 tab(s) orally 2 times a day  Januvia 25 mg oral tablet: 1 tab(s) orally once a day  rosuvastatin 10 mg oral tablet: 1 tab(s) orally once a day (at bedtime)  Semglee (Prefilled Pen) 100 units/mL subcutaneous solution: 6 unit(s) subcutaneous once a day (at bedtime)  sodium bicarbonate 650 mg oral tablet: 1 tab(s) orally 2 times a day  torsemide 20 mg oral tablet: 4 tab(s) orally every 12 hours Please take total 80 mg (4 pills of 20 mg) every 12 hours.

## 2023-10-15 NOTE — DISCHARGE NOTE NURSING/CASE MANAGEMENT/SOCIAL WORK - PATIENT PORTAL LINK FT
You can access the FollowMyHealth Patient Portal offered by [facility] by registering at the following website: [email or web address]. By joining [employer]’s FollowMyHealth portal, you will also be able to view your health information using other applications (apps) compatible with our system.

## 2023-10-16 ENCOUNTER — APPOINTMENT (OUTPATIENT)
Dept: NEPHROLOGY | Facility: CLINIC | Age: 43
End: 2023-10-16
Payer: COMMERCIAL

## 2023-10-16 ENCOUNTER — TRANSCRIPTION ENCOUNTER (OUTPATIENT)
Age: 43
End: 2023-10-16

## 2023-10-16 VITALS
TEMPERATURE: 97.3 F | DIASTOLIC BLOOD PRESSURE: 67 MMHG | HEART RATE: 99 BPM | OXYGEN SATURATION: 97 % | WEIGHT: 231.48 LBS | BODY MASS INDEX: 29.71 KG/M2 | SYSTOLIC BLOOD PRESSURE: 102 MMHG | HEIGHT: 74 IN

## 2023-10-16 VITALS — DIASTOLIC BLOOD PRESSURE: 80 MMHG | SYSTOLIC BLOOD PRESSURE: 120 MMHG

## 2023-10-16 DIAGNOSIS — E87.20 ACIDOSIS, UNSPECIFIED: ICD-10-CM

## 2023-10-16 PROCEDURE — 99213 OFFICE O/P EST LOW 20 MIN: CPT

## 2023-10-17 PROBLEM — N18.5 CHRONIC KIDNEY DISEASE, STAGE 5: Chronic | Status: ACTIVE | Noted: 2023-10-13

## 2023-10-17 PROBLEM — Z22.7 LATENT TUBERCULOSIS: Chronic | Status: ACTIVE | Noted: 2023-10-13

## 2023-10-17 PROBLEM — E87.20 METABOLIC ACIDOSIS: Status: ACTIVE | Noted: 2023-06-15

## 2023-10-17 LAB
25(OH)D3 SERPL-MCNC: 16.3 NG/ML
ALBUMIN SERPL ELPH-MCNC: 3.5 G/DL
ANION GAP SERPL CALC-SCNC: 16 MMOL/L
APPEARANCE: CLEAR
BACTERIA: NEGATIVE /HPF
BILIRUBIN URINE: NEGATIVE
BLOOD URINE: ABNORMAL
BUN SERPL-MCNC: 86 MG/DL
CALCIUM SERPL-MCNC: 8.2 MG/DL
CALCIUM SERPL-MCNC: 8.2 MG/DL
CAST: 4 /LPF
CHLORIDE SERPL-SCNC: 96 MMOL/L
CO2 SERPL-SCNC: 22 MMOL/L
COLOR: YELLOW
CREAT SERPL-MCNC: 5.89 MG/DL
CREAT SPEC-SCNC: 123 MG/DL
EGFR: 11 ML/MIN/1.73M2
EPITHELIAL CELLS: 1 /HPF
FERRITIN SERPL-MCNC: 506 NG/ML
GLUCOSE QUALITATIVE U: 100 MG/DL
GLUCOSE SERPL-MCNC: 166 MG/DL
HCT VFR BLD CALC: 29.2 %
HGB BLD-MCNC: 8.8 G/DL
IRON SATN MFR SERPL: 19 %
IRON SERPL-MCNC: 58 UG/DL
KETONES URINE: NEGATIVE MG/DL
LEUKOCYTE ESTERASE URINE: NEGATIVE
MCHC RBC-ENTMCNC: 23.1 PG
MCHC RBC-ENTMCNC: 30.1 GM/DL
MCV RBC AUTO: 76.6 FL
MICROALBUMIN 24H UR DL<=1MG/L-MCNC: 353.6 MG/DL
MICROALBUMIN/CREAT 24H UR-RTO: 2864 MG/G
MICROSCOPIC-UA: NORMAL
NITRITE URINE: NEGATIVE
PARATHYROID HORMONE INTACT: 284 PG/ML
PH URINE: 6
PHOSPHATE SERPL-MCNC: 7.1 MG/DL
PLATELET # BLD AUTO: 352 K/UL
POTASSIUM SERPL-SCNC: 4 MMOL/L
PROTEIN URINE: 300 MG/DL
RBC # BLD: 3.81 M/UL
RBC # FLD: 15.6 %
RED BLOOD CELLS URINE: 6 /HPF
SODIUM SERPL-SCNC: 134 MMOL/L
SPECIFIC GRAVITY URINE: 1.01
TIBC SERPL-MCNC: 301 UG/DL
UIBC SERPL-MCNC: 243 UG/DL
UROBILINOGEN URINE: 0.2 MG/DL
WBC # FLD AUTO: 10.08 K/UL
WHITE BLOOD CELLS URINE: 3 /HPF

## 2023-10-17 RX ORDER — ERGOCALCIFEROL 1.25 MG/1
1.25 MG CAPSULE, LIQUID FILLED ORAL
Qty: 12 | Refills: 3 | Status: ACTIVE | COMMUNITY
Start: 2023-04-28 | End: 1900-01-01

## 2023-10-19 ENCOUNTER — APPOINTMENT (OUTPATIENT)
Dept: TRANSPLANT | Facility: CLINIC | Age: 43
End: 2023-10-19
Payer: COMMERCIAL

## 2023-10-19 VITALS
HEIGHT: 74 IN | BODY MASS INDEX: 29.26 KG/M2 | DIASTOLIC BLOOD PRESSURE: 75 MMHG | OXYGEN SATURATION: 98 % | HEART RATE: 96 BPM | TEMPERATURE: 97.6 F | WEIGHT: 228 LBS | SYSTOLIC BLOOD PRESSURE: 106 MMHG

## 2023-10-19 PROCEDURE — 99214 OFFICE O/P EST MOD 30 MIN: CPT

## 2023-10-20 ENCOUNTER — NON-APPOINTMENT (OUTPATIENT)
Age: 43
End: 2023-10-20

## 2023-10-23 ENCOUNTER — APPOINTMENT (OUTPATIENT)
Dept: INTERNAL MEDICINE | Facility: CLINIC | Age: 43
End: 2023-10-23
Payer: COMMERCIAL

## 2023-10-23 VITALS
DIASTOLIC BLOOD PRESSURE: 57 MMHG | TEMPERATURE: 98.1 F | OXYGEN SATURATION: 98 % | BODY MASS INDEX: 29.77 KG/M2 | HEART RATE: 74 BPM | SYSTOLIC BLOOD PRESSURE: 99 MMHG | HEIGHT: 74 IN | WEIGHT: 232 LBS

## 2023-10-23 DIAGNOSIS — E55.9 VITAMIN D DEFICIENCY, UNSPECIFIED: ICD-10-CM

## 2023-10-23 DIAGNOSIS — D64.9 ANEMIA, UNSPECIFIED: ICD-10-CM

## 2023-10-23 PROCEDURE — 99496 TRANSJ CARE MGMT HIGH F2F 7D: CPT

## 2023-10-24 ENCOUNTER — APPOINTMENT (OUTPATIENT)
Dept: NEPHROLOGY | Facility: CLINIC | Age: 43
End: 2023-10-24
Payer: COMMERCIAL

## 2023-10-24 VITALS
BODY MASS INDEX: 29.65 KG/M2 | DIASTOLIC BLOOD PRESSURE: 71 MMHG | TEMPERATURE: 97.4 F | HEIGHT: 74 IN | SYSTOLIC BLOOD PRESSURE: 121 MMHG | WEIGHT: 231 LBS | OXYGEN SATURATION: 98 % | HEART RATE: 89 BPM

## 2023-10-24 DIAGNOSIS — N18.4 CHRONIC KIDNEY DISEASE, STAGE 4 (SEVERE): ICD-10-CM

## 2023-10-24 PROCEDURE — 96372 THER/PROPH/DIAG INJ SC/IM: CPT

## 2023-10-24 RX ORDER — ERYTHROPOIETIN 10000 [IU]/ML
10000 INJECTION, SOLUTION INTRAVENOUS; SUBCUTANEOUS
Qty: 1 | Refills: 0 | Status: COMPLETED | OUTPATIENT
Start: 2023-10-24

## 2023-10-24 RX ADMIN — ERYTHROPOIETIN 0 UNIT/ML: 10000 INJECTION, SOLUTION INTRAVENOUS; SUBCUTANEOUS at 00:00

## 2023-10-26 PROBLEM — E55.9 VITAMIN D DEFICIENCY: Status: ACTIVE | Noted: 2023-04-28

## 2023-10-26 PROBLEM — D64.9 ANEMIA: Status: ACTIVE | Noted: 2023-02-07

## 2023-10-27 ENCOUNTER — NON-APPOINTMENT (OUTPATIENT)
Age: 43
End: 2023-10-27

## 2023-10-29 ENCOUNTER — RX RENEWAL (OUTPATIENT)
Age: 43
End: 2023-10-29

## 2023-10-29 LAB
NIGHT BLUE STAIN TISS: ABNORMAL
NIGHT BLUE STAIN TISS: ABNORMAL

## 2023-10-29 NOTE — CHART NOTE - NSCHARTNOTEFT_GEN_A_CORE
Called [facility] lab that October 1st sputum culture showed acid fast bacili, subsequent 2 sputum cultures form the 14th showed no growth.    Manjeet Coronado, Attending Physician Called [facility] lab that October 1st sputum culture showed acid fast bacili, subsequent 2 sputum cultures form the 14th showed no growth.  Dr. Demetrio sullivan infectious disease was following, and as per his note, if 2 negative cultures, then patient could have contact precautions discontinued.    Manjeet Coronado, Attending Physician

## 2023-10-30 ENCOUNTER — APPOINTMENT (OUTPATIENT)
Dept: CARDIOLOGY | Facility: CLINIC | Age: 43
End: 2023-10-30
Payer: COMMERCIAL

## 2023-10-30 PROCEDURE — A9500: CPT

## 2023-10-30 PROCEDURE — 93015 CV STRESS TEST SUPVJ I&R: CPT

## 2023-10-30 PROCEDURE — 78452 HT MUSCLE IMAGE SPECT MULT: CPT

## 2023-10-31 LAB
ALBUMIN SERPL ELPH-MCNC: 3.7 G/DL
ANION GAP SERPL CALC-SCNC: 15 MMOL/L
BUN SERPL-MCNC: 75 MG/DL
CALCIUM SERPL-MCNC: 7.6 MG/DL
CHLORIDE SERPL-SCNC: 97 MMOL/L
CO2 SERPL-SCNC: 26 MMOL/L
CREAT SERPL-MCNC: 6 MG/DL
CREAT SPEC-SCNC: 81 MG/DL
EGFR: 11 ML/MIN/1.73M2
GLUCOSE SERPL-MCNC: 141 MG/DL
HCT VFR BLD CALC: 27.9 %
HGB BLD-MCNC: 8.5 G/DL
MCHC RBC-ENTMCNC: 23.1 PG
MCHC RBC-ENTMCNC: 30.5 GM/DL
MCV RBC AUTO: 75.8 FL
MICROALBUMIN 24H UR DL<=1MG/L-MCNC: 355.4 MG/DL
MICROALBUMIN/CREAT 24H UR-RTO: 4411 MG/G
PHOSPHATE SERPL-MCNC: 7 MG/DL
PLATELET # BLD AUTO: 220 K/UL
POTASSIUM SERPL-SCNC: 4.5 MMOL/L
RBC # BLD: 3.68 M/UL
RBC # FLD: 14.6 %
SODIUM SERPL-SCNC: 139 MMOL/L
WBC # FLD AUTO: 7.84 K/UL

## 2023-11-01 LAB
APPEARANCE: CLEAR
BACTERIA: NEGATIVE /HPF
BILIRUBIN URINE: NEGATIVE
BLOOD URINE: ABNORMAL
CAST: 2 /LPF
COLOR: YELLOW
EPITHELIAL CELLS: 2 /HPF
GLUCOSE QUALITATIVE U: 100 MG/DL
KETONES URINE: NEGATIVE MG/DL
LEUKOCYTE ESTERASE URINE: NEGATIVE
MICROSCOPIC-UA: NORMAL
NITRITE URINE: NEGATIVE
PH URINE: 7
PROTEIN URINE: 300 MG/DL
RED BLOOD CELLS URINE: 12 /HPF
SPECIFIC GRAVITY URINE: 1.01
UROBILINOGEN URINE: 0.2 MG/DL
WHITE BLOOD CELLS URINE: 3 /HPF

## 2023-11-02 ENCOUNTER — APPOINTMENT (OUTPATIENT)
Dept: NEPHROLOGY | Facility: CLINIC | Age: 43
End: 2023-11-02
Payer: COMMERCIAL

## 2023-11-02 VITALS
TEMPERATURE: 97.2 F | HEART RATE: 107 BPM | DIASTOLIC BLOOD PRESSURE: 87 MMHG | SYSTOLIC BLOOD PRESSURE: 146 MMHG | WEIGHT: 242 LBS | OXYGEN SATURATION: 97 % | RESPIRATION RATE: 18 BRPM | BODY MASS INDEX: 31.06 KG/M2 | HEIGHT: 74 IN

## 2023-11-02 PROCEDURE — 96372 THER/PROPH/DIAG INJ SC/IM: CPT

## 2023-11-02 RX ORDER — ERYTHROPOIETIN 10000 [IU]/ML
10000 INJECTION, SOLUTION INTRAVENOUS; SUBCUTANEOUS
Qty: 1 | Refills: 0 | Status: COMPLETED | OUTPATIENT
Start: 2023-11-02

## 2023-11-02 RX ORDER — AMLODIPINE BESYLATE 5 MG/1
5 TABLET ORAL
Qty: 90 | Refills: 0 | Status: DISCONTINUED | COMMUNITY
Start: 2023-03-10

## 2023-11-02 RX ORDER — CARVEDILOL 3.12 MG/1
3.12 TABLET, FILM COATED ORAL
Qty: 60 | Refills: 0 | Status: DISCONTINUED | COMMUNITY
Start: 2023-09-08

## 2023-11-02 RX ORDER — LABETALOL HYDROCHLORIDE 200 MG/1
200 TABLET, FILM COATED ORAL
Qty: 270 | Refills: 2 | Status: DISCONTINUED | COMMUNITY
Start: 2023-10-09 | End: 2023-11-02

## 2023-11-12 LAB
NIGHT BLUE STAIN TISS: ABNORMAL
NIGHT BLUE STAIN TISS: ABNORMAL

## 2023-11-16 ENCOUNTER — APPOINTMENT (OUTPATIENT)
Dept: INFECTIOUS DISEASE | Facility: CLINIC | Age: 43
End: 2023-11-16
Payer: COMMERCIAL

## 2023-11-16 ENCOUNTER — APPOINTMENT (OUTPATIENT)
Dept: NEPHROLOGY | Facility: CLINIC | Age: 43
End: 2023-11-16
Payer: COMMERCIAL

## 2023-11-16 VITALS
HEIGHT: 74 IN | HEART RATE: 98 BPM | RESPIRATION RATE: 18 BRPM | BODY MASS INDEX: 30.16 KG/M2 | SYSTOLIC BLOOD PRESSURE: 115 MMHG | DIASTOLIC BLOOD PRESSURE: 73 MMHG | TEMPERATURE: 96.7 F | OXYGEN SATURATION: 98 % | WEIGHT: 235 LBS

## 2023-11-16 VITALS
DIASTOLIC BLOOD PRESSURE: 80 MMHG | HEART RATE: 107 BPM | HEIGHT: 74 IN | WEIGHT: 238 LBS | TEMPERATURE: 98.1 F | BODY MASS INDEX: 30.54 KG/M2 | OXYGEN SATURATION: 100 % | SYSTOLIC BLOOD PRESSURE: 130 MMHG

## 2023-11-16 DIAGNOSIS — A31.9 MYCOBACTERIAL INFECTION, UNSPECIFIED: ICD-10-CM

## 2023-11-16 DIAGNOSIS — Z23 ENCOUNTER FOR IMMUNIZATION: ICD-10-CM

## 2023-11-16 LAB
ALBUMIN SERPL ELPH-MCNC: 3.7 G/DL
ANION GAP SERPL CALC-SCNC: 16 MMOL/L
BUN SERPL-MCNC: 78 MG/DL
CALCIUM SERPL-MCNC: 8.2 MG/DL
CHLORIDE SERPL-SCNC: 101 MMOL/L
CO2 SERPL-SCNC: 21 MMOL/L
CREAT SERPL-MCNC: 6.83 MG/DL
EGFR: 10 ML/MIN/1.73M2
GLUCOSE SERPL-MCNC: 138 MG/DL
HCT VFR BLD CALC: 26.5 %
HGB BLD-MCNC: 8.6 G/DL
MCHC RBC-ENTMCNC: 23.8 PG
MCHC RBC-ENTMCNC: 32.5 GM/DL
MCV RBC AUTO: 73.4 FL
PHOSPHATE SERPL-MCNC: 7.2 MG/DL
PLATELET # BLD AUTO: 229 K/UL
POTASSIUM SERPL-SCNC: 4.6 MMOL/L
RBC # BLD: 3.61 M/UL
RBC # FLD: 14.8 %
SODIUM SERPL-SCNC: 138 MMOL/L
WBC # FLD AUTO: 7.55 K/UL

## 2023-11-16 PROCEDURE — 96372 THER/PROPH/DIAG INJ SC/IM: CPT

## 2023-11-16 PROCEDURE — 99204 OFFICE O/P NEW MOD 45 MIN: CPT | Mod: 25

## 2023-11-16 PROCEDURE — 90677 PCV20 VACCINE IM: CPT

## 2023-11-16 PROCEDURE — G0009: CPT

## 2023-11-16 RX ORDER — ERYTHROPOIETIN 20000 [IU]/ML
20000 INJECTION, SOLUTION INTRAVENOUS; SUBCUTANEOUS
Qty: 1 | Refills: 0 | Status: COMPLETED | OUTPATIENT
Start: 2023-11-16

## 2023-11-16 RX ADMIN — ERYTHROPOIETIN 1 UNIT/ML: 20000 INJECTION, SOLUTION INTRAVENOUS; SUBCUTANEOUS at 00:00

## 2023-11-17 ENCOUNTER — OUTPATIENT (OUTPATIENT)
Dept: OUTPATIENT SERVICES | Facility: HOSPITAL | Age: 43
LOS: 1 days | End: 2023-11-17
Payer: COMMERCIAL

## 2023-11-17 VITALS
SYSTOLIC BLOOD PRESSURE: 100 MMHG | WEIGHT: 235.23 LBS | RESPIRATION RATE: 16 BRPM | HEART RATE: 101 BPM | OXYGEN SATURATION: 100 % | DIASTOLIC BLOOD PRESSURE: 69 MMHG | HEIGHT: 74 IN | TEMPERATURE: 98 F

## 2023-11-17 DIAGNOSIS — N18.5 CHRONIC KIDNEY DISEASE, STAGE 5: ICD-10-CM

## 2023-11-17 DIAGNOSIS — Z01.818 ENCOUNTER FOR OTHER PREPROCEDURAL EXAMINATION: ICD-10-CM

## 2023-11-17 DIAGNOSIS — Z29.9 ENCOUNTER FOR PROPHYLACTIC MEASURES, UNSPECIFIED: ICD-10-CM

## 2023-11-17 DIAGNOSIS — Z98.890 OTHER SPECIFIED POSTPROCEDURAL STATES: Chronic | ICD-10-CM

## 2023-11-17 DIAGNOSIS — E11.9 TYPE 2 DIABETES MELLITUS WITHOUT COMPLICATIONS: ICD-10-CM

## 2023-11-17 LAB
ANION GAP SERPL CALC-SCNC: 14 MMOL/L — SIGNIFICANT CHANGE UP (ref 5–17)
ANION GAP SERPL CALC-SCNC: 14 MMOL/L — SIGNIFICANT CHANGE UP (ref 5–17)
BLD GP AB SCN SERPL QL: NEGATIVE — SIGNIFICANT CHANGE UP
BLD GP AB SCN SERPL QL: NEGATIVE — SIGNIFICANT CHANGE UP
BUN SERPL-MCNC: 74 MG/DL — HIGH (ref 7–23)
BUN SERPL-MCNC: 74 MG/DL — HIGH (ref 7–23)
CALCIUM SERPL-MCNC: 8.8 MG/DL — SIGNIFICANT CHANGE UP (ref 8.4–10.5)
CALCIUM SERPL-MCNC: 8.8 MG/DL — SIGNIFICANT CHANGE UP (ref 8.4–10.5)
CHLORIDE SERPL-SCNC: 99 MMOL/L — SIGNIFICANT CHANGE UP (ref 96–108)
CHLORIDE SERPL-SCNC: 99 MMOL/L — SIGNIFICANT CHANGE UP (ref 96–108)
CO2 SERPL-SCNC: 22 MMOL/L — SIGNIFICANT CHANGE UP (ref 22–31)
CO2 SERPL-SCNC: 22 MMOL/L — SIGNIFICANT CHANGE UP (ref 22–31)
COVID-19 NUCLEOCAPSID  GAM ANTIBODY INTERPRETATION: POSITIVE
CREAT SERPL-MCNC: 6.97 MG/DL — HIGH (ref 0.5–1.3)
CREAT SERPL-MCNC: 6.97 MG/DL — HIGH (ref 0.5–1.3)
EGFR: 9 ML/MIN/1.73M2 — LOW
EGFR: 9 ML/MIN/1.73M2 — LOW
GLUCOSE SERPL-MCNC: 129 MG/DL — HIGH (ref 70–99)
GLUCOSE SERPL-MCNC: 129 MG/DL — HIGH (ref 70–99)
HCT VFR BLD CALC: 25.9 % — LOW (ref 39–50)
HCT VFR BLD CALC: 25.9 % — LOW (ref 39–50)
HEPATITIS A IGG ANTIBODY: NONREACTIVE
HGB BLD-MCNC: 8.2 G/DL — LOW (ref 13–17)
HGB BLD-MCNC: 8.2 G/DL — LOW (ref 13–17)
MCHC RBC-ENTMCNC: 22.9 PG — LOW (ref 27–34)
MCHC RBC-ENTMCNC: 22.9 PG — LOW (ref 27–34)
MCHC RBC-ENTMCNC: 31.7 GM/DL — LOW (ref 32–36)
MCHC RBC-ENTMCNC: 31.7 GM/DL — LOW (ref 32–36)
MCV RBC AUTO: 72.3 FL — LOW (ref 80–100)
MCV RBC AUTO: 72.3 FL — LOW (ref 80–100)
NRBC # BLD: 0 /100 WBCS — SIGNIFICANT CHANGE UP (ref 0–0)
NRBC # BLD: 0 /100 WBCS — SIGNIFICANT CHANGE UP (ref 0–0)
PLATELET # BLD AUTO: 226 K/UL — SIGNIFICANT CHANGE UP (ref 150–400)
PLATELET # BLD AUTO: 226 K/UL — SIGNIFICANT CHANGE UP (ref 150–400)
POTASSIUM SERPL-MCNC: 4 MMOL/L — SIGNIFICANT CHANGE UP (ref 3.5–5.3)
POTASSIUM SERPL-MCNC: 4 MMOL/L — SIGNIFICANT CHANGE UP (ref 3.5–5.3)
POTASSIUM SERPL-SCNC: 4 MMOL/L — SIGNIFICANT CHANGE UP (ref 3.5–5.3)
POTASSIUM SERPL-SCNC: 4 MMOL/L — SIGNIFICANT CHANGE UP (ref 3.5–5.3)
RBC # BLD: 3.58 M/UL — LOW (ref 4.2–5.8)
RBC # BLD: 3.58 M/UL — LOW (ref 4.2–5.8)
RBC # FLD: 14.6 % — HIGH (ref 10.3–14.5)
RBC # FLD: 14.6 % — HIGH (ref 10.3–14.5)
RH IG SCN BLD-IMP: POSITIVE — SIGNIFICANT CHANGE UP
RH IG SCN BLD-IMP: POSITIVE — SIGNIFICANT CHANGE UP
SARS-COV-2 AB SERPL QL IA: 12.9 INDEX
SODIUM SERPL-SCNC: 135 MMOL/L — SIGNIFICANT CHANGE UP (ref 135–145)
SODIUM SERPL-SCNC: 135 MMOL/L — SIGNIFICANT CHANGE UP (ref 135–145)
WBC # BLD: 6.75 K/UL — SIGNIFICANT CHANGE UP (ref 3.8–10.5)
WBC # BLD: 6.75 K/UL — SIGNIFICANT CHANGE UP (ref 3.8–10.5)
WBC # FLD AUTO: 6.75 K/UL — SIGNIFICANT CHANGE UP (ref 3.8–10.5)
WBC # FLD AUTO: 6.75 K/UL — SIGNIFICANT CHANGE UP (ref 3.8–10.5)

## 2023-11-17 PROCEDURE — 83036 HEMOGLOBIN GLYCOSYLATED A1C: CPT

## 2023-11-17 PROCEDURE — 85027 COMPLETE CBC AUTOMATED: CPT

## 2023-11-17 PROCEDURE — 86900 BLOOD TYPING SEROLOGIC ABO: CPT

## 2023-11-17 PROCEDURE — 36415 COLL VENOUS BLD VENIPUNCTURE: CPT

## 2023-11-17 PROCEDURE — 86850 RBC ANTIBODY SCREEN: CPT

## 2023-11-17 PROCEDURE — 86901 BLOOD TYPING SEROLOGIC RH(D): CPT

## 2023-11-17 PROCEDURE — 80048 BASIC METABOLIC PNL TOTAL CA: CPT

## 2023-11-17 PROCEDURE — G0463: CPT

## 2023-11-17 RX ORDER — SODIUM CHLORIDE 9 MG/ML
3 INJECTION INTRAMUSCULAR; INTRAVENOUS; SUBCUTANEOUS EVERY 8 HOURS
Refills: 0 | Status: DISCONTINUED | OUTPATIENT
Start: 2023-11-27 | End: 2023-12-11

## 2023-11-17 RX ORDER — CHLORHEXIDINE GLUCONATE 213 G/1000ML
1 SOLUTION TOPICAL DAILY
Refills: 0 | Status: DISCONTINUED | OUTPATIENT
Start: 2023-11-27 | End: 2023-12-11

## 2023-11-17 RX ORDER — LIDOCAINE HCL 20 MG/ML
0.2 VIAL (ML) INJECTION ONCE
Refills: 0 | Status: DISCONTINUED | OUTPATIENT
Start: 2023-11-27 | End: 2023-12-11

## 2023-11-17 NOTE — H&P PST ADULT - ASSESSMENT
CAPRINI SCORE [CLOT]    AGE RELATED RISK FACTORS                                                       MOBILITY RELATED FACTORS  [x ] Age 41-60 years                                            (1 Point)                  [ ] Bed rest                                                        (1 Point)  [ ] Age: 61-74 years                                           (2 Points)                 [ ] Plaster cast                                                   (2 Points)  [ ] Age= 75 years                                              (3 Points)                 [ ] Bed bound for more than 72 hours                 (2 Points)    DISEASE RELATED RISK FACTORS                                               GENDER SPECIFIC FACTORS  [ ] Edema in the lower extremities                       (1 Point)                  [ ] Pregnancy                                                     (1 Point)  [ ] Varicose veins                                               (1 Point)                  [ ] Post-partum < 6 weeks                                   (1 Point)             [x ] BMI > 25 Kg/m2                                            (1 Point)                  [ ] Hormonal therapy  or oral contraception          (1 Point)                 [ ] Sepsis (in the previous month)                        (1 Point)                  [ ] History of pregnancy complications                 (1 point)  [ ] Pneumonia or serious lung disease                                               [ ] Unexplained or recurrent                     (1 Point)           (in the previous month)                               (1 Point)  [ ] Abnormal pulmonary function test                     (1 Point)                 SURGERY RELATED RISK FACTORS  [ ] Acute myocardial infarction                              (1 Point)                 [ ]  Section                                             (1 Point)  [ ] Congestive heart failure (in the previous month)  (1 Point)               [ ] Minor surgery                                                  (1 Point)   [ ] Inflammatory bowel disease                             (1 Point)                 [ ] Arthroscopic surgery                                        (2 Points)  [ ] Central venous access                                      (2 Points)                [ x] General surgery lasting more than 45 minutes   (2 Points)       [ ] Stroke (in the previous month)                          (5 Points)               [ ] Elective arthroplasty                                         (5 Points)                                                                                                                                               HEMATOLOGY RELATED FACTORS                                                 TRAUMA RELATED RISK FACTORS  [ ] Prior episodes of VTE                                     (3 Points)                [ ] Fracture of the hip, pelvis, or leg                       (5 Points)  [ ] Positive family history for VTE                         (3 Points)                 [ ] Acute spinal cord injury (in the previous month)  (5 Points)  [ ] Prothrombin 00775 A                                     (3 Points)                 [ ] Paralysis  (less than 1 month)                             (5 Points)  [ ] Factor V Leiden                                             (3 Points)                  [ ] Multiple Trauma within 1 month                        (5 Points)  [ ] Lupus anticoagulants                                     (3 Points)                                                           [ ] Anticardiolipin antibodies                               (3 Points)                                                       [ ] High homocysteine in the blood                      (3 Points)                                             [ ] Other congenital or acquired thrombophilia      (3 Points)                                                [ ] Heparin induced thrombocytopenia                  (3 Points)                                          Total Score [   4       ]    Caprini Score 0 - 2:  Low Risk, No VTE Prophylaxis required for most patients, encourage ambulation  Caprini Score 3 - 6:  At Risk, pharmacologic VTE prophylaxis is indicated for most patients (in the absence of a contraindication)  Caprini Score Greater than or = 7:  High Risk, pharmacologic VTE prophylaxis is indicated for most patients (in the absence of a contraindication)    DASI score: 8.23  DASI activity: bike 1x/week 10 mins denies C/P, SOB, palpitations at this time   Loose teeth or denture: no loose,  or crack teeth

## 2023-11-17 NOTE — H&P PST ADULT - PROBLEM SELECTOR PLAN 1
monitor BMP at PST  continue calcium  bicarb daily  scheduled for insertion of peritoneal dialysis catheter

## 2023-11-17 NOTE — H&P PST ADULT - HISTORY OF PRESENT ILLNESS
This is a male with PMHx of insulin dependent type 2 diabetes mellitus, CKD stage 5, anemia receiving procrit hypertension, hyperlipidemia, mitral regurgitation, latent tuberculosis with reported treatment ~30 years ago, presents for a pre transplant evaluation.     His medical history is significant for recent hospitalization at [facility] (10/1-10/5/23), with ICU admission for acute hypoxic respiratory failure requiring BiPAP, considered to be because of pulmonary edema vs. Pneumonia after improvement with aggressive diuresis. He has recovered from his illness and has been evaluated for possible transplantation.  He is presents her for PST.    PST cbc, BMP, HGB A1c, T&S   This is a male with PMHx of insulin dependent type 2 diabetes mellitus, CKD stage 5, anemia receiving procrit hypertension, hyperlipidemia, mitral regurgitation, latent tuberculosis with reported treatment ~30 years ago, presents for a pre transplant evaluation.     His medical history is significant for recent hospitalization at [facility] (10/1-10/5/23), with ICU admission for acute hypoxic respiratory failure requiring BiPAP, considered to be because of pulmonary edema vs. Pneumonia after improvement with aggressive diuresis. He has recovered from his illness and has been evaluated for possible transplantation.  He is presents her for PST.    PST cbc, BMP, HGB A1c,

## 2023-11-17 NOTE — H&P PST ADULT - NSICDXPROCEDURE_GEN_ALL_CORE_FT
PROCEDURES:  Insertion, catheter, dialysis, tunneled, peritoneal, open 17-Nov-2023 17:22:37  Anne Colon

## 2023-11-17 NOTE — H&P PST ADULT - PROBLEM SELECTOR PLAN 2
hold Jardiance 3 days before surgery last dose 11/23  reduce long acting insulin by 20 % the night before surgery  monitor HGB A1C at PST  finger stick on admission

## 2023-11-17 NOTE — H&P PST ADULT - PAIN GOAL
[No Edema] : there was no peripheral edema [Declined Breast Exam] : declined breast exam  [Declined Rectal Exam] : declined rectal exam [Normal Posterior Cervical Nodes] : no posterior cervical lymphadenopathy [Normal Anterior Cervical Nodes] : no anterior cervical lymphadenopathy [Normal] : affect was normal and insight and judgment were intact [de-identified] : declined 4

## 2023-11-17 NOTE — H&P PST ADULT - ATTENDING COMMENTS
for lap possible open insertion of PD catheter  risks , benefits, alternatives and possible complications discussed   please refer to my outpatient notes for details  consent signed in chart.

## 2023-11-18 LAB
A1C WITH ESTIMATED AVERAGE GLUCOSE RESULT: 6.5 % — HIGH (ref 4–5.6)
A1C WITH ESTIMATED AVERAGE GLUCOSE RESULT: 6.5 % — HIGH (ref 4–5.6)
ESTIMATED AVERAGE GLUCOSE: 140 MG/DL — HIGH (ref 68–114)
ESTIMATED AVERAGE GLUCOSE: 140 MG/DL — HIGH (ref 68–114)

## 2023-11-20 LAB — STRONGYLOIDES AB SER IA-ACNC: NEGATIVE

## 2023-11-22 LAB
CULTURE RESULTS: ABNORMAL
CULTURE RESULTS: ABNORMAL
SPECIMEN SOURCE: SIGNIFICANT CHANGE UP
SPECIMEN SOURCE: SIGNIFICANT CHANGE UP

## 2023-11-26 ENCOUNTER — TRANSCRIPTION ENCOUNTER (OUTPATIENT)
Age: 43
End: 2023-11-26

## 2023-11-27 ENCOUNTER — TRANSCRIPTION ENCOUNTER (OUTPATIENT)
Age: 43
End: 2023-11-27

## 2023-11-27 ENCOUNTER — APPOINTMENT (OUTPATIENT)
Dept: TRANSPLANT | Facility: HOSPITAL | Age: 43
End: 2023-11-27

## 2023-11-27 ENCOUNTER — OUTPATIENT (OUTPATIENT)
Dept: INPATIENT UNIT | Facility: HOSPITAL | Age: 43
LOS: 1 days | End: 2023-11-27
Payer: COMMERCIAL

## 2023-11-27 VITALS
HEART RATE: 102 BPM | OXYGEN SATURATION: 99 % | SYSTOLIC BLOOD PRESSURE: 102 MMHG | DIASTOLIC BLOOD PRESSURE: 68 MMHG | TEMPERATURE: 98 F | RESPIRATION RATE: 20 BRPM | HEIGHT: 74 IN | WEIGHT: 235.23 LBS

## 2023-11-27 DIAGNOSIS — N18.5 CHRONIC KIDNEY DISEASE, STAGE 5: ICD-10-CM

## 2023-11-27 DIAGNOSIS — Z98.890 OTHER SPECIFIED POSTPROCEDURAL STATES: Chronic | ICD-10-CM

## 2023-11-27 LAB
BASE EXCESS BLDV CALC-SCNC: -2.1 MMOL/L — LOW (ref -2–3)
BASE EXCESS BLDV CALC-SCNC: -2.1 MMOL/L — LOW (ref -2–3)
CA-I SERPL-SCNC: 1.03 MMOL/L — LOW (ref 1.15–1.33)
CA-I SERPL-SCNC: 1.03 MMOL/L — LOW (ref 1.15–1.33)
CHLORIDE BLDV-SCNC: 100 MMOL/L — SIGNIFICANT CHANGE UP (ref 96–108)
CHLORIDE BLDV-SCNC: 100 MMOL/L — SIGNIFICANT CHANGE UP (ref 96–108)
CO2 BLDV-SCNC: 26 MMOL/L — SIGNIFICANT CHANGE UP (ref 22–26)
CO2 BLDV-SCNC: 26 MMOL/L — SIGNIFICANT CHANGE UP (ref 22–26)
GAS PNL BLDV: 135 MMOL/L — LOW (ref 136–145)
GAS PNL BLDV: 135 MMOL/L — LOW (ref 136–145)
GAS PNL BLDV: SIGNIFICANT CHANGE UP
GLUCOSE BLDC GLUCOMTR-MCNC: 146 MG/DL — HIGH (ref 70–99)
GLUCOSE BLDC GLUCOMTR-MCNC: 146 MG/DL — HIGH (ref 70–99)
GLUCOSE BLDC GLUCOMTR-MCNC: 168 MG/DL — HIGH (ref 70–99)
GLUCOSE BLDC GLUCOMTR-MCNC: 168 MG/DL — HIGH (ref 70–99)
GLUCOSE BLDC GLUCOMTR-MCNC: 208 MG/DL — HIGH (ref 70–99)
GLUCOSE BLDC GLUCOMTR-MCNC: 208 MG/DL — HIGH (ref 70–99)
GLUCOSE BLDV-MCNC: 131 MG/DL — HIGH (ref 70–99)
GLUCOSE BLDV-MCNC: 131 MG/DL — HIGH (ref 70–99)
HCO3 BLDV-SCNC: 25 MMOL/L — SIGNIFICANT CHANGE UP (ref 22–29)
HCO3 BLDV-SCNC: 25 MMOL/L — SIGNIFICANT CHANGE UP (ref 22–29)
HCT VFR BLDA CALC: 26 % — LOW (ref 39–51)
HCT VFR BLDA CALC: 26 % — LOW (ref 39–51)
HGB BLD CALC-MCNC: 8.5 G/DL — LOW (ref 12.6–17.4)
HGB BLD CALC-MCNC: 8.5 G/DL — LOW (ref 12.6–17.4)
LACTATE BLDV-MCNC: 1 MMOL/L — SIGNIFICANT CHANGE UP (ref 0.5–2)
LACTATE BLDV-MCNC: 1 MMOL/L — SIGNIFICANT CHANGE UP (ref 0.5–2)
OTHER CELLS CSF MANUAL: 5.7 ML/DL — LOW (ref 18–22)
OTHER CELLS CSF MANUAL: 5.7 ML/DL — LOW (ref 18–22)
PCO2 BLDV: 49 MMHG — SIGNIFICANT CHANGE UP (ref 42–55)
PCO2 BLDV: 49 MMHG — SIGNIFICANT CHANGE UP (ref 42–55)
PH BLDV: 7.31 — LOW (ref 7.32–7.43)
PH BLDV: 7.31 — LOW (ref 7.32–7.43)
PO2 BLDV: 33 MMHG — SIGNIFICANT CHANGE UP (ref 25–45)
PO2 BLDV: 33 MMHG — SIGNIFICANT CHANGE UP (ref 25–45)
POTASSIUM BLDV-SCNC: 4.5 MMOL/L — SIGNIFICANT CHANGE UP (ref 3.5–5.1)
POTASSIUM BLDV-SCNC: 4.5 MMOL/L — SIGNIFICANT CHANGE UP (ref 3.5–5.1)
SAO2 % BLDV: 45.6 % — LOW (ref 67–88)
SAO2 % BLDV: 45.6 % — LOW (ref 67–88)

## 2023-11-27 PROCEDURE — 49324 LAP INSERT TUNNEL IP CATH: CPT | Mod: GC

## 2023-11-27 DEVICE — CATH FLEXNECK ADLT CLASSIC 6CM MED: Type: IMPLANTABLE DEVICE | Status: FUNCTIONAL

## 2023-11-27 RX ORDER — INSULIN GLARGINE 100 [IU]/ML
6 INJECTION, SOLUTION SUBCUTANEOUS ONCE
Refills: 0 | Status: COMPLETED | OUTPATIENT
Start: 2023-11-27 | End: 2023-11-27

## 2023-11-27 RX ORDER — SODIUM BICARBONATE 1 MEQ/ML
1 SYRINGE (ML) INTRAVENOUS
Qty: 0 | Refills: 0 | DISCHARGE

## 2023-11-27 RX ORDER — CALCIUM ACETATE 667 MG
2 TABLET ORAL
Refills: 0 | DISCHARGE

## 2023-11-27 RX ORDER — CARVEDILOL PHOSPHATE 80 MG/1
1 CAPSULE, EXTENDED RELEASE ORAL
Refills: 0 | DISCHARGE

## 2023-11-27 RX ORDER — INSULIN GLARGINE 100 [IU]/ML
6 INJECTION, SOLUTION SUBCUTANEOUS
Refills: 0 | DISCHARGE

## 2023-11-27 RX ORDER — INSULIN LISPRO 100/ML
VIAL (ML) SUBCUTANEOUS
Refills: 0 | Status: DISCONTINUED | OUTPATIENT
Start: 2023-11-27 | End: 2023-12-11

## 2023-11-27 RX ORDER — ACETAMINOPHEN 500 MG
2 TABLET ORAL
Qty: 0 | Refills: 0 | DISCHARGE
Start: 2023-11-27

## 2023-11-27 RX ORDER — INSULIN LISPRO 100/ML
VIAL (ML) SUBCUTANEOUS AT BEDTIME
Refills: 0 | Status: DISCONTINUED | OUTPATIENT
Start: 2023-11-27 | End: 2023-12-11

## 2023-11-27 RX ORDER — ACETAMINOPHEN 500 MG
650 TABLET ORAL EVERY 6 HOURS
Refills: 0 | Status: DISCONTINUED | OUTPATIENT
Start: 2023-11-27 | End: 2023-12-11

## 2023-11-27 RX ORDER — CARVEDILOL PHOSPHATE 80 MG/1
6.25 CAPSULE, EXTENDED RELEASE ORAL EVERY 12 HOURS
Refills: 0 | Status: DISCONTINUED | OUTPATIENT
Start: 2023-11-27 | End: 2023-12-11

## 2023-11-27 RX ORDER — SODIUM CHLORIDE 9 MG/ML
1000 INJECTION, SOLUTION INTRAVENOUS
Refills: 0 | Status: DISCONTINUED | OUTPATIENT
Start: 2023-11-27 | End: 2023-12-11

## 2023-11-27 RX ORDER — ROSUVASTATIN CALCIUM 5 MG/1
1 TABLET ORAL
Refills: 0 | DISCHARGE

## 2023-11-27 RX ORDER — FENTANYL CITRATE 50 UG/ML
25 INJECTION INTRAVENOUS
Refills: 0 | Status: DISCONTINUED | OUTPATIENT
Start: 2023-11-27 | End: 2023-11-28

## 2023-11-27 RX ORDER — CEFAZOLIN SODIUM 1 G
2000 VIAL (EA) INJECTION ONCE
Refills: 0 | Status: COMPLETED | OUTPATIENT
Start: 2023-11-27 | End: 2023-11-27

## 2023-11-27 RX ORDER — HYDROMORPHONE HYDROCHLORIDE 2 MG/ML
0.5 INJECTION INTRAMUSCULAR; INTRAVENOUS; SUBCUTANEOUS
Refills: 0 | Status: DISCONTINUED | OUTPATIENT
Start: 2023-11-27 | End: 2023-11-28

## 2023-11-27 RX ORDER — SODIUM BICARBONATE 1 MEQ/ML
650 SYRINGE (ML) INTRAVENOUS
Refills: 0 | Status: DISCONTINUED | OUTPATIENT
Start: 2023-11-27 | End: 2023-12-11

## 2023-11-27 RX ORDER — ATORVASTATIN CALCIUM 80 MG/1
20 TABLET, FILM COATED ORAL AT BEDTIME
Refills: 0 | Status: DISCONTINUED | OUTPATIENT
Start: 2023-11-27 | End: 2023-12-11

## 2023-11-27 RX ORDER — CALCIUM ACETATE 667 MG
1334 TABLET ORAL
Refills: 0 | Status: DISCONTINUED | OUTPATIENT
Start: 2023-11-27 | End: 2023-12-11

## 2023-11-27 RX ORDER — CARVEDILOL PHOSPHATE 80 MG/1
6.25 CAPSULE, EXTENDED RELEASE ORAL ONCE
Refills: 0 | Status: COMPLETED | OUTPATIENT
Start: 2023-11-27 | End: 2023-11-27

## 2023-11-27 RX ORDER — DEXTROSE 50 % IN WATER 50 %
15 SYRINGE (ML) INTRAVENOUS ONCE
Refills: 0 | Status: DISCONTINUED | OUTPATIENT
Start: 2023-11-27 | End: 2023-12-11

## 2023-11-27 RX ORDER — DEXTROSE 50 % IN WATER 50 %
25 SYRINGE (ML) INTRAVENOUS ONCE
Refills: 0 | Status: DISCONTINUED | OUTPATIENT
Start: 2023-11-27 | End: 2023-12-11

## 2023-11-27 RX ORDER — GLUCAGON INJECTION, SOLUTION 0.5 MG/.1ML
1 INJECTION, SOLUTION SUBCUTANEOUS ONCE
Refills: 0 | Status: DISCONTINUED | OUTPATIENT
Start: 2023-11-27 | End: 2023-12-11

## 2023-11-27 RX ORDER — SITAGLIPTIN 50 MG/1
1 TABLET, FILM COATED ORAL
Refills: 0 | DISCHARGE

## 2023-11-27 RX ORDER — ONDANSETRON 8 MG/1
4 TABLET, FILM COATED ORAL EVERY 6 HOURS
Refills: 0 | Status: DISCONTINUED | OUTPATIENT
Start: 2023-11-27 | End: 2023-11-28

## 2023-11-27 RX ORDER — DEXTROSE 50 % IN WATER 50 %
12.5 SYRINGE (ML) INTRAVENOUS ONCE
Refills: 0 | Status: DISCONTINUED | OUTPATIENT
Start: 2023-11-27 | End: 2023-12-11

## 2023-11-27 RX ADMIN — CARVEDILOL PHOSPHATE 6.25 MILLIGRAM(S): 80 CAPSULE, EXTENDED RELEASE ORAL at 17:16

## 2023-11-27 RX ADMIN — INSULIN GLARGINE 6 UNIT(S): 100 INJECTION, SOLUTION SUBCUTANEOUS at 23:30

## 2023-11-27 RX ADMIN — SODIUM CHLORIDE 3 MILLILITER(S): 9 INJECTION INTRAMUSCULAR; INTRAVENOUS; SUBCUTANEOUS at 16:03

## 2023-11-27 RX ADMIN — ATORVASTATIN CALCIUM 20 MILLIGRAM(S): 80 TABLET, FILM COATED ORAL at 22:25

## 2023-11-27 NOTE — ASU DISCHARGE PLAN (ADULT/PEDIATRIC) - CARE PROVIDER_API CALL
Natacha Nicolas [facility]  Surgery  [address] [place] [id number]  Phone: [phone]  Fax: [phone]  Follow Up Time: 2 weeks

## 2023-11-27 NOTE — ASU DISCHARGE PLAN (ADULT/PEDIATRIC) - ASU DC SPECIAL INSTRUCTIONSFT
- Avoid heavy lifting anything over 5lbs for six weeks following your surgery date. Do NOT drive a car or operate machinery unless cleared by your transplant surgeon during your follow up visit. Avoid straining, use stool softners as needed. Stop stool softners if diarrhea develops.   - Call transplant clinic ([phone]) if you develop fever (over 100.4F), increased abdominal pain, redness/swelling or bleeding around your incision site

## 2023-11-27 NOTE — PRE-ANESTHESIA EVALUATION ADULT - NSANTHPMHFT_GEN_ALL_CORE
Medical history and ROS reviewed  ET > 4 METS, no CP, no SOB, EF 52%  HTN, HLD, IDDM  Admitted in October to ICU for respiratory distress (pneumonia vs pulmonary edema)  CKD 5 not on HD

## 2023-11-27 NOTE — ASU PATIENT PROFILE, ADULT - FALL HARM RISK - UNIVERSAL INTERVENTIONS
Bed in lowest position, wheels locked, appropriate side rails in place/Call bell, personal items and telephone in reach/Instruct patient to call for assistance before getting out of bed or chair/Non-slip footwear when patient is out of bed/Marty to call system/Physically safe environment - no spills, clutter or unnecessary equipment/Purposeful Proactive Rounding/Room/bathroom lighting operational, light cord in reach

## 2023-11-27 NOTE — ASU PATIENT PROFILE, ADULT - HOW PATIENT ADDRESSED, PROFILE
Goals:  - Learn to eat protein first at every meal and include protein in every snack    - Start to decrease portion sizes  - Limit eating out at restaurants  - Start to increase fruits, vegetables, and whole grains in the diet and decrease desserts/ candy/ high fat and processed foods   - Decrease caffeinated and/or carbonated beverages (16oz of coffee allowed per day)  - Practice the 30/60 minute rule  Stop drinking 30 minutes prior to your meal, with you meal, and for 60 minutes after your meal   - Drink at least 64oz of water or other calorie free beverage per day  - Practice sipping beverages slowly  - Practice chewing your foods thoroughly-- to liquid consistency-- before swallowing   - Decrease/limit alcohol intake  - Keep a food journal   - Start taking a general multivitamin   - You should exercise for at least 30 minutes, 5 days a week 
Zach

## 2023-11-27 NOTE — BRIEF OPERATIVE NOTE - NSICDXBRIEFPROCEDURE_GEN_ALL_CORE_FT
PROCEDURES:  Endoscopic insertion of peritoneal dialysis catheter 27-Nov-2023 14:26:06  BrambilaDesirae Pitt

## 2023-11-28 VITALS
DIASTOLIC BLOOD PRESSURE: 94 MMHG | RESPIRATION RATE: 18 BRPM | SYSTOLIC BLOOD PRESSURE: 167 MMHG | TEMPERATURE: 98 F | HEART RATE: 100 BPM | OXYGEN SATURATION: 100 %

## 2023-11-28 LAB
ANION GAP SERPL CALC-SCNC: 15 MMOL/L — SIGNIFICANT CHANGE UP (ref 5–17)
ANION GAP SERPL CALC-SCNC: 15 MMOL/L — SIGNIFICANT CHANGE UP (ref 5–17)
BUN SERPL-MCNC: 92 MG/DL — HIGH (ref 7–23)
BUN SERPL-MCNC: 92 MG/DL — HIGH (ref 7–23)
CALCIUM SERPL-MCNC: 7.9 MG/DL — LOW (ref 8.4–10.5)
CALCIUM SERPL-MCNC: 7.9 MG/DL — LOW (ref 8.4–10.5)
CHLORIDE SERPL-SCNC: 98 MMOL/L — SIGNIFICANT CHANGE UP (ref 96–108)
CHLORIDE SERPL-SCNC: 98 MMOL/L — SIGNIFICANT CHANGE UP (ref 96–108)
CO2 SERPL-SCNC: 22 MMOL/L — SIGNIFICANT CHANGE UP (ref 22–31)
CO2 SERPL-SCNC: 22 MMOL/L — SIGNIFICANT CHANGE UP (ref 22–31)
CREAT SERPL-MCNC: 7.51 MG/DL — HIGH (ref 0.5–1.3)
CREAT SERPL-MCNC: 7.51 MG/DL — HIGH (ref 0.5–1.3)
EGFR: 9 ML/MIN/1.73M2 — LOW
EGFR: 9 ML/MIN/1.73M2 — LOW
GLUCOSE BLDC GLUCOMTR-MCNC: 180 MG/DL — HIGH (ref 70–99)
GLUCOSE BLDC GLUCOMTR-MCNC: 180 MG/DL — HIGH (ref 70–99)
GLUCOSE SERPL-MCNC: 191 MG/DL — HIGH (ref 70–99)
GLUCOSE SERPL-MCNC: 191 MG/DL — HIGH (ref 70–99)
HCT VFR BLD CALC: 24.7 % — LOW (ref 39–50)
HCT VFR BLD CALC: 24.7 % — LOW (ref 39–50)
HGB BLD-MCNC: 7.8 G/DL — LOW (ref 13–17)
HGB BLD-MCNC: 7.8 G/DL — LOW (ref 13–17)
MCHC RBC-ENTMCNC: 22.5 PG — LOW (ref 27–34)
MCHC RBC-ENTMCNC: 22.5 PG — LOW (ref 27–34)
MCHC RBC-ENTMCNC: 31.6 GM/DL — LOW (ref 32–36)
MCHC RBC-ENTMCNC: 31.6 GM/DL — LOW (ref 32–36)
MCV RBC AUTO: 71.4 FL — LOW (ref 80–100)
MCV RBC AUTO: 71.4 FL — LOW (ref 80–100)
NRBC # BLD: 0 /100 WBCS — SIGNIFICANT CHANGE UP (ref 0–0)
NRBC # BLD: 0 /100 WBCS — SIGNIFICANT CHANGE UP (ref 0–0)
PLATELET # BLD AUTO: 202 K/UL — SIGNIFICANT CHANGE UP (ref 150–400)
PLATELET # BLD AUTO: 202 K/UL — SIGNIFICANT CHANGE UP (ref 150–400)
POTASSIUM SERPL-MCNC: 4.2 MMOL/L — SIGNIFICANT CHANGE UP (ref 3.5–5.3)
POTASSIUM SERPL-MCNC: 4.2 MMOL/L — SIGNIFICANT CHANGE UP (ref 3.5–5.3)
POTASSIUM SERPL-SCNC: 4.2 MMOL/L — SIGNIFICANT CHANGE UP (ref 3.5–5.3)
POTASSIUM SERPL-SCNC: 4.2 MMOL/L — SIGNIFICANT CHANGE UP (ref 3.5–5.3)
RBC # BLD: 3.46 M/UL — LOW (ref 4.2–5.8)
RBC # BLD: 3.46 M/UL — LOW (ref 4.2–5.8)
RBC # FLD: 15.5 % — HIGH (ref 10.3–14.5)
RBC # FLD: 15.5 % — HIGH (ref 10.3–14.5)
SODIUM SERPL-SCNC: 135 MMOL/L — SIGNIFICANT CHANGE UP (ref 135–145)
SODIUM SERPL-SCNC: 135 MMOL/L — SIGNIFICANT CHANGE UP (ref 135–145)
WBC # BLD: 9.78 K/UL — SIGNIFICANT CHANGE UP (ref 3.8–10.5)
WBC # BLD: 9.78 K/UL — SIGNIFICANT CHANGE UP (ref 3.8–10.5)
WBC # FLD AUTO: 9.78 K/UL — SIGNIFICANT CHANGE UP (ref 3.8–10.5)
WBC # FLD AUTO: 9.78 K/UL — SIGNIFICANT CHANGE UP (ref 3.8–10.5)

## 2023-11-28 PROCEDURE — 49324 LAP INSERT TUNNEL IP CATH: CPT

## 2023-11-28 PROCEDURE — C9399: CPT

## 2023-11-28 PROCEDURE — 86901 BLOOD TYPING SEROLOGIC RH(D): CPT

## 2023-11-28 PROCEDURE — 82962 GLUCOSE BLOOD TEST: CPT

## 2023-11-28 PROCEDURE — 82435 ASSAY OF BLOOD CHLORIDE: CPT

## 2023-11-28 PROCEDURE — 86850 RBC ANTIBODY SCREEN: CPT

## 2023-11-28 PROCEDURE — 36415 COLL VENOUS BLD VENIPUNCTURE: CPT

## 2023-11-28 PROCEDURE — 83605 ASSAY OF LACTIC ACID: CPT

## 2023-11-28 PROCEDURE — 85018 HEMOGLOBIN: CPT

## 2023-11-28 PROCEDURE — C1750: CPT

## 2023-11-28 PROCEDURE — 85014 HEMATOCRIT: CPT

## 2023-11-28 PROCEDURE — 86900 BLOOD TYPING SEROLOGIC ABO: CPT

## 2023-11-28 PROCEDURE — 84295 ASSAY OF SERUM SODIUM: CPT

## 2023-11-28 PROCEDURE — 82803 BLOOD GASES ANY COMBINATION: CPT

## 2023-11-28 PROCEDURE — 84132 ASSAY OF SERUM POTASSIUM: CPT

## 2023-11-28 PROCEDURE — 80048 BASIC METABOLIC PNL TOTAL CA: CPT

## 2023-11-28 PROCEDURE — 82330 ASSAY OF CALCIUM: CPT

## 2023-11-28 PROCEDURE — 85027 COMPLETE CBC AUTOMATED: CPT

## 2023-11-28 PROCEDURE — 82947 ASSAY GLUCOSE BLOOD QUANT: CPT

## 2023-11-28 RX ADMIN — CARVEDILOL PHOSPHATE 6.25 MILLIGRAM(S): 80 CAPSULE, EXTENDED RELEASE ORAL at 08:12

## 2023-11-28 RX ADMIN — SODIUM CHLORIDE 3 MILLILITER(S): 9 INJECTION INTRAMUSCULAR; INTRAVENOUS; SUBCUTANEOUS at 08:01

## 2023-11-28 RX ADMIN — SODIUM CHLORIDE 3 MILLILITER(S): 9 INJECTION INTRAMUSCULAR; INTRAVENOUS; SUBCUTANEOUS at 00:00

## 2023-11-28 RX ADMIN — Medication 1: at 07:00

## 2023-11-28 RX ADMIN — Medication 650 MILLIGRAM(S): at 08:11

## 2023-11-28 RX ADMIN — Medication 1334 MILLIGRAM(S): at 08:12

## 2023-11-28 RX ADMIN — Medication 80 MILLIGRAM(S): at 08:12

## 2023-12-05 ENCOUNTER — NON-APPOINTMENT (OUTPATIENT)
Age: 43
End: 2023-12-05

## 2023-12-06 ENCOUNTER — APPOINTMENT (OUTPATIENT)
Dept: NEPHROLOGY | Facility: CLINIC | Age: 43
End: 2023-12-06
Payer: COMMERCIAL

## 2023-12-06 LAB
25(OH)D3 SERPL-MCNC: 12.2 NG/ML
ALBUMIN SERPL ELPH-MCNC: 3.5 G/DL
ANION GAP SERPL CALC-SCNC: 17 MMOL/L
APPEARANCE: CLEAR
BACTERIA: NEGATIVE /HPF
BILIRUBIN URINE: NEGATIVE
BLOOD URINE: ABNORMAL
BUN SERPL-MCNC: 98 MG/DL
CALCIUM SERPL-MCNC: 8.2 MG/DL
CALCIUM SERPL-MCNC: 8.2 MG/DL
CAST: 1 /LPF
CHLORIDE SERPL-SCNC: 99 MMOL/L
CO2 SERPL-SCNC: 22 MMOL/L
COLOR: YELLOW
CREAT SERPL-MCNC: 7.24 MG/DL
CREAT SPEC-SCNC: 40 MG/DL
EGFR: 9 ML/MIN/1.73M2
EPITHELIAL CELLS: 0 /HPF
GLUCOSE QUALITATIVE U: 100 MG/DL
GLUCOSE SERPL-MCNC: 171 MG/DL
HCT VFR BLD CALC: 24.5 %
HGB BLD-MCNC: 7.8 G/DL
KETONES URINE: NEGATIVE MG/DL
LEUKOCYTE ESTERASE URINE: NEGATIVE
MAGNESIUM SERPL-MCNC: 1.4 MG/DL
MCHC RBC-ENTMCNC: 23.6 PG
MCHC RBC-ENTMCNC: 31.8 GM/DL
MCV RBC AUTO: 74 FL
MICROALBUMIN 24H UR DL<=1MG/L-MCNC: 204.9 MG/DL
MICROALBUMIN/CREAT 24H UR-RTO: 5152 MG/G
MICROSCOPIC-UA: NORMAL
NITRITE URINE: NEGATIVE
PARATHYROID HORMONE INTACT: 245 PG/ML
PH URINE: 6.5
PHOSPHATE SERPL-MCNC: 6 MG/DL
PLATELET # BLD AUTO: 242 K/UL
POTASSIUM SERPL-SCNC: 4.4 MMOL/L
PROTEIN URINE: 300 MG/DL
RBC # BLD: 3.31 M/UL
RBC # FLD: 15.6 %
RED BLOOD CELLS URINE: 6 /HPF
SODIUM SERPL-SCNC: 137 MMOL/L
SPECIFIC GRAVITY URINE: 1.01
UROBILINOGEN URINE: 0.2 MG/DL
WBC # FLD AUTO: 7.37 K/UL
WHITE BLOOD CELLS URINE: 1 /HPF

## 2023-12-06 PROCEDURE — 96372 THER/PROPH/DIAG INJ SC/IM: CPT

## 2023-12-06 RX ORDER — ERYTHROPOIETIN 20000 [IU]/ML
20000 INJECTION, SOLUTION INTRAVENOUS; SUBCUTANEOUS
Qty: 1 | Refills: 0 | Status: COMPLETED | OUTPATIENT
Start: 2023-12-06

## 2023-12-06 RX ADMIN — ERYTHROPOIETIN 1 UNIT/ML: 20000 INJECTION, SOLUTION INTRAVENOUS; SUBCUTANEOUS at 00:00

## 2023-12-07 LAB
HBV CORE IGG+IGM SER QL: NONREACTIVE
HBV SURFACE AB SER QL: REACTIVE
HBV SURFACE AB SERPL IA-ACNC: 130.1 MIU/ML
HBV SURFACE AG SER QL: NONREACTIVE

## 2023-12-10 LAB
M TB IFN-G BLD-IMP: NEGATIVE
QUANTIFERON TB PLUS MITOGEN MINUS NIL: 0.64 IU/ML
QUANTIFERON TB PLUS NIL: 0.02 IU/ML
QUANTIFERON TB PLUS TB1 MINUS NIL: 0.02 IU/ML
QUANTIFERON TB PLUS TB2 MINUS NIL: 0.13 IU/ML

## 2023-12-13 NOTE — PATIENT PROFILE ADULT - FALL HARM RISK - FACTORS
DISPLAY PLAN FREE TEXT DISPLAY PLAN FREE TEXT DISPLAY PLAN FREE TEXT DISPLAY PLAN FREE TEXT DISPLAY PLAN FREE TEXT DISPLAY PLAN FREE TEXT DISPLAY PLAN FREE TEXT DISPLAY PLAN FREE TEXT DISPLAY PLAN FREE TEXT DISPLAY PLAN FREE TEXT DISPLAY PLAN FREE TEXT DISPLAY PLAN FREE TEXT DISPLAY PLAN FREE TEXT Other medical problems/Weakness/Other

## 2023-12-14 ENCOUNTER — APPOINTMENT (OUTPATIENT)
Dept: NEPHROLOGY | Facility: CLINIC | Age: 43
End: 2023-12-14

## 2023-12-17 ENCOUNTER — TRANSCRIPTION ENCOUNTER (OUTPATIENT)
Age: 43
End: 2023-12-17

## 2023-12-18 ENCOUNTER — APPOINTMENT (OUTPATIENT)
Dept: NEPHROLOGY | Facility: CLINIC | Age: 43
End: 2023-12-18
Payer: COMMERCIAL

## 2023-12-18 VITALS
OXYGEN SATURATION: 100 % | HEART RATE: 97 BPM | RESPIRATION RATE: 18 BRPM | TEMPERATURE: 97.2 F | WEIGHT: 251.1 LBS | SYSTOLIC BLOOD PRESSURE: 138 MMHG | DIASTOLIC BLOOD PRESSURE: 91 MMHG | BODY MASS INDEX: 32.23 KG/M2 | HEIGHT: 74 IN

## 2023-12-18 DIAGNOSIS — D63.1 CHRONIC KIDNEY DISEASE, UNSPECIFIED: ICD-10-CM

## 2023-12-18 DIAGNOSIS — N18.9 CHRONIC KIDNEY DISEASE, UNSPECIFIED: ICD-10-CM

## 2023-12-18 PROCEDURE — 96372 THER/PROPH/DIAG INJ SC/IM: CPT

## 2023-12-18 RX ORDER — ERYTHROPOIETIN 20000 [IU]/ML
20000 INJECTION, SOLUTION INTRAVENOUS; SUBCUTANEOUS
Qty: 1 | Refills: 0 | Status: COMPLETED | OUTPATIENT
Start: 2023-12-18

## 2023-12-18 RX ADMIN — ERYTHROPOIETIN 1 UNIT/ML: 20000 INJECTION, SOLUTION INTRAVENOUS; SUBCUTANEOUS at 00:00

## 2023-12-18 NOTE — PROCEDURE
[FreeTextEntry1] : Hgb 7.8 /Hct 24.5 BP acceptable Procrit SQ x 1 Pt tolerated procedure well. Weight up- advised pt take extra dose of furosemide 80mg today.  Labs today. Will call with further instructions tomorrow.

## 2023-12-19 ENCOUNTER — APPOINTMENT (OUTPATIENT)
Dept: CT IMAGING | Facility: IMAGING CENTER | Age: 43
End: 2023-12-19
Payer: COMMERCIAL

## 2023-12-19 ENCOUNTER — OUTPATIENT (OUTPATIENT)
Dept: OUTPATIENT SERVICES | Facility: HOSPITAL | Age: 43
LOS: 1 days | End: 2023-12-19
Payer: COMMERCIAL

## 2023-12-19 DIAGNOSIS — Z01.818 ENCOUNTER FOR OTHER PREPROCEDURAL EXAMINATION: ICD-10-CM

## 2023-12-19 DIAGNOSIS — Z98.890 OTHER SPECIFIED POSTPROCEDURAL STATES: Chronic | ICD-10-CM

## 2023-12-19 LAB
ALBUMIN SERPL ELPH-MCNC: 3.5 G/DL
ANION GAP SERPL CALC-SCNC: 17 MMOL/L
BUN SERPL-MCNC: 95 MG/DL
CALCIUM SERPL-MCNC: 7.2 MG/DL
CHLORIDE SERPL-SCNC: 97 MMOL/L
CO2 SERPL-SCNC: 22 MMOL/L
CREAT SERPL-MCNC: 8.84 MG/DL
EGFR: 7 ML/MIN/1.73M2
FERRITIN SERPL-MCNC: 407 NG/ML
GLUCOSE SERPL-MCNC: 153 MG/DL
HCT VFR BLD CALC: 23.7 %
HGB BLD-MCNC: 7.5 G/DL
IRON SATN MFR SERPL: 16 %
IRON SERPL-MCNC: 46 UG/DL
MCHC RBC-ENTMCNC: 23.2 PG
MCHC RBC-ENTMCNC: 31.6 GM/DL
MCV RBC AUTO: 73.4 FL
PHOSPHATE SERPL-MCNC: 8.2 MG/DL
PLATELET # BLD AUTO: 215 K/UL
POTASSIUM SERPL-SCNC: 4.4 MMOL/L
RBC # BLD: 3.23 M/UL
RBC # FLD: 15.7 %
SODIUM SERPL-SCNC: 136 MMOL/L
TIBC SERPL-MCNC: 284 UG/DL
UIBC SERPL-MCNC: 238 UG/DL
WBC # FLD AUTO: 7.6 K/UL

## 2023-12-19 PROCEDURE — 71250 CT THORAX DX C-: CPT | Mod: 26

## 2023-12-19 PROCEDURE — 71250 CT THORAX DX C-: CPT

## 2024-01-04 ENCOUNTER — NON-APPOINTMENT (OUTPATIENT)
Age: 44
End: 2024-01-04

## 2024-01-15 NOTE — ED PROVIDER NOTE - PROGRESS NOTE
Child has had a cold and congestion for over one week. Last Friday, acquired a fever greater than 101. Child also has a sore throat. Office appointment made.   
Stable.

## 2024-01-16 RX ORDER — TORSEMIDE 20 MG/1
20 TABLET ORAL
Qty: 720 | Refills: 3 | Status: ACTIVE | COMMUNITY
Start: 2023-03-14 | End: 1900-01-01

## 2024-01-17 ENCOUNTER — APPOINTMENT (OUTPATIENT)
Dept: ENDOCRINOLOGY | Facility: CLINIC | Age: 44
End: 2024-01-17
Payer: COMMERCIAL

## 2024-01-17 VITALS
HEIGHT: 74 IN | BODY MASS INDEX: 32.08 KG/M2 | HEART RATE: 98 BPM | SYSTOLIC BLOOD PRESSURE: 131 MMHG | OXYGEN SATURATION: 100 % | TEMPERATURE: 97.9 F | DIASTOLIC BLOOD PRESSURE: 86 MMHG | WEIGHT: 250 LBS

## 2024-01-17 LAB
GLUCOSE BLDC GLUCOMTR-MCNC: 150
HBA1C MFR BLD HPLC: 8.8

## 2024-01-17 PROCEDURE — 99214 OFFICE O/P EST MOD 30 MIN: CPT

## 2024-01-17 PROCEDURE — G2211 COMPLEX E/M VISIT ADD ON: CPT

## 2024-01-17 PROCEDURE — 83036 HEMOGLOBIN GLYCOSYLATED A1C: CPT | Mod: QW

## 2024-01-17 PROCEDURE — 82962 GLUCOSE BLOOD TEST: CPT

## 2024-01-17 NOTE — HISTORY OF PRESENT ILLNESS
[FreeTextEntry1] :  Since last visit, patient started on dialysis 3 weeks ago   #Diabetes Mellitus Type 2   Diagnosis- as a teenager - started on MFM at the time  Current regimen: Januvia 25mg daily (on since 10 years) +stopped farxiga at initiation of dialysis lantus 10 units everning  (started on month ago)   Other diabetic medications discontinued in the past: 1. MFM                            Reason for discontinuation: Stopped  due to kidney function.  2. Farxiga    Reason: started HD  PCP/prior endocrinologist: PCP.  Signs/symptoms: Denies  Last HgbA1c: 8.4% in March 2023-->10.0% in June 2023-->8.3% in July 2023-->8.8% in Jan 2024 Home blood sugars: free style сергей [place]- 170-180- 200s Hypoglycemia: Denies   FH of diabetes: mother  History of CAD: denies  History of CVA: Denies   Diet:  Breakfast: cereal, turkey/egg lunch: salad from [employer], cava  dinner: rice and veggies, beef/chicken   eGFR: 19    Alb/creat: ++ Follow with nephrology? yes, Dr. Paniagua   Last eye exam: 1 year ago  Evidence of retinopathy? Yes, was told he has retinopathy. had laser surgery   Last foot exam: denies - 3-4 years ago  Any issues? does have numbness/tingling   Social History:  Alcohol: on the weekends occasionally  Tobacco: denies Work: IT for [employer]   Surgical history: cyst removal in ankle   #Hyperlipidemia  Currently on statin: Rosuvastain 10mg  LDL 74

## 2024-01-17 NOTE — ASSESSMENT
[FreeTextEntry1] : #Type 2 diabetes -Patient with longstanding history of type 2 diabetes since he was a teenager.  -Patient started on dialysis 3 weeks ago -I reviewed and analyzed his freestyle сергей data.  His average blood glucose is 160.  Blood glucose throughout the day ranges between 125 and 184.  Blood sugars are mostly spiking after 6 PM.  His time in range is 76% high 21%, very high 3%, low 0% Plan: -Agree with holding Farxiga in the setting of initiation of hemodialysis -Increase Lantus to 12 units daily given some fasting hyperglycemia-also advised to take the Lantus at 10 PM since his PD starts at midnight -Continue with Januvia 25 mg daily -Advised to cut down on the carbs later in the day - patient is due for Optho and podiatry exams  # Hyperlipidemia -Continue with statin

## 2024-02-02 ENCOUNTER — APPOINTMENT (OUTPATIENT)
Dept: INTERNAL MEDICINE | Facility: CLINIC | Age: 44
End: 2024-02-02

## 2024-02-06 LAB
25(OH)D3 SERPL-MCNC: 19.3 NG/ML
ALBUMIN SERPL ELPH-MCNC: 4 G/DL
ALP BLD-CCNC: 71 U/L
ALT SERPL-CCNC: 18 U/L
ANION GAP SERPL CALC-SCNC: 17 MMOL/L
AST SERPL-CCNC: 17 U/L
BASOPHILS # BLD AUTO: 0.08 K/UL
BASOPHILS NFR BLD AUTO: 0.9 %
BILIRUB SERPL-MCNC: 0.2 MG/DL
BUN SERPL-MCNC: 85 MG/DL
CALCIUM SERPL-MCNC: 7.5 MG/DL
CHLORIDE SERPL-SCNC: 95 MMOL/L
CHOLEST SERPL-MCNC: 139 MG/DL
CO2 SERPL-SCNC: 23 MMOL/L
CREAT SERPL-MCNC: 9.45 MG/DL
EGFR: 7 ML/MIN/1.73M2
EOSINOPHIL # BLD AUTO: 0.52 K/UL
EOSINOPHIL NFR BLD AUTO: 6.1 %
ESTIMATED AVERAGE GLUCOSE: 134 MG/DL
GLUCOSE SERPL-MCNC: 107 MG/DL
HBA1C MFR BLD HPLC: 6.3 %
HCT VFR BLD CALC: 24.5 %
HDLC SERPL-MCNC: 35 MG/DL
HGB BLD-MCNC: 7.8 G/DL
IMM GRANULOCYTES NFR BLD AUTO: 0.2 %
LDLC SERPL CALC-MCNC: 74 MG/DL
LYMPHOCYTES # BLD AUTO: 1.32 K/UL
LYMPHOCYTES NFR BLD AUTO: 15.4 %
MAN DIFF?: NORMAL
MCHC RBC-ENTMCNC: 23.2 PG
MCHC RBC-ENTMCNC: 31.8 GM/DL
MCV RBC AUTO: 72.9 FL
MONOCYTES # BLD AUTO: 0.64 K/UL
MONOCYTES NFR BLD AUTO: 7.5 %
NEUTROPHILS # BLD AUTO: 5.97 K/UL
NEUTROPHILS NFR BLD AUTO: 69.9 %
NONHDLC SERPL-MCNC: 104 MG/DL
PLATELET # BLD AUTO: 250 K/UL
POTASSIUM SERPL-SCNC: 4.8 MMOL/L
PROT SERPL-MCNC: 6.6 G/DL
RBC # BLD: 3.36 M/UL
RBC # FLD: 15.1 %
SODIUM SERPL-SCNC: 135 MMOL/L
T4 FREE SERPL-MCNC: 1.3 NG/DL
TRIGL SERPL-MCNC: 176 MG/DL
TSH SERPL-ACNC: 2.83 UIU/ML
WBC # FLD AUTO: 8.55 K/UL

## 2024-02-09 ENCOUNTER — APPOINTMENT (OUTPATIENT)
Dept: INTERNAL MEDICINE | Facility: CLINIC | Age: 44
End: 2024-02-09
Payer: COMMERCIAL

## 2024-02-09 ENCOUNTER — APPOINTMENT (OUTPATIENT)
Dept: ENDOCRINOLOGY | Facility: CLINIC | Age: 44
End: 2024-02-09

## 2024-02-09 ENCOUNTER — NON-APPOINTMENT (OUTPATIENT)
Age: 44
End: 2024-02-09

## 2024-02-09 VITALS
DIASTOLIC BLOOD PRESSURE: 92 MMHG | HEART RATE: 99 BPM | SYSTOLIC BLOOD PRESSURE: 145 MMHG | HEIGHT: 74 IN | OXYGEN SATURATION: 98 % | TEMPERATURE: 98.7 F | BODY MASS INDEX: 31.95 KG/M2 | WEIGHT: 249 LBS

## 2024-02-09 DIAGNOSIS — E83.39 OTHER DISORDERS OF PHOSPHORUS METABOLISM: ICD-10-CM

## 2024-02-09 DIAGNOSIS — E11.9 TYPE 2 DIABETES MELLITUS W/OUT COMPLICATIONS: ICD-10-CM

## 2024-02-09 DIAGNOSIS — R80.9 TYPE 2 DIABETES MELLITUS WITH OTHER DIABETIC KIDNEY COMPLICATION: ICD-10-CM

## 2024-02-09 DIAGNOSIS — Z01.818 ENCOUNTER FOR OTHER PREPROCEDURAL EXAMINATION: ICD-10-CM

## 2024-02-09 DIAGNOSIS — L97.919 NON-PRESSURE CHRONIC ULCER OF UNSPECIFIED PART OF RIGHT LOWER LEG WITH UNSPECIFIED SEVERITY: ICD-10-CM

## 2024-02-09 DIAGNOSIS — Z22.7 LATENT TUBERCULOSIS: ICD-10-CM

## 2024-02-09 DIAGNOSIS — E11.29 TYPE 2 DIABETES MELLITUS WITH OTHER DIABETIC KIDNEY COMPLICATION: ICD-10-CM

## 2024-02-09 DIAGNOSIS — I10 ESSENTIAL (PRIMARY) HYPERTENSION: ICD-10-CM

## 2024-02-09 DIAGNOSIS — E11.40 TYPE 2 DIABETES MELLITUS WITH DIABETIC NEUROPATHY, UNSPECIFIED: ICD-10-CM

## 2024-02-09 DIAGNOSIS — E78.5 HYPERLIPIDEMIA, UNSPECIFIED: ICD-10-CM

## 2024-02-09 DIAGNOSIS — E11.3599 TYPE 2 DIABETES MELLITUS WITH PROLIFERATIVE DIABETIC RETINOPATHY WITHOUT MACULAR EDEMA, UNSPECIFIED EYE: ICD-10-CM

## 2024-02-09 DIAGNOSIS — E11.49 TYPE 2 DIABETES MELLITUS WITH DIABETIC NEUROPATHY, UNSPECIFIED: ICD-10-CM

## 2024-02-09 PROCEDURE — G0444 DEPRESSION SCREEN ANNUAL: CPT | Mod: 59

## 2024-02-09 PROCEDURE — 99213 OFFICE O/P EST LOW 20 MIN: CPT | Mod: 25

## 2024-02-09 PROCEDURE — 99396 PREV VISIT EST AGE 40-64: CPT

## 2024-02-09 PROCEDURE — G2211 COMPLEX E/M VISIT ADD ON: CPT | Mod: NC

## 2024-02-09 PROCEDURE — 93000 ELECTROCARDIOGRAM COMPLETE: CPT | Mod: 59

## 2024-02-09 RX ORDER — BLOOD-GLUCOSE SENSOR
EACH MISCELLANEOUS
Qty: 2 | Refills: 5 | Status: ACTIVE | COMMUNITY
Start: 2024-02-09 | End: 1900-01-01

## 2024-02-09 RX ORDER — SODIUM BICARBONATE 650 MG/1
650 TABLET ORAL
Qty: 360 | Refills: 3 | Status: DISCONTINUED | COMMUNITY
Start: 2023-06-15 | End: 2024-02-09

## 2024-02-09 RX ORDER — BLOOD-GLUCOSE,RECEIVER,CONT
EACH MISCELLANEOUS
Qty: 2 | Refills: 5 | Status: ACTIVE | COMMUNITY
Start: 2024-02-09 | End: 1900-01-01

## 2024-02-09 RX ORDER — INSULIN GLARGINE-YFGN 100 [IU]/ML
100 INJECTION, SOLUTION SUBCUTANEOUS
Qty: 1 | Refills: 5 | Status: ACTIVE | COMMUNITY
Start: 2023-06-08 | End: 1900-01-01

## 2024-02-09 NOTE — PHYSICAL EXAM
[Normal] : soft, non-tender, non-distended, no masses palpated, no HSM and normal bowel sounds [de-identified] : bilateral wounds on shins - wrapped [de-identified] : proprioception impaired, sensation diminished in bilateral feet

## 2024-02-09 NOTE — HEALTH RISK ASSESSMENT
[Good] : ~his/her~  mood as  good [Monthly or less (1 pt)] : Monthly or less (1 point) [1 or 2 (0 pts)] : 1 or 2 (0 points) [Never (0 pts)] : Never (0 points) [No] : In the past 12 months have you used drugs other than those required for medical reasons? No [No falls in past year] : Patient reported no falls in the past year [0] : 2) Feeling down, depressed, or hopeless: Not at all (0) [PHQ-2 Negative - No further assessment needed] : PHQ-2 Negative - No further assessment needed [HIV Test offered] : HIV Test offered [Hepatitis C test offered] : Hepatitis C test offered [None] : None [Alone] : lives alone [Employed] : employed [Single] : single [Fully functional (bathing, dressing, toileting, transferring, walking, feeding)] : Fully functional (bathing, dressing, toileting, transferring, walking, feeding) [Fully functional (using the telephone, shopping, preparing meals, housekeeping, doing laundry, using] : Fully functional and needs no help or supervision to perform IADLs (using the telephone, shopping, preparing meals, housekeeping, doing laundry, using transportation, managing medications and managing finances) [Reports changes in vision] : Reports changes in vision [Never] : Never [de-identified] : sedentary  [de-identified] : takeout - unhealthy  [FOX5Hpqet] : 0 [Change in mental status noted] : No change in mental status noted [Language] : denies difficulty with language [Handling Complex Tasks] : denies difficulty handling complex tasks [Reports changes in hearing] : Reports no changes in hearing [Reports changes in dental health] : Reports no changes in dental health [Smoke Detector] : no smoke detector [Safety elements used in home] : no safety elements used in home [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

## 2024-02-09 NOTE — HISTORY OF PRESENT ILLNESS
[FreeTextEntry1] : Patient presents for comprehensive medical evaluation today.  Patient presents today for follow-up of chronic medical conditions.  [de-identified] : PMHx Type II DM, HLD, BABIN, HTN, ESRD (on PD)   Ophtho- Dr. Carty - retinopathy - receiving injections  Endocrine - Dr. Ruiz  Nephrology - Dr. Paniagua   Transplant eval completed - patient has been officially listed on transplant list!  Started PD as of 1/2024 - going well overall - starts each evening at midnight Weight and swelling have fluctuated - but overall patient feels well with no acute complaints Saw Dr. Ruiz - A1C improved - semglee adjusted to 12U - going well thus far  Sees nephrology monthly  Completed ID clearance  Prior -  Reports that since starting torsemide has not noticed any change in urination - and feels his ankles remain swollen. Has 10-15 lb weight fluctuations - will need increase in diuretic.  Additionally patient compliant with insulin since last visit with interval decrease in A1C 10 > 8% Denies hypoglycemic episodes   Otherwise feels well

## 2024-02-09 NOTE — ASSESSMENT
[FreeTextEntry1] : #ESRD on PD (as of 1/2024) pending transplant Follows with nephrology - Dr. Rani Paniagua  BP and diabetic control stressed to patient  Torsemide and carvedilol per nephrology  Officially on transplant list as of 1/4/24 - monthly rogosin requirement  Needs to f/u annually with transplant center - also notify of any changes in health or hospitalizations  #Type II DM uncontrolled  ophtho - UTD - early retinopathy 2/2023 - will schedule f/u visit  current regimen - januvia, semglee 12U (jardiance d/c and MFM d/c as patient on active PD)  #Wound care  nonhealing wounds on bilateral shins related to uncontrolled diabetes  wound care specialist following - [facility]  no wounds currently  [ ] podiatry exam  [ ] CT chest ordered for 4/2024 to f/u recent findings of pulmonary nodules recommended for 3 month f/u

## 2024-02-12 ENCOUNTER — APPOINTMENT (OUTPATIENT)
Dept: NEPHROLOGY | Facility: CLINIC | Age: 44
End: 2024-02-12

## 2024-02-12 DIAGNOSIS — N18.5 CHRONIC KIDNEY DISEASE, STAGE 5: ICD-10-CM

## 2024-02-12 DIAGNOSIS — N18.6 END STAGE RENAL DISEASE: ICD-10-CM

## 2024-02-12 DIAGNOSIS — Z99.2 END STAGE RENAL DISEASE: ICD-10-CM

## 2024-02-12 RX ORDER — CARVEDILOL 6.25 MG/1
6.25 TABLET, FILM COATED ORAL TWICE DAILY
Qty: 180 | Refills: 3 | Status: ACTIVE | COMMUNITY
Start: 2023-09-08 | End: 1900-01-01

## 2024-02-12 RX ORDER — CALCIUM ACETATE 667 MG
667 TABLET ORAL 3 TIMES DAILY
Qty: 1080 | Refills: 3 | Status: ACTIVE | COMMUNITY
Start: 2023-06-20 | End: 1900-01-01

## 2024-02-14 ENCOUNTER — NON-APPOINTMENT (OUTPATIENT)
Age: 44
End: 2024-02-14

## 2024-03-07 NOTE — H&P ADULT. - FAMILY HISTORY
To get better and follow your care plan as instructed. No pertinent family history in first degree relatives

## 2024-03-11 ENCOUNTER — APPOINTMENT (OUTPATIENT)
Dept: NEPHROLOGY | Facility: CLINIC | Age: 44
End: 2024-03-11

## 2024-04-05 RX ORDER — GENTAMICIN SULFATE 1 MG/G
0.1 CREAM TOPICAL
Qty: 2 | Refills: 12 | Status: ACTIVE | COMMUNITY
Start: 2023-11-16 | End: 1900-01-01

## 2024-04-11 RX ORDER — ROSUVASTATIN CALCIUM 10 MG/1
10 TABLET, FILM COATED ORAL
Qty: 90 | Refills: 3 | Status: ACTIVE | COMMUNITY
Start: 2023-03-10 | End: 1900-01-01

## 2024-04-15 ENCOUNTER — APPOINTMENT (OUTPATIENT)
Dept: NEPHROLOGY | Facility: CLINIC | Age: 44
End: 2024-04-15

## 2024-04-21 ENCOUNTER — RX RENEWAL (OUTPATIENT)
Age: 44
End: 2024-04-21

## 2024-05-16 ENCOUNTER — APPOINTMENT (OUTPATIENT)
Dept: ENDOCRINOLOGY | Facility: CLINIC | Age: 44
End: 2024-05-16

## 2024-05-20 ENCOUNTER — APPOINTMENT (OUTPATIENT)
Dept: NEPHROLOGY | Facility: CLINIC | Age: 44
End: 2024-05-20

## 2024-06-10 ENCOUNTER — APPOINTMENT (OUTPATIENT)
Dept: NEPHROLOGY | Facility: CLINIC | Age: 44
End: 2024-06-10

## 2024-06-11 RX ORDER — LANTHANUM CARBONATE 750 MG/1
750 TABLET, CHEWABLE ORAL 3 TIMES DAILY
Qty: 270 | Refills: 3 | Status: ACTIVE | COMMUNITY
Start: 2024-06-11 | End: 1900-01-01

## 2024-07-10 ENCOUNTER — APPOINTMENT (OUTPATIENT)
Dept: ENDOCRINOLOGY | Facility: CLINIC | Age: 44
End: 2024-07-10
Payer: COMMERCIAL

## 2024-07-10 DIAGNOSIS — E11.9 TYPE 2 DIABETES MELLITUS W/OUT COMPLICATIONS: ICD-10-CM

## 2024-07-10 DIAGNOSIS — E78.5 HYPERLIPIDEMIA, UNSPECIFIED: ICD-10-CM

## 2024-07-10 LAB — GLUCOSE BLDC GLUCOMTR-MCNC: 224

## 2024-07-10 PROCEDURE — 99214 OFFICE O/P EST MOD 30 MIN: CPT

## 2024-07-10 PROCEDURE — 82962 GLUCOSE BLOOD TEST: CPT

## 2024-07-10 PROCEDURE — 83036 HEMOGLOBIN GLYCOSYLATED A1C: CPT | Mod: QW

## 2024-07-10 PROCEDURE — 95251 CONT GLUC MNTR ANALYSIS I&R: CPT

## 2024-07-19 NOTE — PATIENT PROFILE ADULT - FUNCTIONAL ASSESSMENT - DAILY ACTIVITY 1.
Nurses Note -- 4 Eyes      7/18/2024   7:37 PM      Skin assessed during: Admit      [] No Altered Skin Integrity Present    []Prevention Measures Documented      [x] Yes- Altered Skin Integrity Present or Discovered   [x] LDA Added if Not in Epic (Describe Wound)   [x] New Altered Skin Integrity was Present on Admit and Documented in LDA   [x] Wound Image Taken    Wound Care Consulted? Yes    Attending Nurse:  Shy Reich RN/Staff Member:  Bill            3 = A little assistance

## 2024-07-22 ENCOUNTER — APPOINTMENT (OUTPATIENT)
Dept: NEPHROLOGY | Facility: CLINIC | Age: 44
End: 2024-07-22

## 2024-07-29 ENCOUNTER — APPOINTMENT (OUTPATIENT)
Dept: INTERNAL MEDICINE | Facility: CLINIC | Age: 44
End: 2024-07-29
Payer: COMMERCIAL

## 2024-07-29 VITALS
TEMPERATURE: 98.1 F | BODY MASS INDEX: 32.85 KG/M2 | WEIGHT: 256 LBS | DIASTOLIC BLOOD PRESSURE: 109 MMHG | HEART RATE: 74 BPM | SYSTOLIC BLOOD PRESSURE: 181 MMHG | HEIGHT: 74 IN | OXYGEN SATURATION: 99 %

## 2024-07-29 DIAGNOSIS — E83.39 OTHER DISORDERS OF PHOSPHORUS METABOLISM: ICD-10-CM

## 2024-07-29 DIAGNOSIS — E11.9 TYPE 2 DIABETES MELLITUS W/OUT COMPLICATIONS: ICD-10-CM

## 2024-07-29 DIAGNOSIS — N18.9 CHRONIC KIDNEY DISEASE, UNSPECIFIED: ICD-10-CM

## 2024-07-29 DIAGNOSIS — R80.9 TYPE 2 DIABETES MELLITUS WITH OTHER DIABETIC KIDNEY COMPLICATION: ICD-10-CM

## 2024-07-29 DIAGNOSIS — D63.1 CHRONIC KIDNEY DISEASE, UNSPECIFIED: ICD-10-CM

## 2024-07-29 DIAGNOSIS — E11.49 TYPE 2 DIABETES MELLITUS WITH DIABETIC NEUROPATHY, UNSPECIFIED: ICD-10-CM

## 2024-07-29 DIAGNOSIS — N18.6 END STAGE RENAL DISEASE: ICD-10-CM

## 2024-07-29 DIAGNOSIS — I10 ESSENTIAL (PRIMARY) HYPERTENSION: ICD-10-CM

## 2024-07-29 DIAGNOSIS — E11.29 TYPE 2 DIABETES MELLITUS WITH OTHER DIABETIC KIDNEY COMPLICATION: ICD-10-CM

## 2024-07-29 DIAGNOSIS — E11.40 TYPE 2 DIABETES MELLITUS WITH DIABETIC NEUROPATHY, UNSPECIFIED: ICD-10-CM

## 2024-07-29 DIAGNOSIS — Z99.2 END STAGE RENAL DISEASE: ICD-10-CM

## 2024-07-29 DIAGNOSIS — E78.5 HYPERLIPIDEMIA, UNSPECIFIED: ICD-10-CM

## 2024-07-29 PROCEDURE — 36415 COLL VENOUS BLD VENIPUNCTURE: CPT

## 2024-07-29 PROCEDURE — G2211 COMPLEX E/M VISIT ADD ON: CPT

## 2024-07-29 PROCEDURE — 99215 OFFICE O/P EST HI 40 MIN: CPT

## 2024-07-29 NOTE — ASSESSMENT
[FreeTextEntry1] : #ESRD on PD (as of 1/2024) pending transplant Follows with nephrology - Dr. Rani Paniagua  BP and diabetic control stressed to patient  Torsemide and carvedilol per nephrology  Officially on transplant list as of 1/4/24 - monthly rogosin requirement  Needs to f/u annually with transplant center - also notify of any changes in health or hospitalizations  #Type II DM uncontrolled  ophtho - UTD - early retinopathy 2/2023 - will schedule f/u visit  current regimen - januvia, insulin (titrated per endocrine)  (jardiance d/c and MFM d/c as patient on active PD)  #Wound care  nonhealing wounds on bilateral shins related to uncontrolled diabetes  wound care specialist following - HEALED  no wounds currently  [ ] podiatry exam  [ ] CT chest ordered for 4/2024 to f/u recent findings of pulmonary nodules recommended for 3 month f/u  ** encouraged to schedule

## 2024-07-29 NOTE — PHYSICAL EXAM
[Normal] : soft, non-tender, non-distended, no masses palpated, no HSM and normal bowel sounds [de-identified] : bilateral wounds on shins - wrapped [de-identified] : proprioception impaired, sensation diminished in bilateral feet

## 2024-07-29 NOTE — HISTORY OF PRESENT ILLNESS
[FreeTextEntry1] : Patient presents today for follow-up of chronic medical conditions.  [de-identified] : PMHx Type II DM, HLD, BABIN, HTN, ESRD (on PD)   Ophtho- Dr. Carty - retinopathy - receiving injections  Endocrine - Dr. Ruiz  Nephrology - Dr. Paniagua   7/2024 -  On transplant list - status 1 - told currently 8 year waiting period  Has struggled with blood sugar control since starting PD  The dialysate is a dextrose fluid and has been causing large spikes in evening - has been titrating insulin per endocrine accordingly  Similarly BP has been labile   Prior -  Transplant eval completed - patient has been officially listed on transplant list!  Started PD as of 1/2024 - going well overall - starts each evening at midnight Weight and swelling have fluctuated - but overall patient feels well with no acute complaints Saw Dr. Ruiz - A1C improved - semglee adjusted to 12U - going well thus far  Sees nephrology monthly  Completed ID clearance  Prior -  Reports that since starting torsemide has not noticed any change in urination - and feels his ankles remain swollen. Has 10-15 lb weight fluctuations - will need increase in diuretic.  Additionally patient compliant with insulin since last visit with interval decrease in A1C 10 > 8% Denies hypoglycemic episodes   Otherwise feels well

## 2024-07-29 NOTE — HEALTH RISK ASSESSMENT
[Good] : ~his/her~  mood as  good [Monthly or less (1 pt)] : Monthly or less (1 point) [1 or 2 (0 pts)] : 1 or 2 (0 points) [Never (0 pts)] : Never (0 points) [No] : In the past 12 months have you used drugs other than those required for medical reasons? No [No falls in past year] : Patient reported no falls in the past year [0] : 2) Feeling down, depressed, or hopeless: Not at all (0) [PHQ-2 Negative - No further assessment needed] : PHQ-2 Negative - No further assessment needed [Never] : Never [HIV Test offered] : HIV Test offered [Hepatitis C test offered] : Hepatitis C test offered [None] : None [Alone] : lives alone [Employed] : employed [Single] : single [Fully functional (bathing, dressing, toileting, transferring, walking, feeding)] : Fully functional (bathing, dressing, toileting, transferring, walking, feeding) [Fully functional (using the telephone, shopping, preparing meals, housekeeping, doing laundry, using] : Fully functional and needs no help or supervision to perform IADLs (using the telephone, shopping, preparing meals, housekeeping, doing laundry, using transportation, managing medications and managing finances) [Reports changes in vision] : Reports changes in vision [de-identified] : sedentary  [de-identified] : takeout - unhealthy  [[id number]] : 0 [Change in mental status noted] : No change in mental status noted [Language] : denies difficulty with language [Handling Complex Tasks] : denies difficulty handling complex tasks [Reports changes in hearing] : Reports no changes in hearing [Reports changes in dental health] : Reports no changes in dental health [Smoke Detector] : no smoke detector [Safety elements used in home] : no safety elements used in home [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

## 2024-07-29 NOTE — END OF VISIT
[Time Spent: ___ minutes] : I have spent [unfilled] minutes of time on the encounter.
[Time Spent: ___ minutes] : I have spent [unfilled] minutes of time on the encounter.
Patient

## 2024-07-29 NOTE — HEALTH RISK ASSESSMENT
[Good] : ~his/her~  mood as  good [Monthly or less (1 pt)] : Monthly or less (1 point) [1 or 2 (0 pts)] : 1 or 2 (0 points) [Never (0 pts)] : Never (0 points) [No] : In the past 12 months have you used drugs other than those required for medical reasons? No [No falls in past year] : Patient reported no falls in the past year [0] : 2) Feeling down, depressed, or hopeless: Not at all (0) [PHQ-2 Negative - No further assessment needed] : PHQ-2 Negative - No further assessment needed [Never] : Never [HIV Test offered] : HIV Test offered [Hepatitis C test offered] : Hepatitis C test offered [None] : None [Alone] : lives alone [Employed] : employed [Single] : single [Fully functional (bathing, dressing, toileting, transferring, walking, feeding)] : Fully functional (bathing, dressing, toileting, transferring, walking, feeding) [Fully functional (using the telephone, shopping, preparing meals, housekeeping, doing laundry, using] : Fully functional and needs no help or supervision to perform IADLs (using the telephone, shopping, preparing meals, housekeeping, doing laundry, using transportation, managing medications and managing finances) [Reports changes in vision] : Reports changes in vision [de-identified] : sedentary  [de-identified] : takeout - unhealthy  [[id number]] : 0 [Change in mental status noted] : No change in mental status noted [Language] : denies difficulty with language [Handling Complex Tasks] : denies difficulty handling complex tasks [Reports changes in hearing] : Reports no changes in hearing [Reports changes in dental health] : Reports no changes in dental health [Smoke Detector] : no smoke detector [Safety elements used in home] : no safety elements used in home [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

## 2024-07-29 NOTE — PHYSICAL EXAM
[Normal] : soft, non-tender, non-distended, no masses palpated, no HSM and normal bowel sounds [de-identified] : proprioception impaired, sensation diminished in bilateral feet  [de-identified] : bilateral wounds on shins - wrapped

## 2024-07-29 NOTE — HISTORY OF PRESENT ILLNESS
[FreeTextEntry1] : Patient presents today for follow-up of chronic medical conditions.  [de-identified] : PMHx Type II DM, HLD, BABIN, HTN, ESRD (on PD)   Ophtho- Dr. Carty - retinopathy - receiving injections  Endocrine - Dr. Ruiz  Nephrology - Dr. Paniagua   7/2024 -  On transplant list - status 1 - told currently 8 year waiting period  Has struggled with blood sugar control since starting PD  The dialysate is a dextrose fluid and has been causing large spikes in evening - has been titrating insulin per endocrine accordingly  Similarly BP has been labile   Prior -  Transplant eval completed - patient has been officially listed on transplant list!  Started PD as of 1/2024 - going well overall - starts each evening at midnight Weight and swelling have fluctuated - but overall patient feels well with no acute complaints Saw Dr. Ruiz - A1C improved - semglee adjusted to 12U - going well thus far  Sees nephrology monthly  Completed ID clearance  Prior -  Reports that since starting torsemide has not noticed any change in urination - and feels his ankles remain swollen. Has 10-15 lb weight fluctuations - will need increase in diuretic.  Additionally patient compliant with insulin since last visit with interval decrease in A1C 10 > 8% Denies hypoglycemic episodes   Otherwise feels well

## 2024-08-01 LAB
ALBUMIN SERPL ELPH-MCNC: 3.8 G/DL
ALP BLD-CCNC: 85 U/L
ALT SERPL-CCNC: 16 U/L
ANION GAP SERPL CALC-SCNC: 17 MMOL/L
AST SERPL-CCNC: 14 U/L
BASOPHILS # BLD AUTO: 0.09 K/UL
BASOPHILS NFR BLD AUTO: 1.1 %
BILIRUB SERPL-MCNC: 0.3 MG/DL
BUN SERPL-MCNC: 65 MG/DL
CALCIUM SERPL-MCNC: 8.2 MG/DL
CHLORIDE SERPL-SCNC: 94 MMOL/L
CHOLEST SERPL-MCNC: 115 MG/DL
CO2 SERPL-SCNC: 24 MMOL/L
CREAT SERPL-MCNC: 12.7 MG/DL
EGFR: 5 ML/MIN/1.73M2
EOSINOPHIL # BLD AUTO: 0.45 K/UL
EOSINOPHIL NFR BLD AUTO: 5.4 %
ESTIMATED AVERAGE GLUCOSE: 157 MG/DL
FERRITIN SERPL-MCNC: 414 NG/ML
FOLATE SERPL-MCNC: 13.2 NG/ML
GLUCOSE SERPL-MCNC: 145 MG/DL
HBA1C MFR BLD HPLC: 7.1 %
HCT VFR BLD CALC: 34.7 %
HDLC SERPL-MCNC: 29 MG/DL
HGB BLD-MCNC: 10.4 G/DL
IMM GRANULOCYTES NFR BLD AUTO: 0.6 %
IRON SATN MFR SERPL: 23 %
IRON SERPL-MCNC: 63 UG/DL
LDLC SERPL CALC-MCNC: 63 MG/DL
LYMPHOCYTES # BLD AUTO: 0.97 K/UL
LYMPHOCYTES NFR BLD AUTO: 11.7 %
MAN DIFF?: NORMAL
MCHC RBC-ENTMCNC: 23.4 PG
MCHC RBC-ENTMCNC: 30 GM/DL
MCV RBC AUTO: 78.2 FL
MONOCYTES # BLD AUTO: 0.59 K/UL
MONOCYTES NFR BLD AUTO: 7.1 %
NEUTROPHILS # BLD AUTO: 6.14 K/UL
NEUTROPHILS NFR BLD AUTO: 74.1 %
NONHDLC SERPL-MCNC: 86 MG/DL
PLATELET # BLD AUTO: 243 K/UL
POTASSIUM SERPL-SCNC: 5.5 MMOL/L
PROT SERPL-MCNC: 7.2 G/DL
RBC # BLD: 4.44 M/UL
RBC # FLD: 18.6 %
SODIUM SERPL-SCNC: 135 MMOL/L
TIBC SERPL-MCNC: 271 UG/DL
TRIGL SERPL-MCNC: 129 MG/DL
UIBC SERPL-MCNC: 208 UG/DL
VIT B12 SERPL-MCNC: 1032 PG/ML
WBC # FLD AUTO: 8.29 K/UL

## 2024-08-07 ENCOUNTER — TRANSCRIPTION ENCOUNTER (OUTPATIENT)
Age: 44
End: 2024-08-07

## 2024-08-11 ENCOUNTER — APPOINTMENT (OUTPATIENT)
Dept: NEPHROLOGY | Facility: CLINIC | Age: 44
End: 2024-08-11

## 2024-08-12 ENCOUNTER — APPOINTMENT (OUTPATIENT)
Dept: NEPHROLOGY | Facility: CLINIC | Age: 44
End: 2024-08-12

## 2024-09-16 ENCOUNTER — APPOINTMENT (OUTPATIENT)
Dept: NEPHROLOGY | Facility: CLINIC | Age: 44
End: 2024-09-16

## 2024-09-16 DIAGNOSIS — Z99.2 END STAGE RENAL DISEASE: ICD-10-CM

## 2024-09-16 DIAGNOSIS — N18.6 END STAGE RENAL DISEASE: ICD-10-CM

## 2024-09-16 DIAGNOSIS — N25.81 SECONDARY HYPERPARATHYROIDISM OF RENAL ORIGIN: ICD-10-CM

## 2024-09-16 RX ORDER — CALCITRIOL 0.25 UG/1
0.25 CAPSULE, LIQUID FILLED ORAL
Qty: 90 | Refills: 3 | Status: ACTIVE | COMMUNITY
Start: 2024-09-16 | End: 1900-01-01

## 2024-10-02 RX ORDER — INSULIN GLARGINE 100 [IU]/ML
100 INJECTION, SOLUTION SUBCUTANEOUS
Qty: 2 | Refills: 0 | Status: ACTIVE | COMMUNITY
Start: 2024-10-02 | End: 1900-01-01

## 2024-10-11 ENCOUNTER — APPOINTMENT (OUTPATIENT)
Dept: NEPHROLOGY | Facility: CLINIC | Age: 44
End: 2024-10-11

## 2024-10-21 NOTE — H&P ADULT. - SOURCE OF INFORMATION, PROFILE
Abdomen , soft, nontender, nondistended , no guarding or rigidity , no masses palpable , normal bowel sounds , Liver and Spleen , no hepatosplenomegaly
patient
Statement Selected

## 2024-10-28 ENCOUNTER — NON-APPOINTMENT (OUTPATIENT)
Age: 44
End: 2024-10-28

## 2024-10-28 ENCOUNTER — RESULT REVIEW (OUTPATIENT)
Age: 44
End: 2024-10-28

## 2024-10-28 ENCOUNTER — APPOINTMENT (OUTPATIENT)
Dept: VASCULAR SURGERY | Facility: CLINIC | Age: 44
End: 2024-10-28
Payer: COMMERCIAL

## 2024-10-28 ENCOUNTER — APPOINTMENT (OUTPATIENT)
Dept: ENDOVASCULAR SURGERY | Facility: CLINIC | Age: 44
End: 2024-10-28
Payer: COMMERCIAL

## 2024-10-28 ENCOUNTER — APPOINTMENT (OUTPATIENT)
Dept: OPHTHALMOLOGY | Facility: CLINIC | Age: 44
End: 2024-10-28
Payer: COMMERCIAL

## 2024-10-28 VITALS
HEIGHT: 74 IN | DIASTOLIC BLOOD PRESSURE: 116 MMHG | SYSTOLIC BLOOD PRESSURE: 174 MMHG | OXYGEN SATURATION: 95 % | BODY MASS INDEX: 32.85 KG/M2 | HEART RATE: 114 BPM | TEMPERATURE: 98.2 F | WEIGHT: 256 LBS | RESPIRATION RATE: 18 BRPM

## 2024-10-28 DIAGNOSIS — Z99.2 END STAGE RENAL DISEASE: ICD-10-CM

## 2024-10-28 DIAGNOSIS — N18.6 END STAGE RENAL DISEASE: ICD-10-CM

## 2024-10-28 PROCEDURE — 76937 US GUIDE VASCULAR ACCESS: CPT

## 2024-10-28 PROCEDURE — 36558 INSERT TUNNELED CV CATH: CPT | Mod: RT

## 2024-10-28 PROCEDURE — 99205 OFFICE O/P NEW HI 60 MIN: CPT

## 2024-10-28 PROCEDURE — 99204 OFFICE O/P NEW MOD 45 MIN: CPT | Mod: 25

## 2024-10-28 PROCEDURE — 77001 FLUOROGUIDE FOR VEIN DEVICE: CPT

## 2024-10-28 PROCEDURE — 92136 OPHTHALMIC BIOMETRY: CPT

## 2024-10-28 PROCEDURE — 93986 DUP-SCAN HEMO COMPL UNI STD: CPT

## 2024-10-29 ENCOUNTER — APPOINTMENT (OUTPATIENT)
Dept: NEPHROLOGY | Facility: CLINIC | Age: 44
End: 2024-10-29
Payer: COMMERCIAL

## 2024-10-29 DIAGNOSIS — Z01.818 ENCOUNTER FOR OTHER PREPROCEDURAL EXAMINATION: ICD-10-CM

## 2024-10-29 PROCEDURE — 99213 OFFICE O/P EST LOW 20 MIN: CPT

## 2024-10-29 PROCEDURE — 93000 ELECTROCARDIOGRAM COMPLETE: CPT

## 2024-10-30 ENCOUNTER — APPOINTMENT (OUTPATIENT)
Dept: INTERNAL MEDICINE | Facility: CLINIC | Age: 44
End: 2024-10-30

## 2024-11-01 ENCOUNTER — NON-APPOINTMENT (OUTPATIENT)
Age: 44
End: 2024-11-01

## 2024-11-01 ENCOUNTER — APPOINTMENT (OUTPATIENT)
Dept: OPHTHALMOLOGY | Facility: EYE CENTER | Age: 44
End: 2024-11-01
Payer: COMMERCIAL

## 2024-11-01 PROCEDURE — 66982 XCAPSL CTRC RMVL CPLX WO ECP: CPT | Mod: LT

## 2024-11-02 ENCOUNTER — NON-APPOINTMENT (OUTPATIENT)
Age: 44
End: 2024-11-02

## 2024-11-02 ENCOUNTER — APPOINTMENT (OUTPATIENT)
Dept: OPHTHALMOLOGY | Facility: CLINIC | Age: 44
End: 2024-11-02
Payer: COMMERCIAL

## 2024-11-02 PROCEDURE — 99024 POSTOP FOLLOW-UP VISIT: CPT

## 2024-11-05 ENCOUNTER — NON-APPOINTMENT (OUTPATIENT)
Age: 44
End: 2024-11-05

## 2024-11-05 ENCOUNTER — APPOINTMENT (OUTPATIENT)
Dept: OPHTHALMOLOGY | Facility: CLINIC | Age: 44
End: 2024-11-05
Payer: COMMERCIAL

## 2024-11-05 PROCEDURE — 99024 POSTOP FOLLOW-UP VISIT: CPT

## 2024-11-05 PROCEDURE — 92250 FUNDUS PHOTOGRAPHY W/I&R: CPT

## 2024-11-12 ENCOUNTER — APPOINTMENT (OUTPATIENT)
Dept: NEPHROLOGY | Facility: CLINIC | Age: 44
End: 2024-11-12

## 2024-11-15 ENCOUNTER — APPOINTMENT (OUTPATIENT)
Dept: OPHTHALMOLOGY | Facility: EYE CENTER | Age: 44
End: 2024-11-15
Payer: COMMERCIAL

## 2024-11-15 ENCOUNTER — NON-APPOINTMENT (OUTPATIENT)
Age: 44
End: 2024-11-15

## 2024-11-15 PROCEDURE — 66982 XCAPSL CTRC RMVL CPLX WO ECP: CPT | Mod: 79,RT

## 2024-11-16 ENCOUNTER — APPOINTMENT (OUTPATIENT)
Dept: OPHTHALMOLOGY | Facility: CLINIC | Age: 44
End: 2024-11-16
Payer: COMMERCIAL

## 2024-11-16 ENCOUNTER — NON-APPOINTMENT (OUTPATIENT)
Age: 44
End: 2024-11-16

## 2024-11-16 PROCEDURE — 99024 POSTOP FOLLOW-UP VISIT: CPT

## 2024-11-19 ENCOUNTER — APPOINTMENT (OUTPATIENT)
Dept: INTERNAL MEDICINE | Facility: CLINIC | Age: 44
End: 2024-11-19

## 2024-11-21 ENCOUNTER — APPOINTMENT (OUTPATIENT)
Dept: OPHTHALMOLOGY | Facility: CLINIC | Age: 44
End: 2024-11-21
Payer: COMMERCIAL

## 2024-11-21 ENCOUNTER — APPOINTMENT (OUTPATIENT)
Dept: ENDOCRINOLOGY | Facility: CLINIC | Age: 44
End: 2024-11-21

## 2024-11-21 ENCOUNTER — NON-APPOINTMENT (OUTPATIENT)
Age: 44
End: 2024-11-21

## 2024-11-21 PROCEDURE — 99024 POSTOP FOLLOW-UP VISIT: CPT

## 2024-11-27 ENCOUNTER — OUTPATIENT (OUTPATIENT)
Dept: OUTPATIENT SERVICES | Facility: HOSPITAL | Age: 44
LOS: 1 days | End: 2024-11-27
Payer: COMMERCIAL

## 2024-11-27 VITALS
SYSTOLIC BLOOD PRESSURE: 170 MMHG | TEMPERATURE: 98 F | RESPIRATION RATE: 14 BRPM | HEART RATE: 90 BPM | HEIGHT: 74 IN | DIASTOLIC BLOOD PRESSURE: 97 MMHG | OXYGEN SATURATION: 97 % | WEIGHT: 257.06 LBS

## 2024-11-27 DIAGNOSIS — N18.6 END STAGE RENAL DISEASE: ICD-10-CM

## 2024-11-27 DIAGNOSIS — Z98.890 OTHER SPECIFIED POSTPROCEDURAL STATES: Chronic | ICD-10-CM

## 2024-11-27 DIAGNOSIS — Z01.818 ENCOUNTER FOR OTHER PREPROCEDURAL EXAMINATION: ICD-10-CM

## 2024-11-27 DIAGNOSIS — Z99.2 DEPENDENCE ON RENAL DIALYSIS: Chronic | ICD-10-CM

## 2024-11-27 LAB
A1C WITH ESTIMATED AVERAGE GLUCOSE RESULT: 5.2 % — SIGNIFICANT CHANGE UP (ref 4–5.6)
ANION GAP SERPL CALC-SCNC: 19 MMOL/L — HIGH (ref 5–17)
BUN SERPL-MCNC: 72 MG/DL — HIGH (ref 7–23)
CALCIUM SERPL-MCNC: 8.8 MG/DL — SIGNIFICANT CHANGE UP (ref 8.4–10.5)
CHLORIDE SERPL-SCNC: 92 MMOL/L — LOW (ref 96–108)
CO2 SERPL-SCNC: 25 MMOL/L — SIGNIFICANT CHANGE UP (ref 22–31)
CREAT SERPL-MCNC: 12.92 MG/DL — HIGH (ref 0.5–1.3)
EGFR: 4 ML/MIN/1.73M2 — LOW
ESTIMATED AVERAGE GLUCOSE: 103 MG/DL — SIGNIFICANT CHANGE UP (ref 68–114)
GLUCOSE SERPL-MCNC: 151 MG/DL — HIGH (ref 70–99)
HCT VFR BLD CALC: 34.1 % — LOW (ref 39–50)
HGB BLD-MCNC: 10.7 G/DL — LOW (ref 13–17)
MCHC RBC-ENTMCNC: 25.2 PG — LOW (ref 27–34)
MCHC RBC-ENTMCNC: 31.4 G/DL — LOW (ref 32–36)
MCV RBC AUTO: 80.2 FL — SIGNIFICANT CHANGE UP (ref 80–100)
NRBC # BLD: 0 /100 WBCS — SIGNIFICANT CHANGE UP (ref 0–0)
PLATELET # BLD AUTO: 222 K/UL — SIGNIFICANT CHANGE UP (ref 150–400)
POTASSIUM SERPL-MCNC: 5.6 MMOL/L — HIGH (ref 3.5–5.3)
POTASSIUM SERPL-SCNC: 5.6 MMOL/L — HIGH (ref 3.5–5.3)
RBC # BLD: 4.25 M/UL — SIGNIFICANT CHANGE UP (ref 4.2–5.8)
RBC # FLD: 16.7 % — HIGH (ref 10.3–14.5)
SODIUM SERPL-SCNC: 136 MMOL/L — SIGNIFICANT CHANGE UP (ref 135–145)
WBC # BLD: 7.06 K/UL — SIGNIFICANT CHANGE UP (ref 3.8–10.5)
WBC # FLD AUTO: 7.06 K/UL — SIGNIFICANT CHANGE UP (ref 3.8–10.5)

## 2024-11-27 PROCEDURE — 83036 HEMOGLOBIN GLYCOSYLATED A1C: CPT

## 2024-11-27 PROCEDURE — G0463: CPT

## 2024-11-27 PROCEDURE — 80048 BASIC METABOLIC PNL TOTAL CA: CPT

## 2024-11-27 PROCEDURE — 85027 COMPLETE CBC AUTOMATED: CPT

## 2024-11-27 RX ORDER — SODIUM CHLORIDE 9 MG/ML
3 INJECTION, SOLUTION INTRAMUSCULAR; INTRAVENOUS; SUBCUTANEOUS EVERY 8 HOURS
Refills: 0 | Status: DISCONTINUED | OUTPATIENT
Start: 2024-12-18 | End: 2024-12-18

## 2024-11-27 RX ORDER — LIDOCAINE HCL 20 MG/ML
0.2 VIAL (ML) INJECTION ONCE
Refills: 0 | Status: DISCONTINUED | OUTPATIENT
Start: 2024-12-18 | End: 2024-12-18

## 2024-11-27 RX ORDER — CHLORHEXIDINE GLUCONATE 1.2 MG/ML
1 RINSE ORAL ONCE
Refills: 0 | Status: DISCONTINUED | OUTPATIENT
Start: 2024-12-18 | End: 2024-12-18

## 2024-11-27 NOTE — H&P PST ADULT - HISTORY OF PRESENT ILLNESS
44 year old right hand dominant male with PMH of T2D requiring insulin, HTN, HLD, DM neuropathy, DM Retinopathy, DM Nephropathy,  ESRD previously on Peritoneal Dialysis (started january 2024), states now on HD for the past month, HLD  (Monday/Wednesday/Friday schedule via right ACW permacath), transplant listed since january 2024, who presents to PST prior to scheduled Creation LEFT Radiocephalic AV Fistula, Removal of Peritoneal Dialysis Catheter on 12/18/2024.. Patient endorses he has severe visual problems that has made peritoneal dialysis difficult to continue. For these reasons he is being converted to hemodialysis. Patient denies fever, chills, nausea, vomiting, or abdominal pain.     **patient reports he is going to HD after PST visit today

## 2024-11-27 NOTE — H&P PST ADULT - PROBLEM SELECTOR PLAN 1
Creation LEFT Radiocephalic AV Fistula, Removal of Peritoneal Dialysis Catheter  -cbc. bmp, A1c @ PST  -preop instructions discussed  -instructed to reduce Semglee dose by 20% on 12/17 (take 16 units)  -instructed to hold Januvia DOS  -fingerstick and vbg for K on admit  -procedure day coincides with HD day, patient to contact nephrologist re: plan for HD preop. surgeon notified via email

## 2024-11-27 NOTE — H&P PST ADULT - NSICDXPASTMEDICALHX_GEN_ALL_CORE_FT
PAST MEDICAL HISTORY:  Diabetic neuropathy     ESRD on dialysis     History of diabetic retinopathy     HLD (hyperlipidemia)     HTN (hypertension)     Insulin dependent type 2 diabetes mellitus     Latent tuberculosis     Stage 5 chronic kidney disease not on chronic dialysis

## 2024-11-27 NOTE — H&P PST ADULT - NSICDXPASTSURGICALHX_GEN_ALL_CORE_FT
PAST SURGICAL HISTORY:  History of incision and drainage     Peritoneal dialysis catheter in situ

## 2024-12-04 ENCOUNTER — OUTPATIENT (OUTPATIENT)
Dept: OUTPATIENT SERVICES | Facility: HOSPITAL | Age: 44
LOS: 1 days | End: 2024-12-04
Payer: COMMERCIAL

## 2024-12-04 ENCOUNTER — APPOINTMENT (OUTPATIENT)
Dept: CT IMAGING | Facility: IMAGING CENTER | Age: 44
End: 2024-12-04
Payer: COMMERCIAL

## 2024-12-04 DIAGNOSIS — Z99.2 DEPENDENCE ON RENAL DIALYSIS: Chronic | ICD-10-CM

## 2024-12-04 DIAGNOSIS — A31.9 MYCOBACTERIAL INFECTION, UNSPECIFIED: ICD-10-CM

## 2024-12-04 PROBLEM — E11.40 TYPE 2 DIABETES MELLITUS WITH DIABETIC NEUROPATHY, UNSPECIFIED: Chronic | Status: ACTIVE | Noted: 2024-11-27

## 2024-12-04 PROBLEM — N18.6 END STAGE RENAL DISEASE: Chronic | Status: ACTIVE | Noted: 2024-11-27

## 2024-12-04 PROCEDURE — 71250 CT THORAX DX C-: CPT

## 2024-12-04 PROCEDURE — 71250 CT THORAX DX C-: CPT | Mod: 26

## 2024-12-06 DIAGNOSIS — E87.5 HYPERKALEMIA: ICD-10-CM

## 2024-12-07 ENCOUNTER — NON-APPOINTMENT (OUTPATIENT)
Age: 44
End: 2024-12-07

## 2024-12-07 LAB
ALBUMIN SERPL ELPH-MCNC: 4.3 G/DL
ALP BLD-CCNC: 63 U/L
ALT SERPL-CCNC: 17 U/L
ANION GAP SERPL CALC-SCNC: 19 MMOL/L
AST SERPL-CCNC: 30 U/L
BASOPHILS # BLD AUTO: 0.11 K/UL
BASOPHILS NFR BLD AUTO: 1.2 %
BILIRUB SERPL-MCNC: 0.4 MG/DL
BUN SERPL-MCNC: 62 MG/DL
CALCIUM SERPL-MCNC: 9.1 MG/DL
CHLORIDE SERPL-SCNC: 95 MMOL/L
CHOLEST SERPL-MCNC: 117 MG/DL
CO2 SERPL-SCNC: 23 MMOL/L
CREAT SERPL-MCNC: 13.17 MG/DL
EGFR: 4 ML/MIN/1.73M2
EOSINOPHIL # BLD AUTO: 0.83 K/UL
EOSINOPHIL NFR BLD AUTO: 9 %
ESTIMATED AVERAGE GLUCOSE: 100 MG/DL
GLUCOSE SERPL-MCNC: 107 MG/DL
HBA1C MFR BLD HPLC: 5.1 %
HCT VFR BLD CALC: 36 %
HDLC SERPL-MCNC: 31 MG/DL
HGB BLD-MCNC: 10.9 G/DL
IMM GRANULOCYTES NFR BLD AUTO: 0.3 %
LDLC SERPL CALC-MCNC: 62 MG/DL
LYMPHOCYTES # BLD AUTO: 1.26 K/UL
LYMPHOCYTES NFR BLD AUTO: 13.7 %
MAN DIFF?: NORMAL
MCHC RBC-ENTMCNC: 25.1 PG
MCHC RBC-ENTMCNC: 30.3 G/DL
MCV RBC AUTO: 82.9 FL
MONOCYTES # BLD AUTO: 0.79 K/UL
MONOCYTES NFR BLD AUTO: 8.6 %
NEUTROPHILS # BLD AUTO: 6.17 K/UL
NEUTROPHILS NFR BLD AUTO: 67.2 %
NONHDLC SERPL-MCNC: 87 MG/DL
PLATELET # BLD AUTO: 142 K/UL
POTASSIUM SERPL-SCNC: 7.6 MMOL/L
PROT SERPL-MCNC: 7 G/DL
RBC # BLD: 4.34 M/UL
RBC # FLD: 16.2 %
SODIUM SERPL-SCNC: 137 MMOL/L
TRIGL SERPL-MCNC: 142 MG/DL
TSH SERPL-ACNC: 3.11 UIU/ML
WBC # FLD AUTO: 9.19 K/UL

## 2024-12-09 RX ORDER — SODIUM ZIRCONIUM CYCLOSILICATE 10 G/10G
10 POWDER, FOR SUSPENSION ORAL DAILY
Qty: 90 | Refills: 3 | Status: ACTIVE | COMMUNITY
Start: 2024-12-06 | End: 1900-01-01

## 2024-12-10 ENCOUNTER — NON-APPOINTMENT (OUTPATIENT)
Age: 44
End: 2024-12-10

## 2024-12-10 ENCOUNTER — APPOINTMENT (OUTPATIENT)
Dept: INTERNAL MEDICINE | Facility: CLINIC | Age: 44
End: 2024-12-10

## 2024-12-10 ENCOUNTER — APPOINTMENT (OUTPATIENT)
Dept: OPHTHALMOLOGY | Facility: CLINIC | Age: 44
End: 2024-12-10
Payer: COMMERCIAL

## 2024-12-10 VITALS
WEIGHT: 258 LBS | HEIGHT: 74 IN | SYSTOLIC BLOOD PRESSURE: 191 MMHG | OXYGEN SATURATION: 97 % | HEART RATE: 86 BPM | BODY MASS INDEX: 33.11 KG/M2 | DIASTOLIC BLOOD PRESSURE: 109 MMHG

## 2024-12-10 DIAGNOSIS — E11.49 TYPE 2 DIABETES MELLITUS WITH DIABETIC NEUROPATHY, UNSPECIFIED: ICD-10-CM

## 2024-12-10 DIAGNOSIS — E11.29 TYPE 2 DIABETES MELLITUS WITH OTHER DIABETIC KIDNEY COMPLICATION: ICD-10-CM

## 2024-12-10 DIAGNOSIS — Z23 ENCOUNTER FOR IMMUNIZATION: ICD-10-CM

## 2024-12-10 DIAGNOSIS — N25.81 SECONDARY HYPERPARATHYROIDISM OF RENAL ORIGIN: ICD-10-CM

## 2024-12-10 DIAGNOSIS — E11.40 TYPE 2 DIABETES MELLITUS WITH DIABETIC NEUROPATHY, UNSPECIFIED: ICD-10-CM

## 2024-12-10 DIAGNOSIS — R80.9 TYPE 2 DIABETES MELLITUS WITH OTHER DIABETIC KIDNEY COMPLICATION: ICD-10-CM

## 2024-12-10 PROCEDURE — G0008: CPT

## 2024-12-10 PROCEDURE — 99024 POSTOP FOLLOW-UP VISIT: CPT

## 2024-12-10 PROCEDURE — 90656 IIV3 VACC NO PRSV 0.5 ML IM: CPT

## 2024-12-10 PROCEDURE — 99215 OFFICE O/P EST HI 40 MIN: CPT | Mod: 25

## 2024-12-11 ENCOUNTER — NON-APPOINTMENT (OUTPATIENT)
Age: 44
End: 2024-12-11

## 2024-12-11 ENCOUNTER — APPOINTMENT (OUTPATIENT)
Dept: OTOLARYNGOLOGY | Facility: CLINIC | Age: 44
End: 2024-12-11
Payer: COMMERCIAL

## 2024-12-11 VITALS — TEMPERATURE: 98 F | HEART RATE: 97 BPM | DIASTOLIC BLOOD PRESSURE: 107 MMHG | SYSTOLIC BLOOD PRESSURE: 181 MMHG

## 2024-12-11 DIAGNOSIS — H91.20 SUDDEN IDIOPATHIC HEARING LOSS, UNSPECIFIED EAR: ICD-10-CM

## 2024-12-11 PROBLEM — H91.22 SUDDEN LEFT HEARING LOSS: Status: ACTIVE | Noted: 2024-12-11

## 2024-12-11 PROBLEM — H90.3 ASYMMETRIC SNHL (SENSORINEURAL HEARING LOSS): Status: ACTIVE | Noted: 2024-12-11

## 2024-12-11 PROCEDURE — 92557 COMPREHENSIVE HEARING TEST: CPT

## 2024-12-11 PROCEDURE — 92567 TYMPANOMETRY: CPT

## 2024-12-11 PROCEDURE — 99203 OFFICE O/P NEW LOW 30 MIN: CPT

## 2024-12-13 ENCOUNTER — NON-APPOINTMENT (OUTPATIENT)
Age: 44
End: 2024-12-13

## 2024-12-13 ENCOUNTER — APPOINTMENT (OUTPATIENT)
Dept: NEPHROLOGY | Facility: CLINIC | Age: 44
End: 2024-12-13
Payer: COMMERCIAL

## 2024-12-13 ENCOUNTER — APPOINTMENT (OUTPATIENT)
Dept: NEPHROLOGY | Facility: CLINIC | Age: 44
End: 2024-12-13

## 2024-12-13 DIAGNOSIS — Z01.818 ENCOUNTER FOR OTHER PREPROCEDURAL EXAMINATION: ICD-10-CM

## 2024-12-13 LAB
ABO + RH PNL BLD: NORMAL
ALBUMIN SERPL ELPH-MCNC: 4.4 G/DL
ALP BLD-CCNC: 60 U/L
ALT SERPL-CCNC: 16 U/L
ANION GAP SERPL CALC-SCNC: 18 MMOL/L
AST SERPL-CCNC: 12 U/L
BILIRUB SERPL-MCNC: 0.4 MG/DL
BUN SERPL-MCNC: 94 MG/DL
C PEPTIDE SERPL-MCNC: 13.3 NG/ML
CALCIUM SERPL-MCNC: 9.4 MG/DL
CHLORIDE SERPL-SCNC: 94 MMOL/L
CHOLEST SERPL-MCNC: 108 MG/DL
CK SERPL-CCNC: 295 U/L
CO2 SERPL-SCNC: 25 MMOL/L
COVID-19 SPIKE DOMAIN ANTIBODY INTERPRETATION: POSITIVE
CREAT SERPL-MCNC: 13.37 MG/DL
CRP SERPL-MCNC: 11 MG/L
EGFR: 4 ML/MIN/1.73M2
ERYTHROCYTE [SEDIMENTATION RATE] IN BLOOD BY WESTERGREN METHOD: 44 MM/HR
GLUCOSE SERPL-MCNC: 131 MG/DL
HAV IGM SER QL: NONREACTIVE
HBV CORE IGG+IGM SER QL: NONREACTIVE
HBV SURFACE AB SER QL: REACTIVE
HBV SURFACE AB SERPL IA-ACNC: 242.2 MIU/ML
HBV SURFACE AG SER QL: NONREACTIVE
HCT VFR BLD CALC: 35.8 %
HCV AB SER QL: NONREACTIVE
HCV S/CO RATIO: 0.08 S/CO
HDLC SERPL-MCNC: 36 MG/DL
HEPATITIS A IGG ANTIBODY: NONREACTIVE
HGB BLD-MCNC: 10.9 G/DL
HIV1+2 AB SPEC QL IA.RAPID: NONREACTIVE
ISOAGGLUTININ TITER, ANTI-A, IGG: 4
ISOAGGLUTININ TITER, ANTI-A, IGM: 8
LDLC SERPL CALC-MCNC: 51 MG/DL
MAGNESIUM SERPL-MCNC: 2.4 MG/DL
MCHC RBC-ENTMCNC: 24.7 PG
MCHC RBC-ENTMCNC: 30.4 G/DL
MCV RBC AUTO: 81 FL
NONHDLC SERPL-MCNC: 72 MG/DL
PARATHYROID HORMONE INTACT: 262 PG/ML
PHOSPHATE SERPL-MCNC: 4.2 MG/DL
PLATELET # BLD AUTO: 282 K/UL
POTASSIUM SERPL-SCNC: 6.9 MMOL/L
PROT SERPL-MCNC: 7.1 G/DL
PSA SERPL-MCNC: 0.41 NG/ML
RBC # BLD: 4.42 M/UL
RBC # FLD: 15.5 %
SARS-COV-2 AB SERPL IA-ACNC: >250 U/ML
SODIUM SERPL-SCNC: 137 MMOL/L
T PALLIDUM AB SER QL IA: NEGATIVE
T3 SERPL-MCNC: 72 NG/DL
T4 FREE SERPL-MCNC: 1.1 NG/DL
TRIGL SERPL-MCNC: 113 MG/DL
TSH SERPL-ACNC: 1.76 UIU/ML
URATE SERPL-MCNC: 9.4 MG/DL
WBC # FLD AUTO: 13.07 K/UL

## 2024-12-13 PROCEDURE — 99205 OFFICE O/P NEW HI 60 MIN: CPT

## 2024-12-16 ENCOUNTER — APPOINTMENT (OUTPATIENT)
Dept: OTOLARYNGOLOGY | Facility: CLINIC | Age: 44
End: 2024-12-16
Payer: COMMERCIAL

## 2024-12-16 VITALS
BODY MASS INDEX: 32.73 KG/M2 | WEIGHT: 255 LBS | HEART RATE: 92 BPM | OXYGEN SATURATION: 99 % | SYSTOLIC BLOOD PRESSURE: 180 MMHG | HEIGHT: 74 IN | DIASTOLIC BLOOD PRESSURE: 109 MMHG

## 2024-12-16 DIAGNOSIS — E11.9 TYPE 2 DIABETES MELLITUS W/OUT COMPLICATIONS: ICD-10-CM

## 2024-12-16 DIAGNOSIS — Z99.2 END STAGE RENAL DISEASE: ICD-10-CM

## 2024-12-16 DIAGNOSIS — H90.3 SENSORINEURAL HEARING LOSS, BILATERAL: ICD-10-CM

## 2024-12-16 DIAGNOSIS — N18.6 END STAGE RENAL DISEASE: ICD-10-CM

## 2024-12-16 DIAGNOSIS — H91.22 SUDDEN IDIOPATHIC HEARING LOSS, LEFT EAR: ICD-10-CM

## 2024-12-16 PROCEDURE — 99213 OFFICE O/P EST LOW 20 MIN: CPT

## 2024-12-16 PROCEDURE — 92567 TYMPANOMETRY: CPT

## 2024-12-16 PROCEDURE — 92557 COMPREHENSIVE HEARING TEST: CPT

## 2024-12-17 LAB
CMV IGG SERPL QL: <0.2 U/ML
CMV IGG SERPL-IMP: NEGATIVE
EBV EA AB SER IA-ACNC: <5 U/ML
EBV EA AB TITR SER IF: POSITIVE
EBV EA IGG SER QL IA: 163 U/ML
EBV EA IGG SER-ACNC: NEGATIVE
EBV EA IGM SER IA-ACNC: NEGATIVE
EBV PATRN SPEC IB-IMP: NORMAL
EBV VCA IGG SER IA-ACNC: 121 U/ML
EBV VCA IGM SER QL IA: <10 U/ML
EPSTEIN-BARR VIRUS CAPSID ANTIGEN IGG: POSITIVE
ESTIMATED AVERAGE GLUCOSE: 105 MG/DL
HBA1C MFR BLD HPLC: 5.3 %
HSV 1+2 IGG SER IA-IMP: NEGATIVE
HSV 1+2 IGG SER IA-IMP: POSITIVE
HSV 1+2 IGG SER IA-IMP: POSITIVE
HSV1 IGG SER QL: 16 INDEX
HSV1 IGG SER QL: 16 INDEX
HSV2 IGG SER QL: 0.03 INDEX
MEV IGG FLD QL IA: >300 AU/ML
MEV IGG+IGM SER-IMP: POSITIVE
RUBV IGG FLD-ACNC: 1.6 INDEX
RUBV IGG SER-IMP: POSITIVE
SARS-COV-2 N GENE NPH QL NAA+PROBE: NOT DETECTED
STRONGYLOIDES AB SER IA-ACNC: NEGATIVE
T CRUZI AB SER-ACNC: 0.2 IV
T GONDII AB SER-IMP: NEGATIVE
T GONDII IGG SER QL: <3 IU/ML
VZV AB TITR SER: POSITIVE
VZV IGG SER IF-ACNC: 26.3 S/CO

## 2024-12-18 ENCOUNTER — OUTPATIENT (OUTPATIENT)
Dept: INPATIENT UNIT | Facility: HOSPITAL | Age: 44
LOS: 1 days | End: 2024-12-18
Payer: COMMERCIAL

## 2024-12-18 ENCOUNTER — APPOINTMENT (OUTPATIENT)
Dept: VASCULAR SURGERY | Facility: HOSPITAL | Age: 44
End: 2024-12-18
Payer: COMMERCIAL

## 2024-12-18 ENCOUNTER — TRANSCRIPTION ENCOUNTER (OUTPATIENT)
Age: 44
End: 2024-12-18

## 2024-12-18 VITALS
TEMPERATURE: 98 F | WEIGHT: 257.06 LBS | RESPIRATION RATE: 18 BRPM | OXYGEN SATURATION: 98 % | HEART RATE: 85 BPM | DIASTOLIC BLOOD PRESSURE: 94 MMHG | HEIGHT: 74 IN | SYSTOLIC BLOOD PRESSURE: 164 MMHG

## 2024-12-18 VITALS
DIASTOLIC BLOOD PRESSURE: 92 MMHG | OXYGEN SATURATION: 100 % | HEART RATE: 77 BPM | RESPIRATION RATE: 18 BRPM | SYSTOLIC BLOOD PRESSURE: 152 MMHG | TEMPERATURE: 97 F

## 2024-12-18 DIAGNOSIS — Z98.890 OTHER SPECIFIED POSTPROCEDURAL STATES: Chronic | ICD-10-CM

## 2024-12-18 DIAGNOSIS — N18.6 END STAGE RENAL DISEASE: ICD-10-CM

## 2024-12-18 DIAGNOSIS — Z99.2 DEPENDENCE ON RENAL DIALYSIS: Chronic | ICD-10-CM

## 2024-12-18 LAB
GAS PNL BLDV: SIGNIFICANT CHANGE UP
GLUCOSE BLDC GLUCOMTR-MCNC: 115 MG/DL — HIGH (ref 70–99)
GLUCOSE BLDC GLUCOMTR-MCNC: 139 MG/DL — HIGH (ref 70–99)
GLUCOSE BLDC GLUCOMTR-MCNC: 145 MG/DL — HIGH (ref 70–99)
M TB IFN-G BLD-IMP: NEGATIVE
QUANTIFERON TB PLUS MITOGEN MINUS NIL: 1.3 IU/ML
QUANTIFERON TB PLUS NIL: 0.09 IU/ML
QUANTIFERON TB PLUS TB1 MINUS NIL: 0.08 IU/ML
QUANTIFERON TB PLUS TB2 MINUS NIL: 0.24 IU/ML

## 2024-12-18 PROCEDURE — 82330 ASSAY OF CALCIUM: CPT

## 2024-12-18 PROCEDURE — 85018 HEMOGLOBIN: CPT

## 2024-12-18 PROCEDURE — 82803 BLOOD GASES ANY COMBINATION: CPT

## 2024-12-18 PROCEDURE — 82947 ASSAY GLUCOSE BLOOD QUANT: CPT

## 2024-12-18 PROCEDURE — 36818 AV FUSE UPPR ARM CEPHALIC: CPT | Mod: LT

## 2024-12-18 PROCEDURE — C1889: CPT

## 2024-12-18 PROCEDURE — 85014 HEMATOCRIT: CPT

## 2024-12-18 PROCEDURE — 84295 ASSAY OF SERUM SODIUM: CPT

## 2024-12-18 PROCEDURE — 82435 ASSAY OF BLOOD CHLORIDE: CPT

## 2024-12-18 PROCEDURE — 84132 ASSAY OF SERUM POTASSIUM: CPT

## 2024-12-18 PROCEDURE — 49422 REMOVE TUNNELED IP CATH: CPT

## 2024-12-18 PROCEDURE — 36820 AV FUSION/FOREARM VEIN: CPT | Mod: LT

## 2024-12-18 PROCEDURE — 83605 ASSAY OF LACTIC ACID: CPT

## 2024-12-18 PROCEDURE — 82962 GLUCOSE BLOOD TEST: CPT

## 2024-12-18 PROCEDURE — C9399: CPT

## 2024-12-18 DEVICE — CLIP APPLIER COVIDIEN SURGICLIP III 9" SM: Type: IMPLANTABLE DEVICE | Site: BILATERAL | Status: FUNCTIONAL

## 2024-12-18 DEVICE — SURGICEL 2 X 14": Type: IMPLANTABLE DEVICE | Site: BILATERAL | Status: FUNCTIONAL

## 2024-12-18 DEVICE — ARISTA 3GR: Type: IMPLANTABLE DEVICE | Site: BILATERAL | Status: FUNCTIONAL

## 2024-12-18 RX ORDER — ONDANSETRON HYDROCHLORIDE 4 MG/1
4 TABLET, FILM COATED ORAL ONCE
Refills: 0 | Status: DISCONTINUED | OUTPATIENT
Start: 2024-12-18 | End: 2024-12-19

## 2024-12-18 RX ORDER — OXYCODONE HYDROCHLORIDE 30 MG/1
1 TABLET ORAL
Qty: 6 | Refills: 0
Start: 2024-12-18

## 2024-12-18 RX ORDER — CEFAZOLIN SODIUM 10 G
2000 VIAL (EA) INJECTION ONCE
Refills: 0 | Status: COMPLETED | OUTPATIENT
Start: 2024-12-18 | End: 2024-12-18

## 2024-12-18 RX ORDER — HYDROMORPHONE HYDROCHLORIDE 2 MG/1
0.5 TABLET ORAL
Refills: 0 | Status: DISCONTINUED | OUTPATIENT
Start: 2024-12-18 | End: 2024-12-19

## 2024-12-18 RX ORDER — CALCIUM ACETATE 667 MG/5ML
4 SOLUTION ORAL
Refills: 0 | DISCHARGE

## 2024-12-18 RX ORDER — GLUCAGON INJECTION, SOLUTION 0.5 MG/.1ML
1 INJECTION, SOLUTION SUBCUTANEOUS ONCE
Refills: 0 | Status: DISCONTINUED | OUTPATIENT
Start: 2024-12-18 | End: 2025-01-01

## 2024-12-18 RX ORDER — TORSEMIDE 10 MG
1 TABLET ORAL
Refills: 0 | DISCHARGE

## 2024-12-18 RX ORDER — 0.9 % SODIUM CHLORIDE 0.9 %
1000 INTRAVENOUS SOLUTION INTRAVENOUS
Refills: 0 | Status: DISCONTINUED | OUTPATIENT
Start: 2024-12-18 | End: 2025-01-01

## 2024-12-18 RX ADMIN — HYDROMORPHONE HYDROCHLORIDE 0.5 MILLIGRAM(S): 2 TABLET ORAL at 17:13

## 2024-12-18 RX ADMIN — HYDROMORPHONE HYDROCHLORIDE 0.5 MILLIGRAM(S): 2 TABLET ORAL at 17:30

## 2024-12-18 NOTE — ASU DISCHARGE PLAN (ADULT/PEDIATRIC) - FINANCIAL ASSISTANCE
[facility] provides services at a reduced cost to those who are determined to be eligible through [facility]’s financial assistance program. Information regarding [facility]’s financial assistance program can be found by going to [email or web address] or by calling [phone].

## 2024-12-18 NOTE — PRE-ANESTHESIA EVALUATION ADULT - NSANTHAIRWAYFT_ENT_ALL_CORE
Physical Therapy  PT approached patient this AM for participation in therapy services. Patient reports having just returned from restroom and expresses desire to rest. Will follow up as schedule allows.      neck FROM, Mallampati evaluated

## 2024-12-18 NOTE — BRIEF OPERATIVE NOTE - TYPE OF ANESTHESIA
Go to [facility] now for your blood draw.  You will be contacted in 24 hours for results of your lab tests.    You preferred to defer CT scan of the abdomen/pelvis until after the above are done.    To schedule the CT scan, call [phone].    If pain is severe, you have fever, or other worrisome symptoms, you should be brought to the ER.          *Abdominal Pain, Unknown Cause (Female)    The exact cause of your abdominal (stomach) pain is not certain. This does not mean that this is something to worry about, or the right tests were not done. Everyone likes to know the exact cause of the problem, but sometimes with abdominal pain, there is no clear-cut cause, and this could be a good thing. The good news is that your symptoms can be treated, and you will feel better.   Your condition does not seem serious now; however, sometimes the signs of a serious problem may take more time to appear. For this reason, it is important for you to watch for any new symptoms, problems, or worsening of your condition.  Over the next few days, the abdominal pain may come and go, or be continuous. Other common symptoms can include nausea and vomiting. Sometimes it can be difficult to tell if you feel nauseous, you may just feel bad and not associate that feeling with nausea. Constipation, diarrhea, and a fever may go along with the pain.  The pain may continue even if treated correctly over the following days. Depending on how things go, sometimes the cause can become clear and may require further or different treatment. Additional evaluations, medications, or tests may be needed.  Home care  Your health care provider may prescribe medications for pain, symptoms, or an infection.  Follow the health care provider's instructions for taking these medications.  General care    Rest until your next exam. No strenuous activities.    Try to find positions that ease discomfort. A small pillow placed on the abdomen may help relieve  pain.    Something warm on your abdomen (such as a heating pad) may help, but be careful not to burn yourself.  Diet    Do not force yourself to eat, especially if having cramps, vomiting, or diarrhea.    Water is important so you do not get dehydrated. Soup may also be good. Sports drinks may also help, especially if they are not too acidic. Make sure you don't drink sugary drinks as this can make things worse. Take liquids in small amounts. Do not guzzle them.    Caffeine sometimes makes the pain and cramping worse.    Avoid dairy products if you have vomiting or diarrhea.    Don't eat large amounts at a time. Wait a few minutes between bites.    Eat a diet low in fiber (called a low-residue diet). Foods allowed include refined breads, white rice, fruit and vegetable juices without pulp, tender meats. These foods will pass more easily through the intestine.    Avoid fried or fatty foods, dairy, alcohol and spicy foods until your symptoms go away.  Follow-up care  Follow up with your health care provider as instructed, or if your pain does not begin to improve in the next 24 hours.  When to seek medical care  Seek prompt medical care if any of the following occur:    Pain gets worse or moves to the right lower abdomen    New or worsening vomiting or diarrhea    Swelling of the abdomen    Unable to pass stool for more than three days    New fever over 101  F (38.3 C), or rising fever    Blood in vomit or bowel movements (dark red or black color)    Jaundice (yellow color of eyes and skin)    Weakness, dizziness    Chest, arm, back, neck or jaw pain    Unexpected vaginal bleeding or missed period  Call 911  Call emergency services if any of the following occur:    Trouble breathing    Confusion    Fainting or loss of consciousness    Rapid heart rate    Seizure    [id number] Rose[facility]enzieCon[facility], [address] [place] [address], [place] [address]. All rights reserved. This information is not intended as a substitute for  professional medical care. Always follow your healthcare professional's instructions.         General

## 2024-12-18 NOTE — BRIEF OPERATIVE NOTE - NSICDXBRIEFPROCEDURE_GEN_ALL_CORE_FT
PROCEDURES:  Creation, AV fistula, radiocephalic 18-Dec-2024 16:21:03  Sheldon Ramos  Removal, catheter, dialysis, peritoneal 18-Dec-2024 16:21:23  Sheldon Ramos

## 2024-12-18 NOTE — ASU DISCHARGE PLAN (ADULT/PEDIATRIC) - CARE PROVIDER_API CALL
Arnoldo Hardin  Vascular Surgery  [address] [facility], [address]  [place] [address]  Phone: [phone]  Fax: [phone]  Follow Up Time: 2 weeks

## 2024-12-19 ENCOUNTER — APPOINTMENT (OUTPATIENT)
Dept: CT IMAGING | Facility: IMAGING CENTER | Age: 44
End: 2024-12-19

## 2024-12-19 ENCOUNTER — APPOINTMENT (OUTPATIENT)
Dept: MRI IMAGING | Facility: IMAGING CENTER | Age: 44
End: 2024-12-19

## 2024-12-19 ENCOUNTER — OUTPATIENT (OUTPATIENT)
Dept: OUTPATIENT SERVICES | Facility: HOSPITAL | Age: 44
LOS: 1 days | End: 2024-12-19
Payer: COMMERCIAL

## 2024-12-19 DIAGNOSIS — Z98.890 OTHER SPECIFIED POSTPROCEDURAL STATES: Chronic | ICD-10-CM

## 2024-12-19 DIAGNOSIS — Z01.818 ENCOUNTER FOR OTHER PREPROCEDURAL EXAMINATION: ICD-10-CM

## 2024-12-19 DIAGNOSIS — Z99.2 DEPENDENCE ON RENAL DIALYSIS: Chronic | ICD-10-CM

## 2024-12-19 PROCEDURE — 70551 MRI BRAIN STEM W/O DYE: CPT | Mod: 26

## 2024-12-19 PROCEDURE — 70551 MRI BRAIN STEM W/O DYE: CPT

## 2024-12-19 PROCEDURE — 74176 CT ABD & PELVIS W/O CONTRAST: CPT | Mod: 26

## 2024-12-19 PROCEDURE — 74176 CT ABD & PELVIS W/O CONTRAST: CPT

## 2025-01-02 ENCOUNTER — APPOINTMENT (OUTPATIENT)
Dept: VASCULAR SURGERY | Facility: CLINIC | Age: 45
End: 2025-01-02
Payer: COMMERCIAL

## 2025-01-02 ENCOUNTER — NON-APPOINTMENT (OUTPATIENT)
Age: 45
End: 2025-01-02

## 2025-01-02 DIAGNOSIS — Z98.890 OTHER SPECIFIED POSTPROCEDURAL STATES: ICD-10-CM

## 2025-01-02 PROCEDURE — 93990 DOPPLER FLOW TESTING: CPT

## 2025-01-02 PROCEDURE — 99024 POSTOP FOLLOW-UP VISIT: CPT

## 2025-01-03 NOTE — ED ADULT NURSE NOTE - SUICIDE SCREENING QUESTION 2
Paged by hospitalist tonight.  Lactic acidosis has increased and patient has become more uncomfortable.  Return to hospital to evaluate patient.  Patient has rebound in her left lower quadrant.  Otherwise she states she is comfortable.  Patient's daughter is at her bedside.    Secondary to increased lactic acidosis as well as increased pain proceed to operating for exploratory laparotomy possible bowel resection possible ostomy formation.  We discussed the risks and benefits of the procedure including but not limited to risk of bleeding, infection, organ injury, perforation, prolonged intubation, again all questions answered all issues addressed.  Patient and daughter wish to proceed with emergent surgery on 1/2/2025.   No

## 2025-01-09 ENCOUNTER — NON-APPOINTMENT (OUTPATIENT)
Age: 45
End: 2025-01-09

## 2025-01-09 ENCOUNTER — APPOINTMENT (OUTPATIENT)
Dept: OPHTHALMOLOGY | Facility: CLINIC | Age: 45
End: 2025-01-09
Payer: COMMERCIAL

## 2025-01-09 PROCEDURE — 99024 POSTOP FOLLOW-UP VISIT: CPT

## 2025-01-14 ENCOUNTER — APPOINTMENT (OUTPATIENT)
Dept: CARDIOLOGY | Facility: CLINIC | Age: 45
End: 2025-01-14

## 2025-01-16 ENCOUNTER — APPOINTMENT (OUTPATIENT)
Dept: OTOLARYNGOLOGY | Facility: CLINIC | Age: 45
End: 2025-01-16
Payer: COMMERCIAL

## 2025-01-16 VITALS
WEIGHT: 245 LBS | HEIGHT: 74 IN | HEART RATE: 85 BPM | BODY MASS INDEX: 31.44 KG/M2 | SYSTOLIC BLOOD PRESSURE: 152 MMHG | DIASTOLIC BLOOD PRESSURE: 89 MMHG | OXYGEN SATURATION: 97 %

## 2025-01-16 DIAGNOSIS — H91.22 SUDDEN IDIOPATHIC HEARING LOSS, LEFT EAR: ICD-10-CM

## 2025-01-16 PROCEDURE — 99213 OFFICE O/P EST LOW 20 MIN: CPT

## 2025-01-16 PROCEDURE — 92567 TYMPANOMETRY: CPT

## 2025-01-16 PROCEDURE — 92557 COMPREHENSIVE HEARING TEST: CPT

## 2025-01-16 RX ORDER — KETOROLAC TROMETHAMINE 5 MG/ML
0.5 SOLUTION OPHTHALMIC
Qty: 5 | Refills: 0 | Status: ACTIVE | COMMUNITY
Start: 2024-11-05

## 2025-01-16 RX ORDER — CIPROFLOXACIN 3 MG/ML
0.3 SOLUTION OPHTHALMIC
Qty: 5 | Refills: 0 | Status: ACTIVE | COMMUNITY
Start: 2024-11-05

## 2025-01-16 RX ORDER — PREDNISOLONE ACETATE 10 MG/ML
1 SUSPENSION/ DROPS OPHTHALMIC
Qty: 5 | Refills: 0 | Status: ACTIVE | COMMUNITY
Start: 2024-11-25

## 2025-01-16 RX ORDER — OXYCODONE 5 MG/1
5 TABLET ORAL
Qty: 6 | Refills: 0 | Status: ACTIVE | COMMUNITY
Start: 2024-12-18

## 2025-01-16 RX ORDER — CARVEDILOL 6.25 MG/1
6.25 TABLET, FILM COATED ORAL
Qty: 180 | Refills: 0 | Status: ACTIVE | COMMUNITY
Start: 2024-12-02

## 2025-01-21 ENCOUNTER — APPOINTMENT (OUTPATIENT)
Dept: OPHTHALMOLOGY | Facility: CLINIC | Age: 45
End: 2025-01-21

## 2025-01-31 ENCOUNTER — RESULT REVIEW (OUTPATIENT)
Age: 45
End: 2025-01-31

## 2025-01-31 ENCOUNTER — APPOINTMENT (OUTPATIENT)
Dept: ENDOVASCULAR SURGERY | Facility: CLINIC | Age: 45
End: 2025-01-31
Payer: COMMERCIAL

## 2025-01-31 VITALS
WEIGHT: 245 LBS | SYSTOLIC BLOOD PRESSURE: 90 MMHG | DIASTOLIC BLOOD PRESSURE: 58 MMHG | RESPIRATION RATE: 18 BRPM | BODY MASS INDEX: 31.44 KG/M2 | OXYGEN SATURATION: 97 % | TEMPERATURE: 98 F | HEART RATE: 86 BPM | HEIGHT: 74 IN

## 2025-01-31 PROCEDURE — 36902Z: CUSTOM | Mod: 58,59

## 2025-01-31 PROCEDURE — 36909Z: CUSTOM

## 2025-02-19 NOTE — ED ADULT NURSE NOTE - CHIEF COMPLAINT QUOTE
Where Is Your Acne Located?: Face
Additional Comments (Use Complete Sentences): Pt last seen in 2021 for acne. Accutane was discussed. Pt now presents to discuss Accutane further.
rales bibasilar and short of breathe pulse ox 93 non rebreather

## 2025-02-28 ENCOUNTER — APPOINTMENT (OUTPATIENT)
Dept: ENDOVASCULAR SURGERY | Facility: CLINIC | Age: 45
End: 2025-02-28

## 2025-02-28 DIAGNOSIS — Z99.2 END STAGE RENAL DISEASE: ICD-10-CM

## 2025-02-28 DIAGNOSIS — N18.6 END STAGE RENAL DISEASE: ICD-10-CM

## 2025-03-07 ENCOUNTER — RESULT REVIEW (OUTPATIENT)
Age: 45
End: 2025-03-07

## 2025-03-07 ENCOUNTER — APPOINTMENT (OUTPATIENT)
Dept: ENDOVASCULAR SURGERY | Facility: CLINIC | Age: 45
End: 2025-03-07

## 2025-03-07 VITALS
TEMPERATURE: 97.6 F | HEART RATE: 90 BPM | OXYGEN SATURATION: 98 % | BODY MASS INDEX: 31.44 KG/M2 | DIASTOLIC BLOOD PRESSURE: 80 MMHG | WEIGHT: 245 LBS | RESPIRATION RATE: 16 BRPM | SYSTOLIC BLOOD PRESSURE: 132 MMHG | HEIGHT: 74 IN

## 2025-03-07 PROCEDURE — 36909Z: CUSTOM

## 2025-03-07 PROCEDURE — 36215Z: CUSTOM | Mod: 58,59

## 2025-03-07 PROCEDURE — 36902Z: CUSTOM | Mod: 58,59

## 2025-03-13 ENCOUNTER — APPOINTMENT (OUTPATIENT)
Dept: OPHTHALMOLOGY | Facility: CLINIC | Age: 45
End: 2025-03-13
Payer: COMMERCIAL

## 2025-03-13 ENCOUNTER — NON-APPOINTMENT (OUTPATIENT)
Age: 45
End: 2025-03-13

## 2025-03-13 PROCEDURE — 92014 COMPRE OPH EXAM EST PT 1/>: CPT

## 2025-03-13 PROCEDURE — 92286 ANT SGM IMG I&R SPECLR MIC: CPT

## 2025-03-13 PROCEDURE — 92250 FUNDUS PHOTOGRAPHY W/I&R: CPT

## 2025-03-14 ENCOUNTER — APPOINTMENT (OUTPATIENT)
Dept: ENDOCRINOLOGY | Facility: CLINIC | Age: 45
End: 2025-03-14

## 2025-03-14 VITALS
OXYGEN SATURATION: 97 % | SYSTOLIC BLOOD PRESSURE: 113 MMHG | HEART RATE: 92 BPM | WEIGHT: 255 LBS | BODY MASS INDEX: 32.73 KG/M2 | HEIGHT: 74 IN | DIASTOLIC BLOOD PRESSURE: 74 MMHG

## 2025-03-14 DIAGNOSIS — E11.29 TYPE 2 DIABETES MELLITUS WITH OTHER DIABETIC KIDNEY COMPLICATION: ICD-10-CM

## 2025-03-14 DIAGNOSIS — I10 ESSENTIAL (PRIMARY) HYPERTENSION: ICD-10-CM

## 2025-03-14 DIAGNOSIS — R80.9 TYPE 2 DIABETES MELLITUS WITH OTHER DIABETIC KIDNEY COMPLICATION: ICD-10-CM

## 2025-03-14 DIAGNOSIS — E11.9 TYPE 2 DIABETES MELLITUS W/OUT COMPLICATIONS: ICD-10-CM

## 2025-03-14 DIAGNOSIS — E78.5 HYPERLIPIDEMIA, UNSPECIFIED: ICD-10-CM

## 2025-03-14 LAB
GLUCOSE BLDC GLUCOMTR-MCNC: 224
HBA1C MFR BLD HPLC: 9

## 2025-03-14 PROCEDURE — 83036 HEMOGLOBIN GLYCOSYLATED A1C: CPT | Mod: QW

## 2025-03-14 PROCEDURE — 82962 GLUCOSE BLOOD TEST: CPT

## 2025-03-14 PROCEDURE — 95251 CONT GLUC MNTR ANALYSIS I&R: CPT

## 2025-03-14 PROCEDURE — 99214 OFFICE O/P EST MOD 30 MIN: CPT

## 2025-03-14 RX ORDER — INSULIN LISPRO 100 [IU]/ML
100 INJECTION, SOLUTION INTRAVENOUS; SUBCUTANEOUS
Qty: 3 | Refills: 2 | Status: ACTIVE | COMMUNITY
Start: 2025-03-14 | End: 1900-01-01

## 2025-03-14 RX ORDER — INSULIN GLARGINE-YFGN 100 [IU]/ML
100 INJECTION, SOLUTION SUBCUTANEOUS
Qty: 3 | Refills: 1 | Status: ACTIVE | COMMUNITY
Start: 2025-03-13 | End: 1900-01-01

## 2025-03-19 RX ORDER — LIDOCAINE AND PRILOCAINE 25; 25 MG/G; MG/G
2.5-2.5 CREAM TOPICAL
Qty: 6 | Refills: 3 | Status: ACTIVE | COMMUNITY
Start: 2025-03-19 | End: 1900-01-01

## 2025-03-24 RX ORDER — AMLODIPINE BESYLATE 10 MG/1
10 TABLET ORAL DAILY
Qty: 90 | Refills: 3 | Status: ACTIVE | COMMUNITY
Start: 2025-03-19 | End: 1900-01-01

## 2025-04-10 ENCOUNTER — APPOINTMENT (OUTPATIENT)
Dept: INTERNAL MEDICINE | Facility: CLINIC | Age: 45
End: 2025-04-10

## 2025-04-10 ENCOUNTER — NON-APPOINTMENT (OUTPATIENT)
Age: 45
End: 2025-04-10

## 2025-04-10 VITALS — BODY MASS INDEX: 32.98 KG/M2 | WEIGHT: 257 LBS | OXYGEN SATURATION: 98 % | HEART RATE: 99 BPM | HEIGHT: 74 IN

## 2025-04-10 VITALS — SYSTOLIC BLOOD PRESSURE: 149 MMHG | DIASTOLIC BLOOD PRESSURE: 75 MMHG

## 2025-04-10 DIAGNOSIS — N18.9 CHRONIC KIDNEY DISEASE, UNSPECIFIED: ICD-10-CM

## 2025-04-10 DIAGNOSIS — Z99.2 END STAGE RENAL DISEASE: ICD-10-CM

## 2025-04-10 DIAGNOSIS — E11.40 TYPE 2 DIABETES MELLITUS WITH DIABETIC NEUROPATHY, UNSPECIFIED: ICD-10-CM

## 2025-04-10 DIAGNOSIS — D63.1 CHRONIC KIDNEY DISEASE, UNSPECIFIED: ICD-10-CM

## 2025-04-10 DIAGNOSIS — N18.6 END STAGE RENAL DISEASE: ICD-10-CM

## 2025-04-10 DIAGNOSIS — N25.81 SECONDARY HYPERPARATHYROIDISM OF RENAL ORIGIN: ICD-10-CM

## 2025-04-10 DIAGNOSIS — E78.5 HYPERLIPIDEMIA, UNSPECIFIED: ICD-10-CM

## 2025-04-10 DIAGNOSIS — E11.49 TYPE 2 DIABETES MELLITUS WITH DIABETIC NEUROPATHY, UNSPECIFIED: ICD-10-CM

## 2025-04-10 LAB
25(OH)D3 SERPL-MCNC: 19.8 NG/ML
ALBUMIN SERPL ELPH-MCNC: 4.5 G/DL
ALP BLD-CCNC: 73 U/L
ALT SERPL-CCNC: 17 U/L
ANION GAP SERPL CALC-SCNC: 17 MMOL/L
AST SERPL-CCNC: 16 U/L
BASOPHILS # BLD AUTO: 0.09 K/UL
BASOPHILS NFR BLD AUTO: 1.3 %
BILIRUB SERPL-MCNC: 0.4 MG/DL
BUN SERPL-MCNC: 55 MG/DL
CALCIUM SERPL-MCNC: 9.8 MG/DL
CHLORIDE SERPL-SCNC: 91 MMOL/L
CHOLEST SERPL-MCNC: 161 MG/DL
CO2 SERPL-SCNC: 28 MMOL/L
CREAT SERPL-MCNC: 10.84 MG/DL
EGFRCR SERPLBLD CKD-EPI 2021: 5 ML/MIN/1.73M2
EOSINOPHIL # BLD AUTO: 0.47 K/UL
EOSINOPHIL NFR BLD AUTO: 6.9 %
ESTIMATED AVERAGE GLUCOSE: 194 MG/DL
FOLATE SERPL-MCNC: 7.5 NG/ML
GLUCOSE SERPL-MCNC: 182 MG/DL
HBA1C MFR BLD HPLC: 8.4 %
HCT VFR BLD CALC: 36.6 %
HDLC SERPL-MCNC: 32 MG/DL
HGB BLD-MCNC: 11.2 G/DL
IMM GRANULOCYTES NFR BLD AUTO: 0.1 %
IRON SATN MFR SERPL: 40 %
IRON SERPL-MCNC: 117 UG/DL
LDLC SERPL-MCNC: 76 MG/DL
LYMPHOCYTES # BLD AUTO: 1.19 K/UL
LYMPHOCYTES NFR BLD AUTO: 17.5 %
MAN DIFF?: NORMAL
MCHC RBC-ENTMCNC: 24.2 PG
MCHC RBC-ENTMCNC: 30.6 G/DL
MCV RBC AUTO: 79.2 FL
MONOCYTES # BLD AUTO: 0.74 K/UL
MONOCYTES NFR BLD AUTO: 10.9 %
NEUTROPHILS # BLD AUTO: 4.31 K/UL
NEUTROPHILS NFR BLD AUTO: 63.3 %
NONHDLC SERPL-MCNC: 129 MG/DL
PLATELET # BLD AUTO: 209 K/UL
POTASSIUM SERPL-SCNC: 5.5 MMOL/L
PROT SERPL-MCNC: 7.6 G/DL
PSA SERPL-MCNC: 0.61 NG/ML
RBC # BLD: 4.62 M/UL
RBC # FLD: 16.6 %
SODIUM SERPL-SCNC: 136 MMOL/L
TIBC SERPL-MCNC: 291 UG/DL
TRIGL SERPL-MCNC: 323 MG/DL
TSH SERPL-ACNC: 1.89 UIU/ML
UIBC SERPL-MCNC: 173 UG/DL
VIT B12 SERPL-MCNC: 884 PG/ML
WBC # FLD AUTO: 6.81 K/UL

## 2025-04-10 PROCEDURE — 99396 PREV VISIT EST AGE 40-64: CPT

## 2025-04-10 PROCEDURE — 99214 OFFICE O/P EST MOD 30 MIN: CPT | Mod: 25

## 2025-04-10 PROCEDURE — G0444 DEPRESSION SCREEN ANNUAL: CPT | Mod: 59

## 2025-04-10 PROCEDURE — 93000 ELECTROCARDIOGRAM COMPLETE: CPT | Mod: 59

## 2025-04-15 ENCOUNTER — APPOINTMENT (OUTPATIENT)
Dept: OPHTHALMOLOGY | Facility: CLINIC | Age: 45
End: 2025-04-15
Payer: COMMERCIAL

## 2025-04-15 ENCOUNTER — NON-APPOINTMENT (OUTPATIENT)
Age: 45
End: 2025-04-15

## 2025-04-15 PROCEDURE — 92134 CPTRZ OPH DX IMG PST SGM RTA: CPT

## 2025-04-15 PROCEDURE — 66821 AFTER CATARACT LASER SURGERY: CPT | Mod: RT

## 2025-04-18 ENCOUNTER — APPOINTMENT (OUTPATIENT)
Dept: ENDOCRINOLOGY | Facility: CLINIC | Age: 45
End: 2025-04-18

## 2025-04-18 DIAGNOSIS — R80.9 TYPE 2 DIABETES MELLITUS WITH OTHER DIABETIC KIDNEY COMPLICATION: ICD-10-CM

## 2025-04-18 DIAGNOSIS — E11.29 TYPE 2 DIABETES MELLITUS WITH OTHER DIABETIC KIDNEY COMPLICATION: ICD-10-CM

## 2025-04-18 PROCEDURE — G0108 DIAB MANAGE TRN  PER INDIV: CPT

## 2025-04-18 PROCEDURE — 95251 CONT GLUC MNTR ANALYSIS I&R: CPT

## 2025-04-18 RX ORDER — BLOOD-GLUCOSE,RECEIVER,CONT
EACH MISCELLANEOUS
Qty: 1 | Refills: 0 | Status: ACTIVE | COMMUNITY
Start: 2025-04-18 | End: 1900-01-01

## 2025-04-18 RX ORDER — BLOOD-GLUCOSE SENSOR
EACH MISCELLANEOUS
Qty: 9 | Refills: 3 | Status: ACTIVE | COMMUNITY
Start: 2025-04-18 | End: 1900-01-01

## 2025-04-29 ENCOUNTER — APPOINTMENT (OUTPATIENT)
Dept: ENDOVASCULAR SURGERY | Facility: CLINIC | Age: 45
End: 2025-04-29
Payer: COMMERCIAL

## 2025-04-29 PROCEDURE — 36589 REMOVAL TUNNELED CV CATH: CPT

## 2025-05-06 ENCOUNTER — APPOINTMENT (OUTPATIENT)
Dept: OPHTHALMOLOGY | Facility: CLINIC | Age: 45
End: 2025-05-06
Payer: COMMERCIAL

## 2025-05-06 ENCOUNTER — NON-APPOINTMENT (OUTPATIENT)
Age: 45
End: 2025-05-06

## 2025-05-06 PROCEDURE — 66821 AFTER CATARACT LASER SURGERY: CPT | Mod: 79,LT

## 2025-06-04 ENCOUNTER — RESULT REVIEW (OUTPATIENT)
Age: 45
End: 2025-06-04

## 2025-06-04 ENCOUNTER — APPOINTMENT (OUTPATIENT)
Dept: ENDOVASCULAR SURGERY | Facility: CLINIC | Age: 45
End: 2025-06-04
Payer: COMMERCIAL

## 2025-06-04 VITALS
DIASTOLIC BLOOD PRESSURE: 76 MMHG | BODY MASS INDEX: 32.98 KG/M2 | SYSTOLIC BLOOD PRESSURE: 147 MMHG | TEMPERATURE: 97.9 F | HEART RATE: 75 BPM | OXYGEN SATURATION: 99 % | RESPIRATION RATE: 18 BRPM | WEIGHT: 257 LBS | HEIGHT: 74 IN

## 2025-06-04 PROCEDURE — 76937 US GUIDE VASCULAR ACCESS: CPT

## 2025-06-04 PROCEDURE — 36011Z: CUSTOM | Mod: 59,LT

## 2025-06-04 PROCEDURE — 36909Z: CUSTOM

## 2025-06-04 PROCEDURE — 36902Z: CUSTOM | Mod: 59

## 2025-06-04 PROCEDURE — 36215Z: CUSTOM | Mod: 59

## 2025-07-22 ENCOUNTER — APPOINTMENT (OUTPATIENT)
Dept: ENDOCRINOLOGY | Facility: CLINIC | Age: 45
End: 2025-07-22
Payer: COMMERCIAL

## 2025-07-22 VITALS
BODY MASS INDEX: 33.75 KG/M2 | OXYGEN SATURATION: 98 % | DIASTOLIC BLOOD PRESSURE: 85 MMHG | WEIGHT: 263 LBS | HEIGHT: 74 IN | SYSTOLIC BLOOD PRESSURE: 150 MMHG | HEART RATE: 107 BPM

## 2025-07-22 DIAGNOSIS — E78.5 HYPERLIPIDEMIA, UNSPECIFIED: ICD-10-CM

## 2025-07-22 DIAGNOSIS — I10 ESSENTIAL (PRIMARY) HYPERTENSION: ICD-10-CM

## 2025-07-22 PROCEDURE — 83036 HEMOGLOBIN GLYCOSYLATED A1C: CPT | Mod: QW

## 2025-07-22 PROCEDURE — 95251 CONT GLUC MNTR ANALYSIS I&R: CPT

## 2025-07-22 PROCEDURE — 99214 OFFICE O/P EST MOD 30 MIN: CPT

## 2025-07-23 LAB — HBA1C MFR BLD HPLC: 6.4

## 2025-08-07 ENCOUNTER — APPOINTMENT (OUTPATIENT)
Dept: INTERNAL MEDICINE | Facility: CLINIC | Age: 45
End: 2025-08-07
Payer: COMMERCIAL

## 2025-08-07 VITALS
HEIGHT: 74 IN | BODY MASS INDEX: 32.08 KG/M2 | SYSTOLIC BLOOD PRESSURE: 120 MMHG | HEART RATE: 108 BPM | DIASTOLIC BLOOD PRESSURE: 77 MMHG | WEIGHT: 250 LBS | OXYGEN SATURATION: 97 %

## 2025-08-07 DIAGNOSIS — R80.9 TYPE 2 DIABETES MELLITUS WITH OTHER DIABETIC KIDNEY COMPLICATION: ICD-10-CM

## 2025-08-07 DIAGNOSIS — E11.49 TYPE 2 DIABETES MELLITUS WITH DIABETIC NEUROPATHY, UNSPECIFIED: ICD-10-CM

## 2025-08-07 DIAGNOSIS — N25.81 SECONDARY HYPERPARATHYROIDISM OF RENAL ORIGIN: ICD-10-CM

## 2025-08-07 DIAGNOSIS — E11.40 TYPE 2 DIABETES MELLITUS WITH DIABETIC NEUROPATHY, UNSPECIFIED: ICD-10-CM

## 2025-08-07 DIAGNOSIS — E11.29 TYPE 2 DIABETES MELLITUS WITH OTHER DIABETIC KIDNEY COMPLICATION: ICD-10-CM

## 2025-08-07 DIAGNOSIS — Z98.890 OTHER SPECIFIED POSTPROCEDURAL STATES: ICD-10-CM

## 2025-08-07 PROCEDURE — G2211 COMPLEX E/M VISIT ADD ON: CPT

## 2025-08-07 PROCEDURE — 99215 OFFICE O/P EST HI 40 MIN: CPT

## 2025-09-02 ENCOUNTER — APPOINTMENT (OUTPATIENT)
Dept: OPHTHALMOLOGY | Facility: CLINIC | Age: 45
End: 2025-09-02
Payer: COMMERCIAL

## 2025-09-02 ENCOUNTER — NON-APPOINTMENT (OUTPATIENT)
Age: 45
End: 2025-09-02

## 2025-09-02 PROCEDURE — 92014 COMPRE OPH EXAM EST PT 1/>: CPT

## 2025-09-04 ENCOUNTER — APPOINTMENT (OUTPATIENT)
Dept: VASCULAR SURGERY | Facility: CLINIC | Age: 45
End: 2025-09-04

## 2025-09-04 ENCOUNTER — APPOINTMENT (OUTPATIENT)
Dept: VASCULAR SURGERY | Facility: CLINIC | Age: 45
End: 2025-09-04
Payer: COMMERCIAL

## 2025-09-04 PROCEDURE — 93922 UPR/L XTREMITY ART 2 LEVELS: CPT

## 2025-09-04 PROCEDURE — 99214 OFFICE O/P EST MOD 30 MIN: CPT

## 2025-09-04 PROCEDURE — 93990 DOPPLER FLOW TESTING: CPT | Mod: 59

## 2025-09-17 PROBLEM — T82.898A INADEQUATE FLOW OF DIALYSIS ARTERIOVENOUS FISTULA: Status: ACTIVE | Noted: 2025-09-17

## 2025-09-18 ENCOUNTER — APPOINTMENT (OUTPATIENT)
Dept: ENDOVASCULAR SURGERY | Facility: CLINIC | Age: 45
End: 2025-09-18
Payer: COMMERCIAL

## 2025-09-18 ENCOUNTER — RESULT REVIEW (OUTPATIENT)
Age: 45
End: 2025-09-18

## 2025-09-18 ENCOUNTER — APPOINTMENT (OUTPATIENT)
Dept: VASCULAR SURGERY | Facility: CLINIC | Age: 45
End: 2025-09-18
Payer: COMMERCIAL

## 2025-09-18 VITALS
HEART RATE: 87 BPM | SYSTOLIC BLOOD PRESSURE: 177 MMHG | DIASTOLIC BLOOD PRESSURE: 109 MMHG | HEIGHT: 74 IN | TEMPERATURE: 98 F | BODY MASS INDEX: 32.08 KG/M2 | WEIGHT: 250 LBS | RESPIRATION RATE: 16 BRPM | OXYGEN SATURATION: 97 %

## 2025-09-18 DIAGNOSIS — T82.898A OTHER SPECIFIED COMPLICATION OF VASCULAR PROSTHETIC DEVICES, IMPLANTS AND GRAFTS, INITIAL ENCOUNTER: ICD-10-CM

## 2025-09-18 DIAGNOSIS — N18.6 END STAGE RENAL DISEASE: ICD-10-CM

## 2025-09-18 DIAGNOSIS — Z99.2 END STAGE RENAL DISEASE: ICD-10-CM

## 2025-09-18 PROCEDURE — 37242Z: CUSTOM

## (undated) DEVICE — MINICAP EXTENDED LIFE TRANSFER SET WITH TWIST CLAMP 10CM

## (undated) DEVICE — DRSG STERISTRIPS 0.5 X 4"

## (undated) DEVICE — POSITIONER FOAM EGG CRATE ULNAR 2PCS (PINK)

## (undated) DEVICE — MEDICATION LABELS W MARKER

## (undated) DEVICE — PACK AV FISTULA

## (undated) DEVICE — NDL COUNTER FOAM AND MAGNET 40-70

## (undated) DEVICE — FOLEY HOLDER STATLOCK 2 WAY ADULT

## (undated) DEVICE — SPECIMEN CONTAINER 100ML

## (undated) DEVICE — CLAMP BULLDOG MIDI STRAIGHT (YELLOW) DISP

## (undated) DEVICE — DRAPE LIGHT HANDLE COVER (BLUE)

## (undated) DEVICE — BLADE SCALPEL SAFETYLOCK #10

## (undated) DEVICE — TUBING SUCTION 20FT

## (undated) DEVICE — SUT SOFSILK 4-0 18" TIES

## (undated) DEVICE — LAP PAD 18 X 18"

## (undated) DEVICE — SOL IRR POUR NS 0.9% 500ML

## (undated) DEVICE — SUCTION YANKAUER NO CONTROL VENT

## (undated) DEVICE — BLADE SCALPEL SAFETYLOCK #15

## (undated) DEVICE — TROCAR COVIDIEN VERSAONE FIXATION CANNULA 5MM

## (undated) DEVICE — VESSEL LOOP MINI-BLUE 0.075" X 16"

## (undated) DEVICE — GLV 8 PROTEXIS (WHITE)

## (undated) DEVICE — SUT BIOSYN 4-0 18" P-12

## (undated) DEVICE — CLAMP BULLDOG MINI STRAIGHT (GREEN) DISP

## (undated) DEVICE — SUT POLYSORB 3-0 30" V-20 UNDYED

## (undated) DEVICE — SYR ASEPTO

## (undated) DEVICE — GOWN TRIMAX LG

## (undated) DEVICE — INSUFFLATION NDL COVIDIEN VERSASTEP 14G SHORT

## (undated) DEVICE — TROCAR APPLIED MEDICAL KII BALLOON BLUNT TIP 12MM X 100MM

## (undated) DEVICE — GLV 8.5 PROTEXIS (WHITE)

## (undated) DEVICE — STAPLER SKIN VISI-STAT 35 WIDE

## (undated) DEVICE — DRSG COBAN 4"

## (undated) DEVICE — ELCTR BOVIE PENCIL SMOKE EVACUATION

## (undated) DEVICE — SOL IRR POUR H2O 250ML

## (undated) DEVICE — DRSG TEGADERM 6"X8"

## (undated) DEVICE — VENODYNE/SCD SLEEVE CALF MEDIUM

## (undated) DEVICE — CONN CATH TITANIUM

## (undated) DEVICE — GLV 7.5 PROTEXIS (WHITE)

## (undated) DEVICE — TROCAR MERIT MEDICAL METAL FALLER

## (undated) DEVICE — DRSG MASTISOL

## (undated) DEVICE — DRAPE 3/4 SHEET W REINFORCEMENT 56X77"

## (undated) DEVICE — PREP CHLORAPREP HI-LITE ORANGE 26ML

## (undated) DEVICE — SUT SOFSILK 2 60" TIES

## (undated) DEVICE — SOL INJ NS 0.9% 500ML 1-PORT

## (undated) DEVICE — GLV 7 PROTEXIS (WHITE)

## (undated) DEVICE — FOLEY TRAY 16FR 5CC LTX UMETER CLOSED

## (undated) DEVICE — BLADE SCALPEL SAFETYLOCK #11

## (undated) DEVICE — SUT PROLENE 6-0 4-30" BV-1

## (undated) DEVICE — TROCAR COVIDIEN VERSASTEP 5MM SHORT

## (undated) DEVICE — SEAL BIOPSY PORT ADJUSTABLE UP TO 9F

## (undated) DEVICE — PACK VP SHUNT

## (undated) DEVICE — GOWN XL

## (undated) DEVICE — WARMING BLANKET LOWER ADULT

## (undated) DEVICE — DRAPE LAPAROTOMY W POUCHES

## (undated) DEVICE — DRSG OPSITE 13.75 X 4"

## (undated) DEVICE — DRAPE INSTRUMENT POUCH 6.75" X 11"

## (undated) DEVICE — GLV 6.5 PROTEXIS (WHITE)

## (undated) DEVICE — VENODYNE/SCD SLEEVE CALF LARGE

## (undated) DEVICE — WARMING BLANKET UPPER ADULT

## (undated) DEVICE — VISITEC 4X4

## (undated) DEVICE — TUNNELER VASCULAR ACCESS CATH O/E 33CM

## (undated) DEVICE — DRAPE 1/2 SHEET 40X57"

## (undated) DEVICE — MARKING PEN W RULER

## (undated) DEVICE — CATH NG SALEM SUMP 16FR

## (undated) DEVICE — DRSG TEGADERM 6 X 8"

## (undated) DEVICE — VENODYNE/SCD SLEEVE FOOT

## (undated) DEVICE — DRSG COBAN 6"

## (undated) DEVICE — SUT SILK 3-0 18" TIES

## (undated) DEVICE — CAP DISCONNECT PD MINI

## (undated) DEVICE — CLAMP BULLDOG MIDI 45 DEGREE (GREEN) DISP

## (undated) DEVICE — DRAPE MAGNETIC INSTRUMENT MEDIUM

## (undated) DEVICE — VESSEL LOOP MAXI-BLUE 0.120" X 16"

## (undated) DEVICE — DRAPE MAYO STAND 30"

## (undated) DEVICE — SUT SOFSILK 2-0 18" TIES

## (undated) DEVICE — SUT BIOSYN 4-0 27" P-12

## (undated) DEVICE — D HELP - CLEARVIEW CLEARIFY SYSTEM

## (undated) DEVICE — SUT SOFSILK 2-0 30" TIES

## (undated) DEVICE — DRAPE TOWEL BLUE 17" X 24"